# Patient Record
Sex: MALE | Race: WHITE | NOT HISPANIC OR LATINO | Employment: OTHER | URBAN - NONMETROPOLITAN AREA
[De-identification: names, ages, dates, MRNs, and addresses within clinical notes are randomized per-mention and may not be internally consistent; named-entity substitution may affect disease eponyms.]

---

## 2017-01-06 ENCOUNTER — OFFICE VISIT (OUTPATIENT)
Dept: FAMILY MEDICINE | Facility: OTHER | Age: 72
End: 2017-01-06
Attending: FAMILY MEDICINE
Payer: COMMERCIAL

## 2017-01-06 VITALS
OXYGEN SATURATION: 95 % | HEART RATE: 56 BPM | BODY MASS INDEX: 31.35 KG/M2 | SYSTOLIC BLOOD PRESSURE: 118 MMHG | DIASTOLIC BLOOD PRESSURE: 80 MMHG | HEIGHT: 70 IN | WEIGHT: 219 LBS | TEMPERATURE: 96.5 F

## 2017-01-06 DIAGNOSIS — N52.9 ERECTILE DYSFUNCTION, UNSPECIFIED ERECTILE DYSFUNCTION TYPE: ICD-10-CM

## 2017-01-06 DIAGNOSIS — M54.2 CERVICAL PAIN: ICD-10-CM

## 2017-01-06 DIAGNOSIS — Z71.89 ACP (ADVANCE CARE PLANNING): Primary | Chronic | ICD-10-CM

## 2017-01-06 PROCEDURE — 99214 OFFICE O/P EST MOD 30 MIN: CPT | Performed by: FAMILY MEDICINE

## 2017-01-06 PROCEDURE — 99212 OFFICE O/P EST SF 10 MIN: CPT

## 2017-01-06 RX ORDER — SILDENAFIL 25 MG/1
25 TABLET, FILM COATED ORAL DAILY PRN
Qty: 9 TABLET | Refills: 11 | Status: SHIPPED
Start: 2017-01-06 | End: 2017-06-05

## 2017-01-06 ASSESSMENT — ANXIETY QUESTIONNAIRES
3. WORRYING TOO MUCH ABOUT DIFFERENT THINGS: NOT AT ALL
6. BECOMING EASILY ANNOYED OR IRRITABLE: NOT AT ALL
5. BEING SO RESTLESS THAT IT IS HARD TO SIT STILL: NOT AT ALL
2. NOT BEING ABLE TO STOP OR CONTROL WORRYING: NOT AT ALL
7. FEELING AFRAID AS IF SOMETHING AWFUL MIGHT HAPPEN: NOT AT ALL
GAD7 TOTAL SCORE: 0
1. FEELING NERVOUS, ANXIOUS, OR ON EDGE: NOT AT ALL

## 2017-01-06 ASSESSMENT — PAIN SCALES - GENERAL: PAINLEVEL: MILD PAIN (2)

## 2017-01-06 ASSESSMENT — PATIENT HEALTH QUESTIONNAIRE - PHQ9: 5. POOR APPETITE OR OVEREATING: NOT AT ALL

## 2017-01-06 NOTE — MR AVS SNAPSHOT
After Visit Summary   1/6/2017    Dann Espinoza    MRN: [id number]           Patient Information     Date Of Birth          1945        Visit Information        Provider Department      1/6/2017 9:15 AM WASHINGTON Dey MD [facility]        Today's Diagnoses     ACP (advance care planning)    -  1     Cervical pain         Erectile dysfunction, unspecified erectile dysfunction type           Care Instructions    We will call with the MRI.        Follow-ups after your visit        Your next 10 appointments already scheduled     Jan 06, 2017  9:15 AM   (Arrive by 9:00 AM)   Office Visit with WASHINGTON Dey MD   [facility] ([facility])    [address]  [facility] [address]   [phone]           Bring a current list of meds and any records pertaining to this visit.  For Physicals, please bring immunization records and any forms needing to be filled out.    Please arrive 15 minutes early to complete paperwork and register.              Who to contact     If you have questions or need follow up information about today's clinic visit or your schedule please contact [facility] directly at [phone].  Normal or non-critical lab and imaging results will be communicated to you by 3D Formshart, letter or phone within 4 business days after the clinic has received the results. If you do not hear from us within 7 days, please contact the clinic through 3D Formshart or phone. If you have a critical or abnormal lab result, we will notify you by phone as soon as possible.  Submit refill requests through E[employer] or call your pharmacy and they will forward the refill request to us. Please allow 3 business days for your refill to be completed.          Additional Information About Your Visit        MyChart Information     [employer] lets you send messages to your doctor, view your test results, renew your prescriptions, schedule appointments and more. To sign up, go to  [email or web address] . Click on \"Log in\" on the left side of the screen, which will take you to the Welcome page. Then click on \"Sign up Now\" on the right side of the page.     You will be asked to enter the access code listed below, as well as some personal information. Please follow the directions to create your username and password.     Your access code is: [email or web address]  Expires: 2017  9:01 AM     Your access code will  in 90 days. If you need help or a new code, please call your [place] clinic or [phone].        Care EveryWhere ID     This is your Care EveryWhere ID. This could be used by other organizations to access your [facility] medical records  [id number]        Your Vitals Were     Pulse Temperature Height BMI (Body Mass Index) Pulse Oximetry       56 96.5  F (35.8  C) (Tympanic) 5' 10\" (1.778 m) 31.42 kg/m2 95%        Blood Pressure from Last 3 Encounters:   17 118/80   10/06/16 130/78   16 110/59    Weight from Last 3 Encounters:   17 219 lb (99.338 kg)   10/06/16 208 lb (94.348 kg)   16 208 lb (94.348 kg)              We Performed the Following     MR Cervical Spine w/o Contrast          Today's Medication Changes          These changes are accurate as of: 17  9:01 AM.  If you have any questions, ask your nurse or doctor.               Start taking these medicines.        Dose/Directions    sildenafil 25 MG cap/tab   Commonly known as:  REVATIO/VIAGRA   Used for:  Erectile dysfunction, unspecified erectile dysfunction type   Started by:  WASHINGTON Dey MD        Dose:  25 mg   Take 1 tablet (25 mg) by mouth daily as needed for erectile dysfunction Take 30 min to 4 hours before intercourse.  Never use with nitroglycerin, terazosin or doxazosin.   Quantity:  9 tablet   Refills:  11            Where to get your medicines      These medications were sent to [facility] [place], [place] - [address]  [address] [place] " [address]     Phone:  [phone]    - sildenafil 25 MG cap/tab             Primary Care Provider Office Phone # Fax #    R Navneet Dey [phone]       [facility] [place] [address] [place] [address]        Thank you!     Thank you for choosing [facility]] [facility]  for your care. Our goal is always to provide you with excellent care. Hearing back from our patients is one way we can continue to improve our services. Please take a few minutes to complete the written survey that you may receive in the mail after your visit with us. Thank you!             Your Updated Medication List - Protect others around you: Learn how to safely use, store and throw away your medicines at www.disposemymeds.org.          This list is accurate as of: 1/6/17  9:01 AM.  Always use your most recent med list.                   Brand Name Dispense Instructions for use    allopurinol 300 MG tablet    ZYLOPRIM     Take 0.5 tablets by mouth daily.       aspirin 81 MG EC tablet   Generic drug:  aspirin      Take 1 tablet by mouth daily.       ATORVASTATIN CALCIUM PO      Take 40 mg by mouth daily       HYDROcodone-acetaminophen 5-325 MG per tablet    NORCO    60 tablet    TAKE ONE TABLET BY MOUTH EVRY 6 HOURS AS NEEDED FOR MODERATE TO SEVERE PAIN       sildenafil 25 MG cap/tab    REVATIO/VIAGRA    9 tablet    Take 1 tablet (25 mg) by mouth daily as needed for erectile dysfunction Take 30 min to 4 hours before intercourse.  Never use with nitroglycerin, terazosin or doxazosin.       Vitamin D3 2000 UNITS Tabs      Take 1 tablet by mouth daily.

## 2017-01-06 NOTE — PROGRESS NOTES
/\  [facility]    Dann Espinoza, 71 year old, male presents with   Chief Complaint   Patient presents with     Headache     duration - 2 weeks, headache- back of the head. also having neck pain. sharp pain.If he turns his head to the left has pain anastasia the left posterior scalp.  If he turns his head to the right has no pain seems to be worse if he sleeps on his left side.  Denies any unexplained vomiting or acute vision change or weakness or numbness Therapies Tried- Aspirin, hydrocodone.      Health Maintenance     declined      Consult     Also is requesting generic Viagra.   Does not take nitroglycerin.  Denies any chest pain when he goes up a couple flights of stairs.       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 Authorizing Provider   sildenafil (REVATIO/VIAGRA) 25 MG cap/tab Take 1 tablet (25 mg) by mouth daily as needed for erectile dysfunction Take 30 min to 4 hours before intercourse.  Never use with nitroglycerin, terazosin or doxazosin. 1/6/17  Yes WASHINGTON Dey MD   HYDROcodone-acetaminophen (NORCO) 5-325 MG per tablet TAKE ONE TABLET BY MOUTH EVRY 6 HOURS AS NEEDED FOR MODERATE TO SEVERE PAIN 12/30/16  Yes Massimo Berry MD   ATORVASTATIN CALCIUM PO Take 40 mg by mouth daily   Yes Reported, Patient   allopurinol (ZYLOPRIM) 300 MG tablet Take 0.5 tablets by mouth daily.   Yes Reported, Patient   aspirin (ASPIR-81) 81 MG EC tablet Take 1 tablet by mouth  "daily.   Yes Reported, Patient   Cholecalciferol (VITAMIN D3) 2000 UNITS TABS Take 1 tablet by mouth daily.   Yes Reported, Patient       ALLERGIES:   No Known Allergies    ROS:  Constitutional, neuro, ENT, endocrine, pulmonary, cardiac, gastrointestinal, genitourinary, musculoskeletal, integument and psychiatric systems are negative, except as otherwise noted.      EXAM:  /80 mmHg  Pulse 56  Temp(Src) 96.5  F (35.8  C) (Tympanic)  Ht 5' 10\" (1.778 m)  Wt 219 lb (99.338 kg)  BMI 31.42 kg/m2  SpO2 95% Body mass index is 31.42 kg/(m^2).   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  He has tenderness left paraspinal cervical area.  RESP: lungs clear to auscultation - no rales, rhonchi or wheezes  CV: regular rates and rhythm, normal S1 S2, no S3 or S4 and no murmur, click or rub  NEURO: Normal strength and tone, mentation intact and speech normal  PSYCH: mentation appears normal and affect normal/bright  Lab/ X-ray  No results found for this or any previous visit (from the past 24 hour(s)).    ASSESSMENT/PLAN:    ICD-10-CM    1. ACP (advance care planning) Z71.89    2. Cervical pain M54.2 MR Cervical Spine w/o Contrast ordered will call him with results.  I suspect he has cervical impingement with radicular pain to his posterior scalp.  If he develops unexplained vomiting or his worst headache of his life rather acute change will go to the emergency room.  Currently now has no headache.   3. Erectile dysfunction, unspecified erectile dysfunction type N52.9 sildenafil (REVATIO/VIAGRA) 25 MG cap/tab  generic tablets prescribed.          CHEN Dey MD  January 6, 2017            "

## 2017-01-06 NOTE — NURSING NOTE
"Chief Complaint   Patient presents with     Headache     duration - 2 weeks, headache- back of the head. also having neck pain. sharp pain. Therapies Tried- Aspirin, hydrocodone.      Health Maintenance     declined        Initial /80 mmHg  Pulse 56  Temp(Src) 96.5  F (35.8  C) (Tympanic)  Ht 5' 10\" (1.778 m)  Wt 219 lb (99.338 kg)  BMI 31.42 kg/m2  SpO2 95% Estimated body mass index is 31.42 kg/(m^2) as calculated from the following:    Height as of this encounter: 5' 10\" (1.778 m).    Weight as of this encounter: 219 lb (99.338 kg).  BP completed using cuff size: drew HAYWARD      "

## 2017-01-07 ASSESSMENT — ANXIETY QUESTIONNAIRES: GAD7 TOTAL SCORE: 0

## 2017-01-10 ENCOUNTER — HOSPITAL ENCOUNTER (OUTPATIENT)
Dept: MRI IMAGING | Facility: HOSPITAL | Age: 72
Discharge: HOME | End: 2017-01-10
Attending: FAMILY MEDICINE | Admitting: FAMILY MEDICINE
Payer: COMMERCIAL

## 2017-01-10 ENCOUNTER — TELEPHONE (OUTPATIENT)
Dept: FAMILY MEDICINE | Facility: OTHER | Age: 72
End: 2017-01-10

## 2017-01-10 DIAGNOSIS — M54.2 CERVICAL PAIN: Primary | ICD-10-CM

## 2017-01-10 PROCEDURE — 72141 MRI NECK SPINE W/O DYE: CPT | Mod: TC

## 2017-01-17 ENCOUNTER — HOSPITAL ENCOUNTER (OUTPATIENT)
Dept: GENERAL RADIOLOGY | Facility: HOSPITAL | Age: 72
Discharge: HOME | End: 2017-01-17
Attending: FAMILY MEDICINE | Admitting: FAMILY MEDICINE
Payer: COMMERCIAL

## 2017-01-17 DIAGNOSIS — M54.2 CERVICALGIA: Primary | ICD-10-CM

## 2017-01-17 PROCEDURE — 99212 OFFICE O/P EST SF 10 MIN: CPT | Mod: TC

## 2017-01-17 NOTE — PROGRESS NOTES
"Pain Management Referral Consult    PATIENT HISTORICAL:    Patient Anatomy/region of concern: cervical spine    When and how did your pain begin?   Date December 2016 Event no injury    Location of the pain? Lt side neck pain    Describe what pain feels like: dull pain gets sharp when turning head to the left  Does it interfere with daily activities? no    What makes your pain better? Aspirin helps      Focal tenderness at one point along the length of a taut band (\"knotted muscle\")?  Yes     Restricted range of motion of the involved muscle or joint?  Yes    Did you have a previous injection series where you found relief of at least 3 months?  No  Have you had surgery in the area of pain?   No  If yes, when was the surgery?    What treatments have you already had for your pain?   Add length of time in weeks for all that apply.    Treatment Tried it--helped Tried it-- nohelp Made it worse   Pain clinic      Physical therapy      Chiropractic care      Spine injections       Other nerve blocks      Surgery      Exercise      Others (list):              Injection Documentation of Provider History    PER-PROVIDER HISTORY, Dr. denver chase  Is this for treatment of acute herpes zoster, post herpetic neuralgia, post-decompressive radiculitis, or post-surgical scarring?   No  Does the patient have radiculopathy or sciatica?  Yes  How long has the patient had any physical therapy, home therapy or chiropractor care?  0 weeks.  How long has the patient taken NSaids or muscle relaxants?  >4 weeks.  Is there any history of systemic/local infection or unstable medical conditions?  No    "

## 2017-01-18 DIAGNOSIS — N52.9 ERECTILE DYSFUNCTION, UNSPECIFIED ERECTILE DYSFUNCTION TYPE: Primary | ICD-10-CM

## 2017-01-19 RX ORDER — SILDENAFIL CITRATE 20 MG/1
TABLET ORAL
Qty: 9 TABLET | Refills: 11 | Status: SHIPPED | OUTPATIENT
Start: 2017-01-19 | End: 2017-06-05 | Stop reason: DRUGHIGH

## 2017-01-23 ENCOUNTER — HOSPITAL ENCOUNTER (OUTPATIENT)
Dept: INTERVENTIONAL RADIOLOGY/VASCULAR | Facility: HOSPITAL | Age: 72
Discharge: HOME | End: 2017-01-23
Attending: FAMILY MEDICINE | Admitting: FAMILY MEDICINE
Payer: COMMERCIAL

## 2017-01-23 PROCEDURE — 25000125 ZZHC RX 250

## 2017-01-23 PROCEDURE — 62321 NJX INTERLAMINAR CRV/THRC: CPT | Mod: TC

## 2017-01-23 PROCEDURE — 25000125 ZZHC RX 250: Performed by: RADIOLOGY

## 2017-01-23 RX ORDER — METHYLPREDNISOLONE ACETATE 80 MG/ML
80 INJECTION, SUSPENSION INTRA-ARTICULAR; INTRALESIONAL; INTRAMUSCULAR; SOFT TISSUE ONCE
Status: COMPLETED | OUTPATIENT
Start: 2017-01-23 | End: 2017-01-23

## 2017-01-23 RX ORDER — IOPAMIDOL 612 MG/ML
15 INJECTION, SOLUTION INTRATHECAL ONCE
Status: COMPLETED | OUTPATIENT
Start: 2017-01-23 | End: 2017-01-23

## 2017-01-23 RX ORDER — LIDOCAINE HYDROCHLORIDE 10 MG/ML
INJECTION, SOLUTION EPIDURAL; INFILTRATION; INTRACAUDAL; PERINEURAL
Status: COMPLETED
Start: 2017-01-23 | End: 2017-01-23

## 2017-01-23 RX ORDER — METHYLPREDNISOLONE ACETATE 80 MG/ML
INJECTION, SUSPENSION INTRA-ARTICULAR; INTRALESIONAL; INTRAMUSCULAR; SOFT TISSUE
Status: DISCONTINUED
Start: 2017-01-23 | End: 2017-01-24 | Stop reason: HOSPADM

## 2017-01-23 RX ADMIN — METHYLPREDNISOLONE ACETATE 80 MG: 80 INJECTION, SUSPENSION INTRA-ARTICULAR; INTRALESIONAL; INTRAMUSCULAR; SOFT TISSUE at 15:02

## 2017-01-23 RX ADMIN — LIDOCAINE HYDROCHLORIDE 3 ML: 10 INJECTION, SOLUTION EPIDURAL; INFILTRATION; INTRACAUDAL; PERINEURAL at 15:02

## 2017-01-23 RX ADMIN — IOPAMIDOL 5 ML: 612 INJECTION, SOLUTION INTRATHECAL at 15:02

## 2017-01-23 NOTE — IP AVS SNAPSHOT
MRN:[id number]                      After Visit Summary   1/23/2017    Dann Espinoza    MRN: [id number]           Visit Information        Provider Department      1/23/2017  2:30 PM Radiologist, Need Interventional; [facility] INTERVENTIONAL [facility] Inter[facility]iology           Review of your medicines      UNREVIEWED medicines. Ask your doctor about these medicines        Dose / Directions    allopurinol 300 MG tablet   Commonly known as:  ZYLOPRIM        Dose:  0.5 tablet   Take 0.5 tablets by mouth daily.   Refills:  0       aspirin 81 MG EC tablet   Generic drug:  aspirin        Dose:  1 tablet   Take 1 tablet by mouth daily.   Refills:  0       ATORVASTATIN CALCIUM PO        Dose:  40 mg   Take 40 mg by mouth daily   Refills:  0       HYDROcodone-acetaminophen 5-325 MG per tablet   Commonly known as:  NORCO   Used for:  Left hip pain        TAKE ONE TABLET BY MOUTH EVRY 6 HOURS AS NEEDED FOR MODERATE TO SEVERE PAIN   Quantity:  60 tablet   Refills:  0       sildenafil 20 MG tablet   Commonly known as:  REVATIO/VIAGRA   Used for:  Erectile dysfunction, unspecified erectile dysfunction type        Take 1 tablet (20 mg) by mouth as needed for erectile dysfunction.  Never use with nitroglycerin, terazosin or doxazosin.   Quantity:  9 tablet   Refills:  11       sildenafil 25 MG cap/tab   Commonly known as:  REVATIO/VIAGRA   Used for:  Erectile dysfunction, unspecified erectile dysfunction type        Dose:  25 mg   Take 1 tablet (25 mg) by mouth daily as needed for erectile dysfunction Take 30 min to 4 hours before intercourse.  Never use with nitroglycerin, terazosin or doxazosin.   Quantity:  9 tablet   Refills:  11       Vitamin D3 2000 UNITS Tabs        Dose:  1 tablet   Take 1 tablet by mouth daily.   Refills:  0                Protect others around you: Learn how to safely use, store and throw away your medicines at www.disposemymeds.org.         Follow-ups after your visit         Care  "Instructions        Further instructions from your care team       Home number on file [phone] (home)  Is it ok to leave a message if you are not home yes    Dr Huerta completed your rafita cervical procedure on 1/23/2017.    Current Pain Level (0-10 Scale): 3/10  Post Pain Level (0-10):  2/10    Patient will be contacted by a diagnostic imaging nurse via telephone for follow up on pain levels in 2 weeks.    Radiology Discharge instructions for Steroid Injection    Activity Level:     Do not do any heavy activity or exercise for 24 hours.   Do not drive for 4 hours after your injection.  Diet:   Return to your normal diet.  Medications:   If you have stopped taking your Aspirin, Coumadin/Warfarin, Ibuprofen, or any   other blood thinner for this procedure you may resume in the morning unless   your primary care provider has given you other instructions.    Diabetics may see an increase in blood sugar after steroid injections. If you are concerned about your blood sugar, please contact your family doctor.    Site Care:  Remove the bandage and bathe or shower the morning after the procedure.      Call your Primary Care Provider if you have the following (if your primary care provider is not available please seek emergency care):   Nausea with vomiting   Severe headache   Drowsiness or confusion   Redness or drainage at the injection or puncture site   Temperature over 101 degrees F   Other concerns   Worsening back pain   Stiff neck    If you have any questions or concerns, please call [phone].        Additional Information About Your Visit        [employer] Information     [employer] lets you send messages to your doctor, view your test results, renew your prescriptions, schedule appointments and more. To sign up, go to [email or web address] . Click on \"Log in\" on the left side of the screen, which will take you to the Welcome page. Then click on \"Sign up Now\" on the right side of the page.     You will be " asked to enter the access code listed below, as well as some personal information. Please follow the directions to create your username and password.     Your access code is: [email or web address]  Expires: 2017  9:01 AM     Your access code will  in 90 days. If you need help or a new code, please call your [facility] clinic or [phone].        Care EveryWhere ID     This is your Care EveryWhere ID. This could be used by other organizations to access your [facility] medical records  [id number]         Primary Care Provider Office Phone # Fax #    R Navneet Dey [phone]      Thank you!     Thank you for choosing [facility] for your care. Our goal is always to provide you with excellent care. Hearing back from our patients is one way we can continue to improve our services. Please take a few minutes to complete the written survey that you may receive in the mail after you visit with us. Thank you!             Medication List: This is a list of all your medications and when to take them. Check marks below indicate your daily home schedule. Keep this list as a reference.      Medications           Morning Afternoon Evening Bedtime As Needed    allopurinol 300 MG tablet   Commonly known as:  ZYLOPRIM   Take 0.5 tablets by mouth daily.                                aspirin 81 MG EC tablet   Take 1 tablet by mouth daily.   Generic drug:  aspirin                                ATORVASTATIN CALCIUM PO   Take 40 mg by mouth daily                                HYDROcodone-acetaminophen 5-325 MG per tablet   Commonly known as:  NORCO   TAKE ONE TABLET BY MOUTH EVRY 6 HOURS AS NEEDED FOR MODERATE TO SEVERE PAIN                                sildenafil 20 MG tablet   Commonly known as:  REVATIO/VIAGRA   Take 1 tablet (20 mg) by mouth as needed for erectile dysfunction.  Never use with nitroglycerin, terazosin or doxazosin.                                sildenafil 25 MG cap/tab   Commonly known as:   REVATIO/VIAGRA   Take 1 tablet (25 mg) by mouth daily as needed for erectile dysfunction Take 30 min to 4 hours before intercourse.  Never use with nitroglycerin, terazosin or doxazosin.                                Vitamin D3 2000 UNITS Tabs   Take 1 tablet by mouth daily.

## 2017-01-23 NOTE — PROGRESS NOTES
Fluoro time for this case was 29 seconds, less than 5 min  Was patient held? NO  If yes, by whom?na

## 2017-01-23 NOTE — DISCHARGE INSTRUCTIONS
Home number on file [phone] (home)  Is it ok to leave a message if you are not home yes    Dr Huerta completed your rafita cervical procedure on 1/23/2017.    Current Pain Level (0-10 Scale): 3/10  Post Pain Level (0-10):  2/10    Patient will be contacted by a diagnostic imaging nurse via telephone for follow up on pain levels in 2 weeks.    Radiology Discharge instructions for Steroid Injection    Activity Level:     Do not do any heavy activity or exercise for 24 hours.   Do not drive for 4 hours after your injection.  Diet:   Return to your normal diet.  Medications:   If you have stopped taking your Aspirin, Coumadin/Warfarin, Ibuprofen, or any   other blood thinner for this procedure you may resume in the morning unless   your primary care provider has given you other instructions.    Diabetics may see an increase in blood sugar after steroid injections. If you are concerned about your blood sugar, please contact your family doctor.    Site Care:  Remove the bandage and bathe or shower the morning after the procedure.      Call your Primary Care Provider if you have the following (if your primary care provider is not available please seek emergency care):   Nausea with vomiting   Severe headache   Drowsiness or confusion   Redness or drainage at the injection or puncture site   Temperature over 101 degrees F   Other concerns   Worsening back pain   Stiff neck    If you have any questions or concerns, please call [phone].

## 2017-01-23 NOTE — IP AVS SNAPSHOT
[facility]    [address] [place] [address]    Phone:  [phone]    Fax:  [phone]                                       After Visit Summary   1/23/2017    Dann Espinoza    MRN: [id number]           After Visit Summary Signature Page     I have received my discharge instructions, and my questions have been answered. I have discussed any challenges I see with this plan with the nurse or doctor.    ..........................................................................................................................................  Patient/Patient Representative Signature      ..........................................................................................................................................  Patient Representative Print Name and Relationship to Patient    ..................................................               ................................................  Date                                            Time    ..........................................................................................................................................  Reviewed by Signature/Title    ...................................................              ..............................................  Date                                                            Time

## 2017-02-17 ENCOUNTER — TRANSFERRED RECORDS (OUTPATIENT)
Dept: HEALTH INFORMATION MANAGEMENT | Facility: HOSPITAL | Age: 72
End: 2017-02-17

## 2017-02-17 DIAGNOSIS — M25.552 LEFT HIP PAIN: ICD-10-CM

## 2017-02-17 RX ORDER — HYDROCODONE BITARTRATE AND ACETAMINOPHEN 5; 325 MG/1; MG/1
TABLET ORAL
Qty: 60 TABLET | Refills: 0 | Status: SHIPPED | OUTPATIENT
Start: 2017-02-17 | End: 2017-03-07

## 2017-02-17 NOTE — TELEPHONE ENCOUNTER
norco      Last Written Prescription Date: 12/30/16  Last Fill Quantity: 60,  # refills: 0   Last Office Visit with G, P or [facility] prescribing provider: 1/6/17

## 2017-02-17 NOTE — TELEPHONE ENCOUNTER
Pt notified that the written RX is ready at the [facility]  registration to be picked up.

## 2017-03-07 DIAGNOSIS — M25.552 LEFT HIP PAIN: ICD-10-CM

## 2017-03-07 RX ORDER — HYDROCODONE BITARTRATE AND ACETAMINOPHEN 5; 325 MG/1; MG/1
TABLET ORAL
Qty: 60 TABLET | Refills: 0 | Status: SHIPPED | OUTPATIENT
Start: 2017-03-07 | End: 2017-04-03

## 2017-03-07 NOTE — TELEPHONE ENCOUNTER
norco      Last Written Prescription Date: 2/17/17  Last Fill Quantity: 60,  # refills: 0   Last Office Visit with G, P or [facility] prescribing provider: 1/6/17

## 2017-03-08 NOTE — TELEPHONE ENCOUNTER
Pt notified that the written RX is ready at the [facility]  registration to be picked up.

## 2017-04-03 DIAGNOSIS — M25.552 LEFT HIP PAIN: ICD-10-CM

## 2017-04-03 NOTE — TELEPHONE ENCOUNTER
norco      Last Written Prescription Date: 3/7/17  Last Fill Quantity: 60,  # refills: 0   Last Office Visit with G, P or [facility] prescribing provider: 1/6/17

## 2017-04-04 RX ORDER — HYDROCODONE BITARTRATE AND ACETAMINOPHEN 5; 325 MG/1; MG/1
TABLET ORAL
Qty: 60 TABLET | Refills: 0 | Status: SHIPPED | OUTPATIENT
Start: 2017-04-04 | End: 2017-05-03

## 2017-04-04 NOTE — TELEPHONE ENCOUNTER
Pt notified that the written RX is ready at the [facility]  registration to be picked up.

## 2017-05-03 DIAGNOSIS — M25.552 LEFT HIP PAIN: ICD-10-CM

## 2017-05-03 RX ORDER — HYDROCODONE BITARTRATE AND ACETAMINOPHEN 5; 325 MG/1; MG/1
TABLET ORAL
Qty: 60 TABLET | Refills: 0 | Status: SHIPPED | OUTPATIENT
Start: 2017-05-03 | End: 2017-05-31

## 2017-05-03 NOTE — TELEPHONE ENCOUNTER
Left message that the written RX is ready at the [facility]  registration to be picked up.

## 2017-05-03 NOTE — TELEPHONE ENCOUNTER
norco      Last Written Prescription Date: 4/4/16  Last Fill Quantity: 60,  # refills: 0   Last Office Visit with G, P or [facility] prescribing provider: 1/6/17

## 2017-05-17 ENCOUNTER — TRANSFERRED RECORDS (OUTPATIENT)
Dept: HEALTH INFORMATION MANAGEMENT | Facility: HOSPITAL | Age: 72
End: 2017-05-17

## 2017-05-31 DIAGNOSIS — M25.552 LEFT HIP PAIN: ICD-10-CM

## 2017-05-31 RX ORDER — HYDROCODONE BITARTRATE AND ACETAMINOPHEN 5; 325 MG/1; MG/1
TABLET ORAL
Qty: 60 TABLET | Refills: 0 | Status: SHIPPED | OUTPATIENT
Start: 2017-05-31 | End: 2017-07-06

## 2017-05-31 NOTE — TELEPHONE ENCOUNTER
Last office visit 01/06/17. Norco last filled 05/03/17 #60. PCP Dr. Navneet Dey. Medication pended.

## 2017-06-01 NOTE — TELEPHONE ENCOUNTER
Pt notified that the written RX is ready at the [facility]  registration to be picked up.

## 2017-06-02 ENCOUNTER — TRANSFERRED RECORDS (OUTPATIENT)
Dept: HEALTH INFORMATION MANAGEMENT | Facility: HOSPITAL | Age: 72
End: 2017-06-02

## 2017-06-02 LAB
CREAT SERPL-MCNC: 1.14 MG/DL (ref 0.7–1.2)
GLUCOSE SERPL-MCNC: 110 MG/DL (ref 70–100)
INR PPP: 0.9 (ref 0.9–1.1)
POTASSIUM SERPL-SCNC: 4.3 MEQ/L (ref 3.4–5.1)

## 2017-06-05 ENCOUNTER — OFFICE VISIT (OUTPATIENT)
Dept: FAMILY MEDICINE | Facility: OTHER | Age: 72
End: 2017-06-05
Attending: NURSE PRACTITIONER
Payer: COMMERCIAL

## 2017-06-05 VITALS
TEMPERATURE: 99 F | HEIGHT: 70 IN | RESPIRATION RATE: 20 BRPM | OXYGEN SATURATION: 93 % | BODY MASS INDEX: 31.5 KG/M2 | WEIGHT: 220 LBS | HEART RATE: 74 BPM | SYSTOLIC BLOOD PRESSURE: 110 MMHG | DIASTOLIC BLOOD PRESSURE: 62 MMHG

## 2017-06-05 DIAGNOSIS — R07.9 CHEST PAIN OF UNKNOWN ETIOLOGY: Primary | ICD-10-CM

## 2017-06-05 PROCEDURE — 99213 OFFICE O/P EST LOW 20 MIN: CPT | Performed by: NURSE PRACTITIONER

## 2017-06-05 PROCEDURE — 99212 OFFICE O/P EST SF 10 MIN: CPT

## 2017-06-05 ASSESSMENT — PAIN SCALES - GENERAL: PAINLEVEL: NO PAIN (0)

## 2017-06-05 NOTE — PROGRESS NOTES
SUBJECTIVE:                                                    Dann Espinoza is a 71 year old male who presents to clinic today for the following health issues:      ED/UC Followup:    Facility:  [facility]  Date of visit: 6/1/17  Reason for visit: Chest pain  Current Status: Good - no further episodes of chest pain  Dann was able to work outside without any new pain or shortness of breath denied any further episodes of chest pain           Problem list and histories reviewed & adjusted, as indicated.  Additional history: as documented    Patient Active Problem List   Diagnosis     ACP (advance care planning)     Lumbago     Asbestosis (H)     Gout     Hyperlipidemia     Decreased hearing     Dizziness     ASNHL (asymmetrical sensorineural hearing loss)     Impacted cerumen     Aortic root dilation (H)     Duane's syndrome of left eye     Constipation     Coxitis     Elevated prostate specific antigen (PSA)     Vitreous floaters     Vitreous hemorrhage (H)     Vitreous degeneration     Osteoarthritis of hip     Past Surgical History:   Procedure Laterality Date     COLONOSCOPY  07-    Rectal Bleeding > Repeat 3 years (2011)     excision of skin cancer       HEMORRHOIDECTOMY         Social History   Substance Use Topics     Smoking status: Former Smoker     Smokeless tobacco: Never Used     Alcohol use No     Family History   Problem Relation Age of Onset     Crohn Disease Father      CANCER Father 86     Liver - Cause of Death     HEART DISEASE Father      HEART DISEASE Brother      DIABETES Brother 49     Cause of Death     CEREBROVASCULAR DISEASE Mother      CVA     Other - See Comments Mother      AAA     HEART DISEASE Mother      HEART DISEASE Sister          Current Outpatient Prescriptions   Medication Sig Dispense Refill     HYDROcodone-acetaminophen (NORCO) 5-325 MG per tablet TAKE ONE TABLET BY MOUTH EVRY 6 HOURS AS NEEDED FOR MODERATE TO SEVERE PAIN 60 tablet 0     ATORVASTATIN  "CALCIUM PO Take 40 mg by mouth daily       allopurinol (ZYLOPRIM) 300 MG tablet Take 0.5 tablets by mouth daily.       aspirin (ASPIR-81) 81 MG EC tablet Take 1 tablet by mouth daily.       Cholecalciferol (VITAMIN D3) 2000 UNITS TABS Take 1 tablet by mouth daily.       No Known Allergies    Reviewed and updated as needed this visit by clinical staff  Allergies       Reviewed and updated as needed this visit by Provider         ROS:  C: NEGATIVE for fever, chills, change in weight  ENT/MOUTH: nasal congestion - resent cold symptoms clearing   R: NEGATIVE for significant cough or SOB  CV: NEGATIVE for chest pain, palpitations or peripheral edema    OBJECTIVE:                                                    /62 (BP Location: Right arm, Patient Position: Chair, Cuff Size: Adult Large)  Pulse 74  Temp 99  F (37.2  C) (Tympanic)  Resp 20  Ht 5' 10\" (1.778 m)  Wt 220 lb (99.8 kg)  SpO2 93%  BMI 31.57 kg/m2  Body mass index is 31.57 kg/(m^2).   GENERAL: healthy, alert and no distress  NECK: no adenopathy, no asymmetry, masses, or scars and thyroid normal to palpation  RESP: lungs clear to auscultation - no rales, rhonchi or wheezes  CV: regular rate and rhythm, normal S1 S2, no S3 or S4, no murmur, click or rub, no peripheral edema and peripheral pulses strong  ABDOMEN: soft, nontender, no hepatosplenomegaly, no masses and bowel sounds normal  PSYCH: mentation appears normal, affect normal/bright  LYMPH: normal ant/post cervical, supraclavicular nodes    Diagnostic Test Results:  none      ASSESSMENT:                                                        PLAN:                                                    ASSESSMENT / PLAN:  (R07.89) Chest pain of unknown etiology  (primary encounter diagnosis)  Comment: cleared  Plan:  Follow up with any further episodes of chest pain, increased shortness of breath or any symptoms of concern.      Follow up as needed if symptoms return or any concerns          Karrie " JOSHUA [facility]

## 2017-06-05 NOTE — PATIENT INSTRUCTIONS
*CHEST PAIN, UNCERTAIN CAUSE    Based on your exam today, the exact cause of your chest pain is not certain. Your condition does not seem serious at this time, and your pain does not appear to be coming from your heart. However, sometimes the signs of a serious problem take more time to appear. Therefore, watch for the warning signs listed below.  HOME CARE:  1. Rest today and avoid strenuous activity.  2. Take any prescribed medicine as directed.  FOLLOW UP with your doctor in 1-3 days.   GET PROMPT MEDICAL ATTENTION if any of the following occur:    A change in the type of pain: if it feels different, becomes more severe, lasts longer, or begins to spread into your shoulder, arm, neck, jaw or back    Shortness of breath or increased pain with breathing    Weakness, dizziness, or fainting    Cough with blood or dark colored sputum (phlegm)    Fever over 101  F (38.3  C)    Swelling, pain or redness in one leg    1173-7780 [address], [place] [address]. All rights reserved. This information is not intended as a substitute for professional medical care. Always follow your healthcare professional's instructions.

## 2017-06-05 NOTE — MR AVS SNAPSHOT
After Visit Summary   6/5/2017    Dann Espinoza    MRN: [id number]           Patient Information     Date Of Birth          1945        Visit Information        Provider Department      6/5/2017 10:40 AM Karrie Luciano APRN CNP [facility]        Today's Diagnoses     Chest pain of unknown etiology    -  1      Care Instructions       *CHEST PAIN, UNCERTAIN CAUSE    Based on your exam today, the exact cause of your chest pain is not certain. Your condition does not seem serious at this time, and your pain does not appear to be coming from your heart. However, sometimes the signs of a serious problem take more time to appear. Therefore, watch for the warning signs listed below.  HOME CARE:  1. Rest today and avoid strenuous activity.  2. Take any prescribed medicine as directed.  FOLLOW UP with your doctor in 1-3 days.   GET PROMPT MEDICAL ATTENTION if any of the following occur:    A change in the type of pain: if it feels different, becomes more severe, lasts longer, or begins to spread into your shoulder, arm, neck, jaw or back    Shortness of breath or increased pain with breathing    Weakness, dizziness, or fainting    Cough with blood or dark colored sputum (phlegm)    Fever over 101  F (38.3  C)    Swelling, pain or redness in one leg    4143-[address]442 [place] [address]. All rights reserved. This information is not intended as a substitute for professional medical care. Always follow your healthcare professional's instructions.            Follow-ups after your visit        Follow-up notes from your care team     Return if symptoms worsen or fail to improve.      Who to contact     If you have questions or need follow up information about today's clinic visit or your schedule please contact [facility] directly at [phone].  Normal or non-critical lab and imaging results will be communicated to you by Silvia  "letter or phone within 4 business days after the clinic has received the results. If you do not hear from us within 7 days, please contact the clinic through [facility] or phone. If you have a critical or abnormal lab result, we will notify you by phone as soon as possible.  Submit refill requests through [facility] or call your pharmacy and they will forward the refill request to us. Please allow 3 business days for your refill to be completed.          Additional Information About Your Visit        [facility] Information     [facility] lets you send messages to your doctor, view your test results, renew your prescriptions, schedule appointments and more. To sign up, go to [email or web address] . Click on \"Log in\" on the left side of the screen, which will take you to the Welcome page. Then click on \"Sign up Now\" on the right side of the page.     You will be asked to enter the access code listed below, as well as some personal information. Please follow the directions to create your username and password.     Your access code is: [email or web address]  Expires: 9/3/2017 11:22 AM     Your access code will  in 90 days. If you need help or a new code, please call your [facility] clinic or [phone].        Care EveryWhere ID     This is your Care EveryWhere ID. This could be used by other organizations to access your [facility] medical records  [id number]        Your Vitals Were     Pulse Temperature Respirations Height Pulse Oximetry BMI (Body Mass Index)    74 99  F (37.2  C) (Tympanic) 20 5' 10\" (1.778 m) 93% 31.57 kg/m2       Blood Pressure from Last 3 Encounters:   17 110/62   17 118/80   10/06/16 130/78    Weight from Last 3 Encounters:   17 220 lb (99.8 kg)   17 219 lb (99.3 kg)   10/06/16 208 lb (94.3 kg)              Today, you had the following     No orders found for display         Today's Medication Changes          These changes are accurate as of: 17 11:22 AM.  If you have any " questions, ask your nurse or doctor.               Stop taking these medicines if you haven't already. Please contact your care team if you have questions.     sildenafil 20 MG tablet   Commonly known as:  REVATIO/VIAGRA   Stopped by:  Karrie Luciano APRN CNP                    Primary Care Provider Office Phone # Fax #    R Navneet Dey [phone]       [facility] [address] [place] [id number]        Thank you!     Thank you for choosing [facility]  for your care. Our goal is always to provide you with excellent care. Hearing back from our patients is one way we can continue to improve our services. Please take a few minutes to complete the written survey that you may receive in the mail after your visit with us. Thank you!             Your Updated Medication List - Protect others around you: Learn how to safely use, store and throw away your medicines at www.disposemymeds.org.          This list is accurate as of: 6/5/17 11:22 AM.  Always use your most recent med list.                   Brand Name Dispense Instructions for use    allopurinol 300 MG tablet    ZYLOPRIM     Take 0.5 tablets by mouth daily.       aspirin 81 MG EC tablet   Generic drug:  aspirin      Take 1 tablet by mouth daily.       ATORVASTATIN CALCIUM PO      Take 40 mg by mouth daily       HYDROcodone-acetaminophen 5-325 MG per tablet    NORCO    60 tablet    TAKE ONE TABLET BY MOUTH EVRY 6 HOURS AS NEEDED FOR MODERATE TO SEVERE PAIN       Vitamin D3 2000 UNITS Tabs      Take 1 tablet by mouth daily.

## 2017-06-07 ENCOUNTER — HOSPITAL ENCOUNTER (EMERGENCY)
Facility: HOSPITAL | Age: 72
Discharge: HOME | End: 2017-06-07
Payer: COMMERCIAL

## 2017-06-07 VITALS
DIASTOLIC BLOOD PRESSURE: 85 MMHG | OXYGEN SATURATION: 95 % | RESPIRATION RATE: 18 BRPM | SYSTOLIC BLOOD PRESSURE: 153 MMHG | TEMPERATURE: 102.3 F

## 2017-06-07 DIAGNOSIS — J18.9 PNEUMONIA OF RIGHT LOWER LOBE DUE TO INFECTIOUS ORGANISM: ICD-10-CM

## 2017-06-07 LAB
ALBUMIN SERPL-MCNC: 3.3 G/DL (ref 3.4–5)
ALP SERPL-CCNC: 110 U/L (ref 40–150)
ALT SERPL W P-5'-P-CCNC: 36 U/L (ref 0–70)
ANION GAP SERPL CALCULATED.3IONS-SCNC: 6 MMOL/L (ref 3–14)
AST SERPL W P-5'-P-CCNC: 19 U/L (ref 0–45)
BASOPHILS # BLD AUTO: 0 10E9/L (ref 0–0.2)
BASOPHILS NFR BLD AUTO: 0.2 %
BILIRUB SERPL-MCNC: 0.6 MG/DL (ref 0.2–1.3)
BUN SERPL-MCNC: 17 MG/DL (ref 7–30)
CALCIUM SERPL-MCNC: 9.6 MG/DL (ref 8.5–10.1)
CHLORIDE SERPL-SCNC: 103 MMOL/L (ref 94–109)
CO2 SERPL-SCNC: 29 MMOL/L (ref 20–32)
CREAT SERPL-MCNC: 1.09 MG/DL (ref 0.66–1.25)
DIFFERENTIAL METHOD BLD: ABNORMAL
EOSINOPHIL # BLD AUTO: 0.1 10E9/L (ref 0–0.7)
EOSINOPHIL NFR BLD AUTO: 1.1 %
ERYTHROCYTE [DISTWIDTH] IN BLOOD BY AUTOMATED COUNT: 13.8 % (ref 10–15)
GFR SERPL CREATININE-BSD FRML MDRD: 67 ML/MIN/1.7M2
GLUCOSE SERPL-MCNC: 116 MG/DL (ref 70–99)
HCT VFR BLD AUTO: 38.5 % (ref 40–53)
HGB BLD-MCNC: 13.6 G/DL (ref 13.3–17.7)
IMM GRANULOCYTES # BLD: 0 10E9/L (ref 0–0.4)
IMM GRANULOCYTES NFR BLD: 0.3 %
LACTATE SERPL-SCNC: 1.2 MMOL/L (ref 0.4–2)
LYMPHOCYTES # BLD AUTO: 0.7 10E9/L (ref 0.8–5.3)
LYMPHOCYTES NFR BLD AUTO: 7.8 %
MCH RBC QN AUTO: 30.5 PG (ref 26.5–33)
MCHC RBC AUTO-ENTMCNC: 35.3 G/DL (ref 31.5–36.5)
MCV RBC AUTO: 86 FL (ref 78–100)
MONOCYTES # BLD AUTO: 0.8 10E9/L (ref 0–1.3)
MONOCYTES NFR BLD AUTO: 8.1 %
NEUTROPHILS # BLD AUTO: 7.7 10E9/L (ref 1.6–8.3)
NEUTROPHILS NFR BLD AUTO: 82.5 %
NRBC # BLD AUTO: 0 10*3/UL
NRBC BLD AUTO-RTO: 0 /100
PLATELET # BLD AUTO: 214 10E9/L (ref 150–450)
POTASSIUM SERPL-SCNC: 3.8 MMOL/L (ref 3.4–5.3)
PROT SERPL-MCNC: 7.3 G/DL (ref 6.8–8.8)
RBC # BLD AUTO: 4.46 10E12/L (ref 4.4–5.9)
SODIUM SERPL-SCNC: 138 MMOL/L (ref 133–144)
WBC # BLD AUTO: 9.3 10E9/L (ref 4–11)

## 2017-06-07 PROCEDURE — 99214 OFFICE O/P EST MOD 30 MIN: CPT | Mod: 25

## 2017-06-07 PROCEDURE — 96372 THER/PROPH/DIAG INJ SC/IM: CPT

## 2017-06-07 PROCEDURE — 71020 ZZHC CHEST TWO VIEWS, FRONT/LAT: CPT | Mod: TC

## 2017-06-07 PROCEDURE — 25000132 ZZH RX MED GY IP 250 OP 250 PS 637

## 2017-06-07 PROCEDURE — 36415 COLL VENOUS BLD VENIPUNCTURE: CPT

## 2017-06-07 PROCEDURE — 83605 ASSAY OF LACTIC ACID: CPT

## 2017-06-07 PROCEDURE — 80053 COMPREHEN METABOLIC PANEL: CPT

## 2017-06-07 PROCEDURE — 85025 COMPLETE CBC W/AUTO DIFF WBC: CPT

## 2017-06-07 PROCEDURE — 99214 OFFICE O/P EST MOD 30 MIN: CPT

## 2017-06-07 PROCEDURE — 25000125 ZZHC RX 250

## 2017-06-07 PROCEDURE — 25000128 H RX IP 250 OP 636

## 2017-06-07 RX ORDER — LEVOFLOXACIN 750 MG/1
750 TABLET, FILM COATED ORAL DAILY
Qty: 7 TABLET | Refills: 0 | Status: SHIPPED | OUTPATIENT
Start: 2017-06-07 | End: 2017-06-13

## 2017-06-07 RX ORDER — ACETAMINOPHEN 325 MG/1
975 TABLET ORAL ONCE
Status: COMPLETED | OUTPATIENT
Start: 2017-06-07 | End: 2017-06-07

## 2017-06-07 RX ORDER — CEFTRIAXONE SODIUM 1 G
1 VIAL (EA) INJECTION ONCE
Status: COMPLETED | OUTPATIENT
Start: 2017-06-07 | End: 2017-06-07

## 2017-06-07 RX ORDER — IBUPROFEN 800 MG/1
800 TABLET, FILM COATED ORAL ONCE
Status: COMPLETED | OUTPATIENT
Start: 2017-06-07 | End: 2017-06-07

## 2017-06-07 RX ORDER — ALBUTEROL SULFATE 90 UG/1
2 AEROSOL, METERED RESPIRATORY (INHALATION) EVERY 6 HOURS PRN
Qty: 1 INHALER | Refills: 0 | Status: SHIPPED | OUTPATIENT
Start: 2017-06-07 | End: 2019-04-11

## 2017-06-07 RX ADMIN — CEFTRIAXONE SODIUM 1 G: 1 INJECTION, POWDER, FOR SOLUTION INTRAMUSCULAR; INTRAVENOUS at 16:27

## 2017-06-07 RX ADMIN — IBUPROFEN 800 MG: 800 TABLET ORAL at 15:38

## 2017-06-07 RX ADMIN — ACETAMINOPHEN 975 MG: 325 TABLET, FILM COATED ORAL at 15:38

## 2017-06-07 ASSESSMENT — ENCOUNTER SYMPTOMS
NAUSEA: 1
ARTHRALGIAS: 1
CHILLS: 1
ENDOCRINE NEGATIVE: 1
FEVER: 1
RHINORRHEA: 1
APPETITE CHANGE: 1
FATIGUE: 1
SORE THROAT: 1
COUGH: 1
MYALGIAS: 1
EYES NEGATIVE: 1
ACTIVITY CHANGE: 1
VOMITING: 1

## 2017-06-07 NOTE — ED AVS SNAPSHOT
[facility]    [address]    [place] [address]    Phone:  [phone]                                       Dann Espinoza   MRN: [id number]    Department:  [facility]   Date of Visit:  6/7/2017           After Visit Summary Signature Page     I have received my discharge instructions, and my questions have been answered. I have discussed any challenges I see with this plan with the nurse or doctor.    ..........................................................................................................................................  Patient/Patient Representative Signature      ..........................................................................................................................................  Patient Representative Print Name and Relationship to Patient    ..................................................               ................................................  Date                                            Time    ..........................................................................................................................................  Reviewed by Signature/Title    ...................................................              ..............................................  Date                                                            Time

## 2017-06-07 NOTE — ED PROVIDER NOTES
History     Chief Complaint   Patient presents with     Cough     c/o cough and fever, productive cough with greenish yellow returns     HPI  Dann Espinoza is a 71 year old male who presents to  with wife for evaluation of cough, congestion, fever. Onset of cough, clear rhinorrhea 2-3 days ago, fever this afternoon. Associated fatigue, myalgias. Denies rash.     I have reviewed the Medications, Allergies, Past Medical and Surgical History, and Social History in the Epic system.    Review of Systems   Constitutional: Positive for activity change, appetite change, chills, fatigue and fever.   HENT: Positive for congestion, rhinorrhea and sore throat.    Eyes: Negative.    Respiratory: Positive for cough.    Gastrointestinal: Positive for nausea and vomiting.   Endocrine: Negative.    Genitourinary: Negative.    Musculoskeletal: Positive for arthralgias and myalgias.   Skin: Negative for rash.       Physical Exam   BP: 153/85  Heart Rate: 107  Temp: (!) 102.3  F (39.1  C)  Resp: 18  SpO2: 95 %  Physical Exam   Constitutional: He is oriented to person, place, and time. No distress.   HENT:   Head: Normocephalic and atraumatic.   Right Ear: Tympanic membrane normal.   Left Ear: Tympanic membrane normal.   Nose: Rhinorrhea present.   Mouth/Throat: Uvula is midline, oropharynx is clear and moist and mucous membranes are normal.   Neck: Normal range of motion. Neck supple.   Cardiovascular: Normal rate and regular rhythm.    Pulmonary/Chest: Effort normal. He has rhonchi in the right middle field and the left middle field. He has rales in the right lower field.   Abdominal: Soft. Bowel sounds are normal. He exhibits no distension.   Musculoskeletal: Normal range of motion.   Lymphadenopathy:     He has no cervical adenopathy.   Neurological: He is alert and oriented to person, place, and time.   Skin: Skin is warm. He is not diaphoretic.   Nursing note and vitals reviewed.      ED Course   Procedures        Labs  Ordered and Resulted from Time of ED Arrival Up to the Time of Departure from the ED   CBC WITH PLATELETS DIFFERENTIAL - Abnormal; Notable for the following:        Result Value    Hematocrit 38.5 (*)     Absolute Lymphocytes 0.7 (*)     All other components within normal limits   COMPREHENSIVE METABOLIC PANEL - Abnormal; Notable for the following:     Glucose 116 (*)     Albumin 3.3 (*)     All other components within normal limits   LACTIC ACID     CHEST TWO VIEWS    FINDINGS:  There is abnormal increase in density at the right lung  base, consistent with atelectasis or pneumonia.  Heart and pulmonary  vessels are within normal limits.    IMPRESSION:  RIGHT BASILAR OPACITY, CONSISTENT WITH ATELECTASIS OR  PNEUMONIA.  Exam Date: Jun 07, 2017 03:58:00 PM  Author: KIT JORDAN    Assessments & Plan (with Medical Decision Making)   Pt presents with productive cough x 3 day, fever today. Temp 102.3 on arrival. .   Tylenol, ibuprofen given.   Exam reveals crackles left base  Labs unremarkable.   Findings most consistent with RLL pneumonia. IM rocephin given in the UC. RX for Levaquin given. Pt verbalizes understanding and agrees with plan.  Epic discharge instructions given. Pt discharged with wife in stable condition.     I have reviewed the nursing notes.    I have reviewed the findings, diagnosis, plan and need for follow up with the patient.    Discharge Medication List as of 6/7/2017  4:50 PM      START taking these medications    Details   levofloxacin (LEVAQUIN) 750 MG tablet Take 1 tablet (750 mg) by mouth daily, Disp-7 tablet, R-0, E-Prescribe      albuterol (PROAIR HFA/PROVENTIL HFA/VENTOLIN HFA) 108 (90 BASE) MCG/ACT Inhaler Inhale 2 puffs into the lungs every 6 hours as needed for shortness of breath / dyspnea or wheezing, Disp-1 Inhaler, R-0, E-Prescribe             Final diagnoses:   Pneumonia of right lower lobe due to infectious organism     See attached for home care  Rest, increase  fluids  Take all of antibiotic as prescribed  Use inhaler as needed for cough  Tylenol, ibuprofen for pain, fever  F/U with PCP in 1 week for recheck   Return to ED/UC with worsening symptoms.   6/7/2017   [facility]     Luz Marina Lyons APRN FNP  06/07/17 [id number]

## 2017-06-07 NOTE — ED NOTES
Patient presents with cough and sinus X 3 days.  Sinus drainage is clear.  Patient has vomited X once today.  Fever started today.

## 2017-06-07 NOTE — ED AVS SNAPSHOT
[facility]    [address]    [place] [address]    Phone:  [phone]                                       Dann Espinoza   MRN: [id number]    Department:  [facility]   Date of Visit:  6/7/2017           Patient Information     Date Of Birth          1945        Your diagnoses for this visit were:     Pneumonia of right lower lobe due to infectious organism        You were seen by Luz Marina Lyons APRN FNP.      Follow-up Information     Follow up with WASHINGTON Dey MD In 1 week.    Specialty:  Family Practice    Why:  recheck    Contact information:    [facility] [place]  [address]  [place] [address] [phone]          Follow up with [facility].    Specialty:  EMERGENCY MEDICINE    Why:  As needed, If symptoms worsen    Contact information:    [address]  [place] [address] [phone]    Additional information:    From [facility]: Take [address]. Turn left at [address]. Turn left at the first stoplight on [address]. At the first stop sign, take a right onto [address]. Take a left into the parking lot and continue through until you reach the North enterance of the building.       From [place]: Take [address]. Take the [address] ramp towards [place]. Turn left onto [address]. Take a slight right onto [address]. Turn left at the first stoplight on [address]. At the first stop sign, take a right onto [address]. Take a left into the parking lot and continue through until you reach the North enterance of the building.       From [place]: Take [address]. Take a right at [address]. At the first stop sign, take a right onto [address]. Take a left into the parking lot and continue through until you reach the North enterance of the building.         Discharge Instructions       See attached for home care  Rest, increase  fluids  Take all of antibiotic as prescribed  Use inhaler as needed for cough  Tylenol, ibuprofen for pain, fever  F/U with PCP in 1 week for recheck   Return to ED/UC with worsening symptoms.         Pneumonia (Adult)  Pneumonia is an infection deep within the lungs. It is in the small air sacs (alveoli). Pneumonia may be caused by a virus or bacteria. Pneumonia caused by bacteria is usually treated with an antibiotic. Severe cases may need to be treated in the hospital. Milder cases can be treated at home. Symptoms usually start to get better during the first 2 days of treatment.    Home care  Follow these guidelines when caring for yourself at home:    Rest at home for the first 2 to 3 days, or until you feel stronger. Don t let yourself get overly tired when you go back to your activities.    Stay away from cigarette smoke - yours or other people s.    You may use acetaminophen or ibuprofen to control fever or pain, unless another medicine was prescribed. If you have chronic liver or kidney disease, talk with your health care provider before using these medicines. Also talk with your provider if you ve had a stomach ulcer or GI bleeding. Don t give aspirin to anyone younger than 18 years of age who is ill with a fever. It may cause severe liver damage.    Your appetite may be poor, so a light diet is fine.    Drink 6 to 8 glasses of fluids every day to make sure you are getting enough fluids. Beverages can include water, sport drinks, sodas without caffeine, juices, tea, or soup. Fluids will help loosen secretions in the lung. This will make it easier for you to cough up the phlegm (sputum). If you also have heart or kidney disease, check with your health care provider before you drink extra fluids.    Take antibiotic medicine prescribed until it is all gone, even if you are feeling better after a few days.  Follow-up care  Follow up with your health care provider in the next 2 to 3 days, or as advised. This is  to be sure the medicine is helping you get better.  If you are 65 or older, you should get a pneumococcal vaccine and a yearly flu (influenza) shot. You should also get these vaccines if you have chronic lung disease like asthma, emphysema, or COPD. Ask your provider about this.  When to seek medical advice  Call your health care provider right away if any of these occur:    You don t get better within the first 48 hours of treatment    Shortness of breath gets worse    Rapid breathing (more than 25 breaths per minute)    Coughing up blood    Chest pain gets worse with breathing    Fever of 102 F (38 C) or higher that doesn t get better with fever medicine    Weakness, dizziness, or fainting that gets worse    Thirst or dry mouth that gets worse    Sinus pain, headache, or a stiff neck    Chest pain not caused by coughing    4295-3607 [employer]. [address] [place] [address]. All rights reserved. This information is not intended as a substitute for professional medical care. Always follow your healthcare professional's instructions.             Review of your medicines      START taking        Dose / Directions Last dose taken    albuterol 108 (90 BASE) MCG/ACT Inhaler   Commonly known as:  PROAIR HFA/PROVENTIL HFA/VENTOLIN HFA   Dose:  2 puff   Quantity:  1 Inhaler        Inhale 2 puffs into the lungs every 6 hours as needed for shortness of breath / dyspnea or wheezing   Refills:  0        levofloxacin 750 MG tablet   Commonly known as:  LEVAQUIN   Dose:  750 mg   Quantity:  7 tablet        Take 1 tablet (750 mg) by mouth daily   Refills:  0          Our records show that you are taking the medicines listed below. If these are incorrect, please call your family doctor or clinic.        Dose / Directions Last dose taken    allopurinol 300 MG tablet   Commonly known as:  ZYLOPRIM   Dose:  0.5 tablet        Take 0.5 tablets by mouth daily.   Refills:  0        aspirin 81 MG EC tablet  "  Dose:  1 tablet   Generic drug:  aspirin        Take 1 tablet by mouth daily.   Refills:  0        ATORVASTATIN CALCIUM PO   Dose:  40 mg        Take 40 mg by mouth daily   Refills:  0        HYDROcodone-acetaminophen 5-325 MG per tablet   Commonly known as:  NORCO   Quantity:  60 tablet        TAKE ONE TABLET BY MOUTH EVRY 6 HOURS AS NEEDED FOR MODERATE TO SEVERE PAIN   Refills:  0        Vitamin D3 2000 UNITS Tabs   Dose:  1 tablet        Take 1 tablet by mouth daily.   Refills:  0                Prescriptions were sent or printed at these locations (2 Prescriptions)                   [facility] [address] [place] [address] [address]    [address] [facility] [address]    Telephone:  [phone]   Fax:  [phone]   Hours:                  E-Prescribed (2 of 2)         levofloxacin (LEVAQUIN) 750 MG tablet               albuterol (PROAIR HFA/PROVENTIL HFA/VENTOLIN HFA) 108 (90 BASE) MCG/ACT Inhaler                Procedures and tests performed during your visit     CBC with platelets differential    Comprehensive metabolic panel    Lactic acid    XR Chest 2 Views      Orders Needing Specimen Collection     None      Pending Results     Date and Time Order Name Status Description    6/7/2017 1531 XR Chest 2 Views Preliminary             Pending Culture Results     No orders found from 6/5/2017 to 6/8/2017.            Thank you for choosing [facility]       Thank you for choosing [facility] for your care. Our goal is always to provide you with excellent care. Hearing back from our patients is one way we can continue to improve our services. Please take a few minutes to complete the written survey that you may receive in the mail after you visit with us. Thank you!        InCytuhart Information     [employer] lets you send messages to your doctor, view your test results, renew your prescriptions, schedule appointments and more. To sign up, go to [email or web address] . Click on \"Log in\" on the " "left side of the screen, which will take you to the Welcome page. Then click on \"Sign up Now\" on the right side of the page.     You will be asked to enter the access code listed below, as well as some personal information. Please follow the directions to create your username and password.     Your access code is: [email or web address]  Expires: 9/3/2017 11:22 AM     Your access code will  in 90 days. If you need help or a new code, please call your [place] clinic or [phone].        Care EveryWhere ID     This is your Care EveryWhere ID. This could be used by other organizations to access your [place] medical records  [id number]        After Visit Summary       This is your record. Keep this with you and show to your community pharmacist(s) and doctor(s) at your next visit.                  "

## 2017-06-07 NOTE — DISCHARGE INSTRUCTIONS
See attached for home care  Rest, increase fluids  Take all of antibiotic as prescribed  Use inhaler as needed for cough  Tylenol, ibuprofen for pain, fever  F/U with PCP in 1 week for recheck   Return to ED/UC with worsening symptoms.         Pneumonia (Adult)  Pneumonia is an infection deep within the lungs. It is in the small air sacs (alveoli). Pneumonia may be caused by a virus or bacteria. Pneumonia caused by bacteria is usually treated with an antibiotic. Severe cases may need to be treated in the hospital. Milder cases can be treated at home. Symptoms usually start to get better during the first 2 days of treatment.    Home care  Follow these guidelines when caring for yourself at home:    Rest at home for the first 2 to 3 days, or until you feel stronger. Don t let yourself get overly tired when you go back to your activities.    Stay away from cigarette smoke - yours or other people s.    You may use acetaminophen or ibuprofen to control fever or pain, unless another medicine was prescribed. If you have chronic liver or kidney disease, talk with your health care provider before using these medicines. Also talk with your provider if you ve had a stomach ulcer or GI bleeding. Don t give aspirin to anyone younger than 18 years of age who is ill with a fever. It may cause severe liver damage.    Your appetite may be poor, so a light diet is fine.    Drink 6 to 8 glasses of fluids every day to make sure you are getting enough fluids. Beverages can include water, sport drinks, sodas without caffeine, juices, tea, or soup. Fluids will help loosen secretions in the lung. This will make it easier for you to cough up the phlegm (sputum). If you also have heart or kidney disease, check with your health care provider before you drink extra fluids.    Take antibiotic medicine prescribed until it is all gone, even if you are feeling better after a few days.  Follow-up care  Follow up with your health care provider in  the next 2 to 3 days, or as advised. This is to be sure the medicine is helping you get better.  If you are 65 or older, you should get a pneumococcal vaccine and a yearly flu (influenza) shot. You should also get these vaccines if you have chronic lung disease like asthma, emphysema, or COPD. Ask your provider about this.  When to seek medical advice  Call your health care provider right away if any of these occur:    You don t get better within the first 48 hours of treatment    Shortness of breath gets worse    Rapid breathing (more than 25 breaths per minute)    Coughing up blood    Chest pain gets worse with breathing    Fever of 102 F (38 C) or higher that doesn t get better with fever medicine    Weakness, dizziness, or fainting that gets worse    Thirst or dry mouth that gets worse    Sinus pain, headache, or a stiff neck    Chest pain not caused by coughing    6612-3013 [employer]. [address] [place] [address], [place] [address]. All rights reserved. This information is not intended as a substitute for professional medical care. Always follow your healthcare professional's instructions.

## 2017-06-10 NOTE — PROGRESS NOTES
Chest XR report routed to PCP, Dr. CHEN Dey.  Impression - right basilar opacity, consistent with atelectasis or pneumonia.  Pt has follow up appt with Dr. Dey on 6/20/17.

## 2017-06-13 ENCOUNTER — TELEPHONE (OUTPATIENT)
Dept: FAMILY MEDICINE | Facility: OTHER | Age: 72
End: 2017-06-13

## 2017-06-13 DIAGNOSIS — J18.9 PNEUMONIA DUE TO INFECTIOUS ORGANISM, UNSPECIFIED LATERALITY, UNSPECIFIED PART OF LUNG: Primary | ICD-10-CM

## 2017-06-13 RX ORDER — LEVOFLOXACIN 750 MG/1
750 TABLET, FILM COATED ORAL DAILY
Qty: 6 TABLET | Refills: 0 | Status: SHIPPED | OUTPATIENT
Start: 2017-06-13 | End: 2017-06-20

## 2017-06-13 NOTE — TELEPHONE ENCOUNTER
Reason for call:  Medication    1. Medication Name? Patient requesting at least 6 more of the levofloxacin (LEVAQUIN) 750 MG tablet - so he can make it until his appointment w/Dr ANA Dey on 6/20/17  2. Is this request for a refill? Yes  3. What Pharmacy do you use? [facility]  4. Have you contacted your pharmacy? No    5. If yes, when?  (Please note that the turn-around-time for prescriptions is 72 business hours; I am sending your request at this time. SEND TO  [facility]  )  Description: Patient requesting at least 6 more of the levofloxacin (LEVAQUIN) 750 MG tablet  Was an appointment offered for this a call? No   Preferred method for responding to this messageTelephone Call  If we cannot reach you directly, may we leave a detailed response at the number you provided? Yes  Can this message wait until your PCP/Provider returns if not available today? N/a provider is in

## 2017-06-20 ENCOUNTER — OFFICE VISIT (OUTPATIENT)
Dept: FAMILY MEDICINE | Facility: OTHER | Age: 72
End: 2017-06-20
Attending: FAMILY MEDICINE
Payer: COMMERCIAL

## 2017-06-20 VITALS
TEMPERATURE: 99.6 F | HEART RATE: 80 BPM | WEIGHT: 216 LBS | SYSTOLIC BLOOD PRESSURE: 112 MMHG | BODY MASS INDEX: 30.92 KG/M2 | DIASTOLIC BLOOD PRESSURE: 62 MMHG | OXYGEN SATURATION: 95 % | HEIGHT: 70 IN

## 2017-06-20 DIAGNOSIS — J18.9 PNEUMONIA OF RIGHT LOWER LOBE DUE TO INFECTIOUS ORGANISM: Primary | ICD-10-CM

## 2017-06-20 PROCEDURE — 96372 THER/PROPH/DIAG INJ SC/IM: CPT | Performed by: FAMILY MEDICINE

## 2017-06-20 PROCEDURE — 99214 OFFICE O/P EST MOD 30 MIN: CPT | Performed by: FAMILY MEDICINE

## 2017-06-20 PROCEDURE — 99212 OFFICE O/P EST SF 10 MIN: CPT

## 2017-06-20 RX ORDER — AZITHROMYCIN 250 MG/1
TABLET, FILM COATED ORAL
Qty: 6 TABLET | Refills: 0 | Status: SHIPPED | OUTPATIENT
Start: 2017-06-20 | End: 2017-07-06

## 2017-06-20 RX ORDER — CEFTRIAXONE 1 G/1
1000 INJECTION, POWDER, FOR SOLUTION INTRAMUSCULAR; INTRAVENOUS ONCE
Qty: 10 ML | Refills: 0 | OUTPATIENT
Start: 2017-06-20 | End: 2017-06-20

## 2017-06-20 ASSESSMENT — PAIN SCALES - GENERAL: PAINLEVEL: NO PAIN (0)

## 2017-06-20 NOTE — NURSING NOTE
MEDICATION: Rocephin 1g and Lidocaine 2.1cc  ROUTE: IM  SITE: Q - Gluteus  DOSE: 1g  LOT #: [id number]  :  Antony  EXPIRATION DATE:  11/01/2019  NDC#: 7505-8435-23

## 2017-06-20 NOTE — NURSING NOTE
"Chief Complaint   Patient presents with     Cough     Follow up from the [facility] dx Pneumonia. stopped antibiotic= hives an felt sick. did get 2 rounds of antibiotics. Still coughing- dry        Initial /62 (BP Location: Left arm, Patient Position: Chair, Cuff Size: Adult Large)  Pulse 81  Temp 99.6  F (37.6  C) (Tympanic)  Ht 5' 10\" (1.778 m)  Wt 216 lb (98 kg)  SpO2 94%  BMI 30.99 kg/m2 Estimated body mass index is 30.99 kg/(m^2) as calculated from the following:    Height as of this encounter: 5' 10\" (1.778 m).    Weight as of this encounter: 216 lb (98 kg).  Medication Reconciliation: complete     ROSA HAYWARD      "

## 2017-06-20 NOTE — MR AVS SNAPSHOT
"              After Visit Summary   6/20/2017    Dann Espinoza    MRN: [id number]           Patient Information     Date Of Birth          1945        Visit Information        Provider Department      6/20/2017 11:00 AM WASHINGTON Dey MD [facility]        Today's Diagnoses     Pneumonia of right lower lobe due to infectious organism    -  1      Care Instructions    Return July 6          Follow-ups after your visit        Your next 10 appointments already scheduled     Jun 20, 2017 11:00 AM CDT   (Arrive by 10:45 AM)   Office Visit with WASHINGTON Dey MD   [facility] [place] [facility]    [address] [place] [address]   [phone]           Bring a current list of meds and any records pertaining to this visit.  For Physicals, please bring immunization records and any forms needing to be filled out.    Please arrive 15 minutes early to complete paperwork and register.              Who to contact     If you have questions or need follow up information about today's clinic visit or your schedule please contact [facility] directly at [phone].  Normal or non-critical lab and imaging results will be communicated to you by Askablogrhart, letter or phone within 4 business days after the clinic has received the results. If you do not hear from us within 7 days, please contact the clinic through Askablogrhart or phone. If you have a critical or abnormal lab result, we will notify you by phone as soon as possible.  Submit refill requests through [employer] or call your pharmacy and they will forward the refill request to us. Please allow 3 business days for your refill to be completed.          Additional Information About Your Visit        MyChart Information     [employer] lets you send messages to your doctor, view your test results, renew your prescriptions, schedule appointments and more. To sign up, go to [email or web address] . Click on \"Log in\" on the " "left side of the screen, which will take you to the Welcome page. Then click on \"Sign up Now\" on the right side of the page.     You will be asked to enter the access code listed below, as well as some personal information. Please follow the directions to create your username and password.     Your access code is: [email or web address]  Expires: 9/3/2017 11:22 AM     Your access code will  in 90 days. If you need help or a new code, please call your [facility] or [phone].        Care EveryWhere ID     This is your Care EveryWhere ID. This could be used by other organizations to access your [place] medical records  [id number]        Your Vitals Were     Pulse Temperature Height Pulse Oximetry BMI (Body Mass Index)       80 99.6  F (37.6  C) (Tympanic) 5' 10\" (1.778 m) 95% 30.99 kg/m2        Blood Pressure from Last 3 Encounters:   17 112/62   17 153/85   17 110/62    Weight from Last 3 Encounters:   17 216 lb (98 kg)   17 220 lb (99.8 kg)   17 219 lb (99.3 kg)              Today, you had the following     No orders found for display         Today's Medication Changes          These changes are accurate as of: 17 10:59 AM.  If you have any questions, ask your nurse or doctor.               Start taking these medicines.        Dose/Directions    azithromycin 250 MG tablet   Commonly known as:  ZITHROMAX Z-MAUDE   Used for:  Pneumonia of right lower lobe due to infectious organism   Started by:  WASHINGTON Dey MD        2 today then 1 daily for 4 days.   Quantity:  6 tablet   Refills:  0       cefTRIAXone 1 GM vial   Commonly known as:  ROCEPHIN   Used for:  Pneumonia of right lower lobe due to infectious organism   Started by:  WASHINGTON Dey MD        Dose:  1000 mg   Inject 1 g (1,000 mg) into the vein once for 1 dose   Quantity:  10 mL   Refills:  0            Where to get your medicines      These medications were sent to [address] [place] - " [address]   [address] , [facility] [address]     Phone:  [phone]     azithromycin 250 MG tablet         Some of these will need a paper prescription and others can be bought over the counter.  Ask your nurse if you have questions.     You don't need a prescription for these medications     cefTRIAXone 1 GM vial                Primary Care Provider Office Phone # Fax #    R Navneet Dey [phone]       [facility] [address] [place] [address]        Thank you!     Thank you for choosing [facility]  for your care. Our goal is always to provide you with excellent care. Hearing back from our patients is one way we can continue to improve our services. Please take a few minutes to complete the written survey that you may receive in the mail after your visit with us. Thank you!             Your Updated Medication List - Protect others around you: Learn how to safely use, store and throw away your medicines at www.disposemymeds.org.          This list is accurate as of: 6/20/17 10:59 AM.  Always use your most recent med list.                   Brand Name Dispense Instructions for use    albuterol 108 (90 BASE) MCG/ACT Inhaler    PROAIR HFA/PROVENTIL HFA/VENTOLIN HFA    1 Inhaler    Inhale 2 puffs into the lungs every 6 hours as needed for shortness of breath / dyspnea or wheezing       allopurinol 300 MG tablet    ZYLOPRIM     Take 0.5 tablets by mouth daily.       aspirin 81 MG EC tablet   Generic drug:  aspirin      Take 1 tablet by mouth daily.       ATORVASTATIN CALCIUM PO      Take 40 mg by mouth daily       azithromycin 250 MG tablet    ZITHROMAX Z-MAUDE    6 tablet    2 today then 1 daily for 4 days.       cefTRIAXone 1 GM vial    ROCEPHIN    10 mL    Inject 1 g (1,000 mg) into the vein once for 1 dose       HYDROcodone-acetaminophen 5-325 MG per tablet    NORCO    60 tablet    TAKE ONE TABLET BY MOUTH EVRY 6 HOURS AS NEEDED FOR MODERATE  TO SEVERE PAIN       Vitamin D3 2000 UNITS Tabs      Take 1 tablet by mouth daily.

## 2017-06-20 NOTE — PROGRESS NOTES
[facility] [place] [facility]    Dann Espinoza, 71 year old, male presents with   Chief Complaint   Patient presents with     Cough     Follow up from the [facility] dx Pneumonia. stopped antibiotic= hives an felt sick. did get 2 rounds of antibiotics. Still coughing- dry. Using an inhaler.  No angina symptoms.  No leg swelling.  No fevers.  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 Authorizing Provider   cefTRIAXone (ROCEPHIN) 1 GM vial Inject 1 g (1,000 mg) into the vein once for 1 dose 6/20/17 6/20/17 Yes WASHINGTON Dey MD   azithromycin (ZITHROMAX Z-MAUDE) 250 MG tablet 2 today then 1 daily for 4 days. 6/20/17  Yes WASHINGTON Dey MD   albuterol (PROAIR HFA/PROVENTIL HFA/VENTOLIN HFA) 108 (90 BASE) MCG/ACT Inhaler Inhale 2 puffs into the lungs every 6 hours as needed for shortness of breath / dyspnea or wheezing 6/7/17  Yes Luz Marina Lyons APRN FNP   HYDROcodone-acetaminophen (NORCO) 5-325 MG per tablet TAKE ONE TABLET BY MOUTH EVRY 6 HOURS AS NEEDED FOR MODERATE TO SEVERE PAIN 5/31/17  Yes Massimo Berry MD   ATORVASTATIN CALCIUM PO Take 40 mg by mouth daily   Yes Reported, Patient   allopurinol (ZYLOPRIM) 300 MG tablet Take 0.5 tablets by mouth daily.   Yes Reported, Patient   aspirin (ASPIR-81) 81 MG EC tablet Take 1 tablet by mouth daily.   Yes Reported, Patient   Cholecalciferol (VITAMIN  "D3) 2000 UNITS TABS Take 1 tablet by mouth daily.   Yes Reported, Patient       ALLERGIES:     Allergies   Allergen Reactions     Levaquin [Levofloxacin] Rash       ROS:  Constitutional, neuro, ENT, endocrine, pulmonary, cardiac, gastrointestinal, genitourinary, musculoskeletal, integument and psychiatric systems are negative, except as otherwise noted.      EXAM:  /62 (BP Location: Left arm, Patient Position: Chair, Cuff Size: Adult Large)  Pulse 80  Temp 99.6  F (37.6  C) (Tympanic)  Ht 5' 10\" (1.778 m)  Wt 216 lb (98 kg)  SpO2 95%  BMI 30.99 kg/m2 Body mass index is 30.99 kg/(m^2).   GENERAL APPEARANCE: healthy, alert and no distress  EYES: Eyes grossly normal to inspection, PERRL and conjunctivae and sclerae normal  HENT: ear canals and TM's normal and nose and mouth without ulcers or lesions  NECK: no adenopathy, no asymmetry, masses, or scars and thyroid normal to palpation  RESP: few rhonchi right base  CV: regular rates and rhythm, normal S1 S2, no S3 or S4 and no murmur, click or rub  NEURO: Normal strength and tone, mentation intact and speech normal  PSYCH: mentation appears normal and affect normal/bright  Lab/ X-ray  Results for orders placed or performed during the hospital encounter of 06/07/17   XR Chest 2 Views    Narrative    CHEST TWO VIEWS    FINDINGS:  There is abnormal increase in density at the right lung  base, consistent with atelectasis or pneumonia.  Heart and pulmonary  vessels are within normal limits.    IMPRESSION:  RIGHT BASILAR OPACITY, CONSISTENT WITH ATELECTASIS OR  PNEUMONIA.  Exam Date: Jun 07, 2017 03:58:00 PM  Author: KIT JORDAN  This report is final and signed     CBC with platelets differential   Result Value Ref Range    WBC 9.3 4.0 - 11.0 10e9/L    RBC Count 4.46 4.4 - 5.9 10e12/L    Hemoglobin 13.6 13.3 - 17.7 g/dL    Hematocrit 38.5 (L) 40.0 - 53.0 %    MCV 86 78 - 100 fl    MCH 30.5 26.5 - 33.0 pg    MCHC 35.3 31.5 - 36.5 g/dL    RDW 13.8 10.0 - 15.0 % "    Platelet Count 214 150 - 450 10e9/L    Diff Method Automated Method     % Neutrophils 82.5 %    % Lymphocytes 7.8 %    % Monocytes 8.1 %    % Eosinophils 1.1 %    % Basophils 0.2 %    % Immature Granulocytes 0.3 %    Nucleated RBCs 0 0 /100    Absolute Neutrophil 7.7 1.6 - 8.3 10e9/L    Absolute Lymphocytes 0.7 (L) 0.8 - 5.3 10e9/L    Absolute Monocytes 0.8 0.0 - 1.3 10e9/L    Absolute Eosinophils 0.1 0.0 - 0.7 10e9/L    Absolute Basophils 0.0 0.0 - 0.2 10e9/L    Abs Immature Granulocytes 0.0 0 - 0.4 10e9/L    Absolute Nucleated RBC 0.0    Comprehensive metabolic panel   Result Value Ref Range    Sodium 138 133 - 144 mmol/L    Potassium 3.8 3.4 - 5.3 mmol/L    Chloride 103 94 - 109 mmol/L    Carbon Dioxide 29 20 - 32 mmol/L    Anion Gap 6 3 - 14 mmol/L    Glucose 116 (H) 70 - 99 mg/dL    Urea Nitrogen 17 7 - 30 mg/dL    Creatinine 1.09 0.66 - 1.25 mg/dL    GFR Estimate 67 >60 mL/min/1.7m2    GFR Estimate If Black 81 >60 mL/min/1.7m2    Calcium 9.6 8.5 - 10.1 mg/dL    Bilirubin Total 0.6 0.2 - 1.3 mg/dL    Albumin 3.3 (L) 3.4 - 5.0 g/dL    Protein Total 7.3 6.8 - 8.8 g/dL    Alkaline Phosphatase 110 40 - 150 U/L    ALT 36 0 - 70 U/L    AST 19 0 - 45 U/L   Lactic acid   Result Value Ref Range    Lactic Acid 1.2 0.4 - 2.0 mmol/L         ASSESSMENT/PLAN:    ICD-10-CM    1. Pneumonia of right lower lobe due to infectious organism J18.9 cefTRIAXone (ROCEPHIN) 1 GM vial. He developed a rash and had to stop the Levaquin.  Request to get a antibiotic shot was given Rocephin and will do a Z-Maude.  Overall I think he is improved.  We'll see him in July 6 to get a follow up chest x-ray.  Here with his wife.  Currently nonsmoker but has a remote history of smoking.     azithromycin (ZITHROMAX Z-MAUDE) 250 MG tablet     ROCEPHIN 250 MG VIAL     THER/PROPH/DIAG INJ, SC/IM         RJeremiah Dey MD  June 20, 2017

## 2017-07-06 ENCOUNTER — OFFICE VISIT (OUTPATIENT)
Dept: FAMILY MEDICINE | Facility: OTHER | Age: 72
End: 2017-07-06
Attending: FAMILY MEDICINE
Payer: COMMERCIAL

## 2017-07-06 VITALS
SYSTOLIC BLOOD PRESSURE: 118 MMHG | OXYGEN SATURATION: 95 % | HEART RATE: 70 BPM | DIASTOLIC BLOOD PRESSURE: 64 MMHG | WEIGHT: 216 LBS | BODY MASS INDEX: 30.99 KG/M2 | TEMPERATURE: 99 F

## 2017-07-06 DIAGNOSIS — M25.552 LEFT HIP PAIN: ICD-10-CM

## 2017-07-06 DIAGNOSIS — J18.9 PNEUMONIA OF RIGHT LOWER LOBE DUE TO INFECTIOUS ORGANISM: Primary | ICD-10-CM

## 2017-07-06 PROCEDURE — 99212 OFFICE O/P EST SF 10 MIN: CPT | Mod: 25

## 2017-07-06 PROCEDURE — 99213 OFFICE O/P EST LOW 20 MIN: CPT | Mod: 25 | Performed by: FAMILY MEDICINE

## 2017-07-06 PROCEDURE — 71020 ZZHC CHEST TWO VIEWS, FRONT/LAT: CPT | Mod: TC

## 2017-07-06 RX ORDER — HYDROCODONE BITARTRATE AND ACETAMINOPHEN 5; 325 MG/1; MG/1
TABLET ORAL
Qty: 60 TABLET | Refills: 0 | Status: SHIPPED | OUTPATIENT
Start: 2017-07-06 | End: 2017-08-16

## 2017-07-06 ASSESSMENT — PAIN SCALES - GENERAL: PAINLEVEL: NO PAIN (0)

## 2017-07-06 NOTE — NURSING NOTE
"Chief Complaint   Patient presents with     Cough       Initial /64 (BP Location: Left arm, Patient Position: Chair, Cuff Size: Adult Large)  Pulse 70  Temp 99  F (37.2  C) (Tympanic)  Wt 216 lb (98 kg)  SpO2 95%  BMI 30.99 kg/m2 Estimated body mass index is 30.99 kg/(m^2) as calculated from the following:    Height as of 6/20/17: 5' 10\" (1.778 m).    Weight as of this encounter: 216 lb (98 kg).  Medication Reconciliation: complete   Estefnay Jacob CMA(AAMA)   "

## 2017-07-06 NOTE — MR AVS SNAPSHOT
After Visit Summary   7/6/2017    Dann Espinoza    MRN: [id number]           Patient Information     Date Of Birth          1945        Visit Information        Provider Department      7/6/2017 10:45 AM W[facility] Dey MD [facility]        Today's Diagnoses     Pneumonia of right lower lobe due to infectious organism (H)    -  1      Care Instructions    We will call with the report.            Follow-ups after your visit        Your next 10 appointments already scheduled     Jul 06, 2017 10:45 AM CDT   (Arrive by 10:30 AM)   Office Visit with WASHINGTON Dey MD   [facility] [place] ([facility] [place] )    [address]  [place] [address]   [phone]           Bring a current list of meds and any records pertaining to this visit.  For Physicals, please bring immunization records and any forms needing to be filled out.    Please arrive 15 minutes early to complete paperwork and register.              Who to contact     If you have questions or need follow up information about today's clinic visit or your schedule please contact [facility] directly at [phone].  Normal or non-critical lab and imaging results will be communicated to you by Tilckhart, letter or phone within 4 business days after the clinic has received the results. If you do not hear from us within 7 days, please contact the clinic through Tilckhart or phone. If you have a critical or abnormal lab result, we will notify you by phone as soon as possible.  Submit refill requests through PROSimity or call your pharmacy and they will forward the refill request to us. Please allow 3 business days for your refill to be completed.          Additional Information About Your Visit        MyChart Information     PROSimity lets you send messages to your doctor, view your test results, renew your prescriptions, schedule appointments and more. To sign up, go to [email or web address] . Click  "on \"Log in\" on the left side of the screen, which will take you to the Welcome page. Then click on \"Sign up Now\" on the right side of the page.     You will be asked to enter the access code listed below, as well as some personal information. Please follow the directions to create your username and password.     Your access code is: [email or web address]  Expires: 9/3/2017 11:22 AM     Your access code will  in 90 days. If you need help or a new code, please call your [facility] clinic or [phone].        Care EveryWhere ID     This is your Care EveryWhere ID. This could be used by other organizations to access your [facility] medical records  [id number]        Your Vitals Were     Pulse Temperature Pulse Oximetry BMI (Body Mass Index)          70 99  F (37.2  C) (Tympanic) 95% 30.99 kg/m2         Blood Pressure from Last 3 Encounters:   17 118/64   17 112/62   17 153/85    Weight from Last 3 Encounters:   17 216 lb (98 kg)   17 216 lb (98 kg)   17 220 lb (99.8 kg)              We Performed the Following     XR Chest 2 Views          Where to get your medicines      Some of these will need a paper prescription and others can be bought over the counter.  Ask your nurse if you have questions.     Bring a paper prescription for each of these medications     HYDROcodone-acetaminophen 5-325 MG per tablet          Primary Care Provider Office Phone # Fax #    R Navneet Dey [phone]       [facility] [place] [address] [place], [place] [id number]        Equal Access to Services     [facility] DENIS : Hadwayne Yuan, dana wahl, fallon cid. So [facility] [phone].    ATENCIÓN: Si habla español, tiene a elder disposición servicios gratuitos de asistencia lingüística. Llame al [phone].    We comply with applicable federal civil rights laws and [place] laws. We do not discriminate on the " basis of race, color, national origin, age, disability sex, sexual orientation or gender identity.            Thank you!     Thank you for choosing [facility]  for your [facility]. Our goal is always to provide you with excellent care. Hearing back from our patients is one way we can continue to improve our services. Please take a few minutes to complete the written survey that you may receive in the mail after your visit with us. Thank you!             Your Updated Medication List - Protect others around you: Learn how to safely use, store and throw away your medicines at www.disposemymeds.org.          This list is accurate as of: 7/6/17 10:37 AM.  Always use your most recent med list.                   Brand Name Dispense Instructions for use Diagnosis    albuterol 108 (90 BASE) MCG/ACT Inhaler    PROAIR HFA/PROVENTIL HFA/VENTOLIN HFA    1 Inhaler    Inhale 2 puffs into the lungs every 6 hours as needed for shortness of breath / dyspnea or wheezing        allopurinol 300 MG tablet    ZYLOPRIM     Take 0.5 tablets by mouth daily.        aspirin 81 MG EC tablet   Generic drug:  aspirin      Take 1 tablet by mouth daily.        ATORVASTATIN CALCIUM PO      Take 40 mg by mouth daily        HYDROcodone-acetaminophen 5-325 MG per tablet    NORCO    60 tablet    TAKE ONE TABLET BY MOUTH EVRY 6 HOURS AS NEEDED FOR MODERATE TO SEVERE PAIN    Left hip pain       Vitamin D3 2000 UNITS Tabs      Take 1 tablet by mouth daily.

## 2017-07-06 NOTE — PROGRESS NOTES
SUBJECTIVE:                                                    Dann Espinoza is a 71 year old male who presents to clinic today for the following health issues:        RESPIRATORY SYMPTOMS      Duration: 1 month follow up     Description  cough and follow up pneumonia- patient states improved and barely has a cough now     Severity: mild    Accompanying signs and symptoms: None    History (predisposing factors):  pneumonia    Precipitating or alleviating factors: None    Therapies tried and outcome:  Zithromax           Problem list and histories reviewed & adjusted, as indicated.  Additional history: as documented    Current Outpatient Prescriptions   Medication Sig Dispense Refill     HYDROcodone-acetaminophen (NORCO) 5-325 MG per tablet TAKE ONE TABLET BY MOUTH EVRY 6 HOURS AS NEEDED FOR MODERATE TO SEVERE PAIN 60 tablet 0     albuterol (PROAIR HFA/PROVENTIL HFA/VENTOLIN HFA) 108 (90 BASE) MCG/ACT Inhaler Inhale 2 puffs into the lungs every 6 hours as needed for shortness of breath / dyspnea or wheezing 1 Inhaler 0     ATORVASTATIN CALCIUM PO Take 40 mg by mouth daily       allopurinol (ZYLOPRIM) 300 MG tablet Take 0.5 tablets by mouth daily.       aspirin (ASPIR-81) 81 MG EC tablet Take 1 tablet by mouth daily.       Cholecalciferol (VITAMIN D3) 2000 UNITS TABS Take 1 tablet by mouth daily.       Allergies   Allergen Reactions     Levaquin [Levofloxacin] Rash     BP Readings from Last 3 Encounters:   07/06/17 118/64   06/20/17 112/62   06/07/17 153/85    Wt Readings from Last 3 Encounters:   07/06/17 216 lb (98 kg)   06/20/17 216 lb (98 kg)   06/05/17 220 lb (99.8 kg)                    Reviewed and updated as needed this visit by clinical staff  Tobacco  Allergies  Meds  Med Hx  Surg Hx  Fam Hx  Soc Hx      Reviewed and updated as needed this visit by Provider         ROS:  Constitutional, HEENT, cardiovascular, pulmonary, gi and gu systems are negative, except as otherwise noted.    OBJECTIVE:     BP  118/64 (BP Location: Left arm, Patient Position: Chair, Cuff Size: Adult Large)  Pulse 70  Temp 99  F (37.2  C) (Tympanic)  Wt 216 lb (98 kg)  SpO2 95%  BMI 30.99 kg/m2  Body mass index is 30.99 kg/(m^2).  GENERAL: healthy, alert and no distress  NECK: no adenopathy, no asymmetry, masses, or scars and thyroid normal to palpation  RESP: lungs clear to auscultation - no rales, rhonchi or wheezes  CV: regular rate and rhythm, normal S1 S2, no S3 or S4, no murmur, click or rub, no peripheral edema and peripheral pulses strong  ABDOMEN: soft, nontender, no hepatosplenomegaly, no masses and bowel sounds normal  MS: no gross musculoskeletal defects noted, no edema    Diagnostic Test Results:  Preliminary chest x-ray shows resolution of infiltrate    ASSESSMENT/PLAN:           ICD-10-CM    1. Pneumonia of right lower lobe due to infectious organism (H) J18.1 XR Chest 2 Views done and shows resolution of infiltrate. This is a  4 week follow up.  We'll call with the official report from radiologist.  He has chronic pain and his pain tablets were filled today.           WASHINGTON Dey MD  [facility]

## 2017-07-06 NOTE — TELEPHONE ENCOUNTER
Left message that the written RX is ready at the [facility] [place]  registration to be picked up.

## 2017-08-16 DIAGNOSIS — M25.552 HIP PAIN, LEFT: Primary | ICD-10-CM

## 2017-08-16 DIAGNOSIS — M25.552 LEFT HIP PAIN: ICD-10-CM

## 2017-08-16 RX ORDER — HYDROCODONE BITARTRATE AND ACETAMINOPHEN 5; 325 MG/1; MG/1
TABLET ORAL
Qty: 60 TABLET | Refills: 0 | Status: SHIPPED | OUTPATIENT
Start: 2017-08-16 | End: 2017-09-06

## 2017-09-06 DIAGNOSIS — M25.552 HIP PAIN, LEFT: ICD-10-CM

## 2017-09-06 RX ORDER — HYDROCODONE BITARTRATE AND ACETAMINOPHEN 5; 325 MG/1; MG/1
TABLET ORAL
Qty: 60 TABLET | Refills: 0 | Status: SHIPPED | OUTPATIENT
Start: 2017-09-06 | End: 2017-10-09

## 2017-09-06 NOTE — TELEPHONE ENCOUNTER
Left message that the written RX is being sent to the Replaced by [facility] via .

## 2017-09-06 NOTE — TELEPHONE ENCOUNTER
norco      Last Written Prescription Date: 8/16/17  Last Fill Quantity: 60,  # refills: 0   Last Office Visit with G, P or [facility] prescribing provider: 7/6/17

## 2017-09-21 ENCOUNTER — HOSPITAL ENCOUNTER (INPATIENT)
Facility: HOSPITAL | Age: 72
LOS: 3 days | Discharge: HOME | DRG: 871 | End: 2017-09-24
Attending: EMERGENCY MEDICINE | Admitting: INTERNAL MEDICINE
Payer: COMMERCIAL

## 2017-09-21 DIAGNOSIS — J18.9 PNEUMONIA OF RIGHT LOWER LOBE DUE TO INFECTIOUS ORGANISM: ICD-10-CM

## 2017-09-21 DIAGNOSIS — A41.9 SEPSIS, DUE TO UNSPECIFIED ORGANISM: Primary | ICD-10-CM

## 2017-09-21 LAB
ALBUMIN SERPL-MCNC: 3.6 G/DL (ref 3.4–5)
ALBUMIN UR-MCNC: NEGATIVE MG/DL
ALP SERPL-CCNC: 128 U/L (ref 40–150)
ALT SERPL W P-5'-P-CCNC: 40 U/L (ref 0–70)
ANION GAP SERPL CALCULATED.3IONS-SCNC: 9 MMOL/L (ref 3–14)
APPEARANCE UR: CLEAR
AST SERPL W P-5'-P-CCNC: 26 U/L (ref 0–45)
BACTERIA #/AREA URNS HPF: ABNORMAL /HPF
BACTERIA SPEC CULT: ABNORMAL
BACTERIA SPEC CULT: ABNORMAL
BACTERIA SPEC CULT: NORMAL
BASOPHILS # BLD AUTO: 0 10E9/L (ref 0–0.2)
BASOPHILS NFR BLD AUTO: 0 %
BILIRUB SERPL-MCNC: 0.4 MG/DL (ref 0.2–1.3)
BILIRUB UR QL STRIP: NEGATIVE
BUN SERPL-MCNC: 19 MG/DL (ref 7–30)
CALCIUM SERPL-MCNC: 10.1 MG/DL (ref 8.5–10.1)
CHLORIDE SERPL-SCNC: 104 MMOL/L (ref 94–109)
CO2 SERPL-SCNC: 28 MMOL/L (ref 20–32)
COLOR UR AUTO: YELLOW
CREAT SERPL-MCNC: 1.21 MG/DL (ref 0.66–1.25)
DIFFERENTIAL METHOD BLD: ABNORMAL
EOSINOPHIL # BLD AUTO: 0 10E9/L (ref 0–0.7)
EOSINOPHIL NFR BLD AUTO: 0.4 %
ERYTHROCYTE [DISTWIDTH] IN BLOOD BY AUTOMATED COUNT: 15 % (ref 10–15)
FLUAV+FLUBV RNA SPEC QL NAA+PROBE: NEGATIVE
FLUAV+FLUBV RNA SPEC QL NAA+PROBE: NEGATIVE
GFR SERPL CREATININE-BSD FRML MDRD: 59 ML/MIN/1.7M2
GLUCOSE SERPL-MCNC: 126 MG/DL (ref 70–99)
GLUCOSE UR STRIP-MCNC: NEGATIVE MG/DL
GRAM STN SPEC: ABNORMAL
HCT VFR BLD AUTO: 40.4 % (ref 40–53)
HGB BLD-MCNC: 14 G/DL (ref 13.3–17.7)
HGB UR QL STRIP: NEGATIVE
HYALINE CASTS #/AREA URNS LPF: 1 /LPF
IMM GRANULOCYTES # BLD: 0 10E9/L (ref 0–0.4)
IMM GRANULOCYTES NFR BLD: 0.4 %
KETONES UR STRIP-MCNC: NEGATIVE MG/DL
LACTATE SERPL-SCNC: 2.3 MMOL/L (ref 0.4–2)
LACTATE SERPL-SCNC: 2.5 MMOL/L (ref 0.4–2)
LACTATE SERPL-SCNC: 3.4 MMOL/L (ref 0.4–2)
LACTATE SERPL-SCNC: 3.4 MMOL/L (ref 0.4–2)
LACTATE SERPL-SCNC: 3.9 MMOL/L (ref 0.4–2)
LACTATE SERPL-SCNC: 4.6 MMOL/L (ref 0.4–2)
LEUKOCYTE ESTERASE UR QL STRIP: ABNORMAL
LYMPHOCYTES # BLD AUTO: 0.3 10E9/L (ref 0.8–5.3)
LYMPHOCYTES NFR BLD AUTO: 9.9 %
MCH RBC QN AUTO: 29.7 PG (ref 26.5–33)
MCHC RBC AUTO-ENTMCNC: 34.7 G/DL (ref 31.5–36.5)
MCV RBC AUTO: 86 FL (ref 78–100)
MONOCYTES # BLD AUTO: 0 10E9/L (ref 0–1.3)
MONOCYTES NFR BLD AUTO: 1.1 %
MUCOUS THREADS #/AREA URNS LPF: PRESENT /LPF
NEUTROPHILS # BLD AUTO: 2.4 10E9/L (ref 1.6–8.3)
NEUTROPHILS NFR BLD AUTO: 88.2 %
NITRATE UR QL: NEGATIVE
NRBC # BLD AUTO: 0 10*3/UL
NRBC BLD AUTO-RTO: 0 /100
PH UR STRIP: 5.5 PH (ref 4.7–8)
PLATELET # BLD AUTO: 197 10E9/L (ref 150–450)
POTASSIUM SERPL-SCNC: 3.6 MMOL/L (ref 3.4–5.3)
PROCALCITONIN SERPL-MCNC: 20.08 NG/ML
PROT SERPL-MCNC: 7.5 G/DL (ref 6.8–8.8)
RBC # BLD AUTO: 4.71 10E12/L (ref 4.4–5.9)
RBC #/AREA URNS AUTO: <1 /HPF (ref 0–2)
RSV RNA SPEC NAA+PROBE: NEGATIVE
SODIUM SERPL-SCNC: 141 MMOL/L (ref 133–144)
SOURCE: ABNORMAL
SP GR UR STRIP: 1.01 (ref 1–1.03)
SPECIMEN SOURCE: ABNORMAL
SPECIMEN SOURCE: ABNORMAL
SPECIMEN SOURCE: NORMAL
TROPONIN I SERPL-MCNC: <0.015 UG/L (ref 0–0.04)
UROBILINOGEN UR STRIP-MCNC: NORMAL MG/DL (ref 0–2)
WBC # BLD AUTO: 2.7 10E9/L (ref 4–11)
WBC #/AREA URNS AUTO: 1 /HPF (ref 0–2)

## 2017-09-21 PROCEDURE — 25000125 ZZHC RX 250: Performed by: INTERNAL MEDICINE

## 2017-09-21 PROCEDURE — 94640 AIRWAY INHALATION TREATMENT: CPT | Mod: 76

## 2017-09-21 PROCEDURE — 99285 EMERGENCY DEPT VISIT HI MDM: CPT | Performed by: EMERGENCY MEDICINE

## 2017-09-21 PROCEDURE — 85025 COMPLETE CBC W/AUTO DIFF WBC: CPT | Performed by: EMERGENCY MEDICINE

## 2017-09-21 PROCEDURE — 93010 ELECTROCARDIOGRAM REPORT: CPT | Performed by: INTERNAL MEDICINE

## 2017-09-21 PROCEDURE — 83605 ASSAY OF LACTIC ACID: CPT | Performed by: STUDENT IN AN ORGANIZED HEALTH CARE EDUCATION/TRAINING PROGRAM

## 2017-09-21 PROCEDURE — 87631 RESP VIRUS 3-5 TARGETS: CPT | Performed by: EMERGENCY MEDICINE

## 2017-09-21 PROCEDURE — 93005 ELECTROCARDIOGRAM TRACING: CPT

## 2017-09-21 PROCEDURE — 71020 ZZHC CHEST TWO VIEWS, FRONT/LAT: CPT | Mod: TC

## 2017-09-21 PROCEDURE — 25000128 H RX IP 250 OP 636: Performed by: INTERNAL MEDICINE

## 2017-09-21 PROCEDURE — 36415 COLL VENOUS BLD VENIPUNCTURE: CPT | Performed by: INTERNAL MEDICINE

## 2017-09-21 PROCEDURE — 94640 AIRWAY INHALATION TREATMENT: CPT

## 2017-09-21 PROCEDURE — 36415 COLL VENOUS BLD VENIPUNCTURE: CPT | Performed by: STUDENT IN AN ORGANIZED HEALTH CARE EDUCATION/TRAINING PROGRAM

## 2017-09-21 PROCEDURE — 25000132 ZZH RX MED GY IP 250 OP 250 PS 637

## 2017-09-21 PROCEDURE — 84145 PROCALCITONIN (PCT): CPT | Performed by: INTERNAL MEDICINE

## 2017-09-21 PROCEDURE — 80053 COMPREHEN METABOLIC PANEL: CPT | Performed by: EMERGENCY MEDICINE

## 2017-09-21 PROCEDURE — 20000003 ZZH R&B ICU

## 2017-09-21 PROCEDURE — 40000275 ZZH STATISTIC RCP TIME EA 10 MIN

## 2017-09-21 PROCEDURE — 83605 ASSAY OF LACTIC ACID: CPT | Performed by: INTERNAL MEDICINE

## 2017-09-21 PROCEDURE — 25000128 H RX IP 250 OP 636: Performed by: EMERGENCY MEDICINE

## 2017-09-21 PROCEDURE — 36415 COLL VENOUS BLD VENIPUNCTURE: CPT | Performed by: EMERGENCY MEDICINE

## 2017-09-21 PROCEDURE — 81001 URINALYSIS AUTO W/SCOPE: CPT | Performed by: EMERGENCY MEDICINE

## 2017-09-21 PROCEDURE — 83605 ASSAY OF LACTIC ACID: CPT | Performed by: EMERGENCY MEDICINE

## 2017-09-21 PROCEDURE — 87040 BLOOD CULTURE FOR BACTERIA: CPT | Performed by: EMERGENCY MEDICINE

## 2017-09-21 PROCEDURE — 99223 1ST HOSP IP/OBS HIGH 75: CPT | Mod: AI | Performed by: INTERNAL MEDICINE

## 2017-09-21 PROCEDURE — 25000132 ZZH RX MED GY IP 250 OP 250 PS 637: Performed by: EMERGENCY MEDICINE

## 2017-09-21 PROCEDURE — 40000786 ZZHCL STATISTIC ACTIVE MRSA SURVEILLANCE CULTURE: Performed by: STUDENT IN AN ORGANIZED HEALTH CARE EDUCATION/TRAINING PROGRAM

## 2017-09-21 PROCEDURE — 96365 THER/PROPH/DIAG IV INF INIT: CPT

## 2017-09-21 PROCEDURE — 84484 ASSAY OF TROPONIN QUANT: CPT | Performed by: EMERGENCY MEDICINE

## 2017-09-21 PROCEDURE — 99285 EMERGENCY DEPT VISIT HI MDM: CPT | Mod: 25

## 2017-09-21 PROCEDURE — 87040 BLOOD CULTURE FOR BACTERIA: CPT | Performed by: INTERNAL MEDICINE

## 2017-09-21 PROCEDURE — 25000132 ZZH RX MED GY IP 250 OP 250 PS 637: Performed by: INTERNAL MEDICINE

## 2017-09-21 PROCEDURE — 96361 HYDRATE IV INFUSION ADD-ON: CPT

## 2017-09-21 PROCEDURE — 87205 SMEAR GRAM STAIN: CPT | Performed by: INTERNAL MEDICINE

## 2017-09-21 RX ORDER — NALOXONE HYDROCHLORIDE 0.4 MG/ML
.1-.4 INJECTION, SOLUTION INTRAMUSCULAR; INTRAVENOUS; SUBCUTANEOUS
Status: DISCONTINUED | OUTPATIENT
Start: 2017-09-21 | End: 2017-09-24 | Stop reason: HOSPADM

## 2017-09-21 RX ORDER — MORPHINE SULFATE 2 MG/ML
1-2 INJECTION, SOLUTION INTRAMUSCULAR; INTRAVENOUS
Status: DISCONTINUED | OUTPATIENT
Start: 2017-09-21 | End: 2017-09-21

## 2017-09-21 RX ORDER — ONDANSETRON 2 MG/ML
4 INJECTION INTRAMUSCULAR; INTRAVENOUS EVERY 6 HOURS PRN
Status: DISCONTINUED | OUTPATIENT
Start: 2017-09-21 | End: 2017-09-24 | Stop reason: HOSPADM

## 2017-09-21 RX ORDER — CEFTRIAXONE SODIUM 1 G/50ML
1 INJECTION, SOLUTION INTRAVENOUS EVERY 24 HOURS
Status: DISCONTINUED | OUTPATIENT
Start: 2017-09-21 | End: 2017-09-24

## 2017-09-21 RX ORDER — SODIUM CHLORIDE 9 MG/ML
INJECTION, SOLUTION INTRAVENOUS CONTINUOUS
Status: DISCONTINUED | OUTPATIENT
Start: 2017-09-21 | End: 2017-09-21

## 2017-09-21 RX ORDER — SODIUM CHLORIDE, SODIUM LACTATE, POTASSIUM CHLORIDE, CALCIUM CHLORIDE 600; 310; 30; 20 MG/100ML; MG/100ML; MG/100ML; MG/100ML
INJECTION, SOLUTION INTRAVENOUS CONTINUOUS
Status: DISCONTINUED | OUTPATIENT
Start: 2017-09-21 | End: 2017-09-24

## 2017-09-21 RX ORDER — HYDROCODONE BITARTRATE AND ACETAMINOPHEN 5; 325 MG/1; MG/1
1-2 TABLET ORAL EVERY 4 HOURS PRN
Status: DISCONTINUED | OUTPATIENT
Start: 2017-09-21 | End: 2017-09-24 | Stop reason: HOSPADM

## 2017-09-21 RX ORDER — ACETAMINOPHEN 325 MG/1
650 TABLET ORAL EVERY 4 HOURS PRN
Status: DISCONTINUED | OUTPATIENT
Start: 2017-09-21 | End: 2017-09-24 | Stop reason: HOSPADM

## 2017-09-21 RX ORDER — ACETAMINOPHEN 325 MG/1
650 TABLET ORAL ONCE
Status: COMPLETED | OUTPATIENT
Start: 2017-09-21 | End: 2017-09-21

## 2017-09-21 RX ORDER — IPRATROPIUM BROMIDE AND ALBUTEROL SULFATE 2.5; .5 MG/3ML; MG/3ML
3 SOLUTION RESPIRATORY (INHALATION) EVERY 4 HOURS PRN
Status: DISCONTINUED | OUTPATIENT
Start: 2017-09-21 | End: 2017-09-24 | Stop reason: HOSPADM

## 2017-09-21 RX ORDER — AZITHROMYCIN 250 MG/1
500 TABLET, FILM COATED ORAL ONCE
Status: COMPLETED | OUTPATIENT
Start: 2017-09-21 | End: 2017-09-21

## 2017-09-21 RX ORDER — MORPHINE SULFATE 2 MG/ML
2-4 INJECTION, SOLUTION INTRAMUSCULAR; INTRAVENOUS
Status: DISCONTINUED | OUTPATIENT
Start: 2017-09-21 | End: 2017-09-24 | Stop reason: HOSPADM

## 2017-09-21 RX ORDER — ACETAMINOPHEN 325 MG/1
975 TABLET ORAL ONCE
Status: COMPLETED | OUTPATIENT
Start: 2017-09-21 | End: 2017-09-21

## 2017-09-21 RX ORDER — ONDANSETRON 4 MG/1
4 TABLET, ORALLY DISINTEGRATING ORAL EVERY 6 HOURS PRN
Status: DISCONTINUED | OUTPATIENT
Start: 2017-09-21 | End: 2017-09-24 | Stop reason: HOSPADM

## 2017-09-21 RX ORDER — LORAZEPAM 2 MG/ML
.5-1 INJECTION INTRAMUSCULAR EVERY 4 HOURS PRN
Status: DISCONTINUED | OUTPATIENT
Start: 2017-09-21 | End: 2017-09-24 | Stop reason: HOSPADM

## 2017-09-21 RX ORDER — CEFTRIAXONE SODIUM 1 G/50ML
1 INJECTION, SOLUTION INTRAVENOUS ONCE
Status: COMPLETED | OUTPATIENT
Start: 2017-09-21 | End: 2017-09-21

## 2017-09-21 RX ORDER — ACETAMINOPHEN 325 MG/1
TABLET ORAL
Status: COMPLETED
Start: 2017-09-21 | End: 2017-09-21

## 2017-09-21 RX ADMIN — ENOXAPARIN SODIUM 40 MG: 40 INJECTION SUBCUTANEOUS at 07:49

## 2017-09-21 RX ADMIN — HYDROCODONE BITARTRATE AND ACETAMINOPHEN 2 TABLET: 5; 325 TABLET ORAL at 12:48

## 2017-09-21 RX ADMIN — HYDROCODONE BITARTRATE AND ACETAMINOPHEN 2 TABLET: 5; 325 TABLET ORAL at 17:02

## 2017-09-21 RX ADMIN — ACETAMINOPHEN 650 MG: 325 TABLET ORAL at 05:31

## 2017-09-21 RX ADMIN — SODIUM CHLORIDE, POTASSIUM CHLORIDE, SODIUM LACTATE AND CALCIUM CHLORIDE 1000 ML: 600; 310; 30; 20 INJECTION, SOLUTION INTRAVENOUS at 03:27

## 2017-09-21 RX ADMIN — IPRATROPIUM BROMIDE AND ALBUTEROL SULFATE 3 ML: .5; 3 SOLUTION RESPIRATORY (INHALATION) at 16:25

## 2017-09-21 RX ADMIN — ACETAMINOPHEN 975 MG: 325 TABLET, FILM COATED ORAL at 01:05

## 2017-09-21 RX ADMIN — CEFTRIAXONE SODIUM 1 G: 1 INJECTION, SOLUTION INTRAVENOUS at 07:45

## 2017-09-21 RX ADMIN — SODIUM CHLORIDE 1000 ML: 9 INJECTION, SOLUTION INTRAVENOUS at 04:13

## 2017-09-21 RX ADMIN — MORPHINE SULFATE 1 MG: 2 INJECTION, SOLUTION INTRAMUSCULAR; INTRAVENOUS at 11:19

## 2017-09-21 RX ADMIN — SODIUM CHLORIDE 1000 ML: 9 INJECTION, SOLUTION INTRAVENOUS at 05:01

## 2017-09-21 RX ADMIN — AZITHROMYCIN MONOHYDRATE 500 MG: 500 INJECTION, POWDER, LYOPHILIZED, FOR SOLUTION INTRAVENOUS at 08:26

## 2017-09-21 RX ADMIN — PHENYLEPHRINE HYDROCHLORIDE 3 MCG/KG/MIN: 10 INJECTION INTRAVENOUS at 08:25

## 2017-09-21 RX ADMIN — CEFTRIAXONE SODIUM 1 G: 1 INJECTION, SOLUTION INTRAVENOUS at 04:16

## 2017-09-21 RX ADMIN — SODIUM CHLORIDE 1000 ML: 9 INJECTION, SOLUTION INTRAVENOUS at 02:55

## 2017-09-21 RX ADMIN — IPRATROPIUM BROMIDE AND ALBUTEROL SULFATE 3 ML: .5; 3 SOLUTION RESPIRATORY (INHALATION) at 11:44

## 2017-09-21 RX ADMIN — SODIUM CHLORIDE 1000 ML: 9 INJECTION, SOLUTION INTRAVENOUS at 01:25

## 2017-09-21 RX ADMIN — SODIUM CHLORIDE, POTASSIUM CHLORIDE, SODIUM LACTATE AND CALCIUM CHLORIDE: 600; 310; 30; 20 INJECTION, SOLUTION INTRAVENOUS at 07:34

## 2017-09-21 RX ADMIN — IPRATROPIUM BROMIDE AND ALBUTEROL SULFATE 3 ML: .5; 3 SOLUTION RESPIRATORY (INHALATION) at 07:41

## 2017-09-21 RX ADMIN — IPRATROPIUM BROMIDE AND ALBUTEROL SULFATE 3 ML: .5; 3 SOLUTION RESPIRATORY (INHALATION) at 20:28

## 2017-09-21 RX ADMIN — HYDROCODONE BITARTRATE AND ACETAMINOPHEN 2 TABLET: 5; 325 TABLET ORAL at 20:15

## 2017-09-21 RX ADMIN — SODIUM CHLORIDE 1000 ML: 9 INJECTION, SOLUTION INTRAVENOUS at 02:26

## 2017-09-21 RX ADMIN — PHENYLEPHRINE HYDROCHLORIDE 0.5 MCG/KG/MIN: 10 INJECTION INTRAVENOUS at 04:10

## 2017-09-21 RX ADMIN — ONDANSETRON 4 MG: 2 INJECTION, SOLUTION INTRAMUSCULAR; INTRAVENOUS at 07:21

## 2017-09-21 RX ADMIN — PHENYLEPHRINE HYDROCHLORIDE 2 MCG/KG/MIN: 10 INJECTION INTRAVENOUS at 11:07

## 2017-09-21 RX ADMIN — AZITHROMYCIN 500 MG: 250 TABLET, FILM COATED ORAL at 04:13

## 2017-09-21 RX ADMIN — SODIUM CHLORIDE: 9 INJECTION, SOLUTION INTRAVENOUS at 01:06

## 2017-09-21 RX ADMIN — ACETAMINOPHEN 650 MG: 325 TABLET, FILM COATED ORAL at 08:56

## 2017-09-21 RX ADMIN — ACETAMINOPHEN 650 MG: 325 TABLET, FILM COATED ORAL at 05:31

## 2017-09-21 ASSESSMENT — PAIN DESCRIPTION - DESCRIPTORS
DESCRIPTORS: ACHING

## 2017-09-21 ASSESSMENT — ENCOUNTER SYMPTOMS
CHILLS: 1
FEVER: 1
COUGH: 1

## 2017-09-21 NOTE — ED NOTES
SIGNIFICANT LAB VALUE    DATE: 9/21/2017    TIME OF RECEIPT FROM LAB: 0120    Lactic Acid   Date Value Ref Range Status   09/21/2017 3.9 (H) 0.4 - 2.0 mmol/L Final     Comment:     Significant value called to and read back by  NATTY GARCIA AT 0120 ON 9/21/17 BY WAL         RESULTS GIVEN WITH READ BACK TO (PROVIDER): Chavo Mcpherson MD    TIME LAB VALUE REPORTED TO PROVIDER: 0120

## 2017-09-21 NOTE — ED NOTES
Patient comes in with wife. Patient reported at around 2200 he had some left sided chest wall pains similar to pains he had back in June when he had pneumonia then around 2300 he started to get the shivers and chills.  On arrival patient is shaking from the chills.  Fever. Cool to the touch, IV with labs and BC drawn.  Patient is alert and ornt. Denies any chest pains at this time. Reports pains in his back from shivering.

## 2017-09-21 NOTE — ED NOTES
DATE:  9/21/2017   TIME OF RECEIPT FROM LAB:  0453  LAB TEST:  Lactic Acid  LAB VALUE:  3.4  RESULTS GIVEN WITH READ-BACK TO (PROVIDER):    TIME LAB VALUE REPORTED TO PROVIDER:   0457

## 2017-09-21 NOTE — PLAN OF CARE
Problem: Sepsis/Septic Shock (Adult)  Goal: Signs and Symptoms of Listed Potential Problems Will be Absent, Minimized or Managed (Sepsis/Septic Shock)  Signs and symptoms of listed potential problems will be absent, minimized or managed by discharge/transition of care (reference Sepsis/Septic Shock (Adult) CPG).   Outcome: No Change    09/21/17 1714   Sepsis/Septic Shock   Problems Assessed (Sepsis) situational response      Patient alert/oriented, c/o back pain, prn oral norco used with effect, prn IV morphine given with minimal effect. Having elevated temperature. BP's being monitored clementina drip titrated accordingly, IFV infusing. IV ABX. Given. o2 sats mid to low 90's on 1 LPM at this time. Lactic acid being monitored. Up in chair, not tolerating bed, c/o increasing back pain in bed. Up to commode, voiding in urinal. Bowel sounds hypoactive, having nausea this AM prn IV Zofran given with effect. Tolerating clear liquid diet.     Problem: Pneumonia (Adult)  Goal: Signs and Symptoms of Listed Potential Problems Will be Absent, Minimized or Managed (Pneumonia)  Signs and symptoms of listed potential problems will be absent, minimized or managed by discharge/transition of care (reference Pneumonia (Adult) CPG).   Outcome: No Change    09/21/17 1714   Pneumonia   Problems Assessed (Pneumonia) fluid/electrolyte imbalance;respiratory compromise

## 2017-09-21 NOTE — ED NOTES
updated on BP.  2nd IV to be started and additional NS bolus. Patient denies any vertigo, just reports he feels tired

## 2017-09-21 NOTE — PROGRESS NOTES
Pt. Received 3 neb treatments-weaned by RN to 1 lpm-95% SPO2-basilar crackles with expiratory wheezes heard after tx

## 2017-09-21 NOTE — IP AVS SNAPSHOT
MRN:[id number]                      After Visit Summary   9/21/2017    Dann Espinoza    MRN: [id number]           Thank you!     Thank you for choosing [facility] for your care. Our goal is always to provide you with excellent care. Hearing back from our patients is one way we can continue to improve our services. Please take a few minutes to complete the written survey that you may receive in the mail after you visit with us. Thank you!        Patient Information     Date Of Birth          1945        Designated Caregiver       Most Recent Value    Caregiver    Will someone help with your care after discharge? yes    Name of designated caregiver wife, see facesheet    Phone number of caregiver see facesheet    Caregiver address see facesheet      About your hospital stay     You were admitted on:  September 21, 2017 You last received care in the:  [facility]    You were discharged on:  September 24, 2017        Reason for your hospital stay       CAP, Sepsis                  Who to Call     For medical emergencies, please call 911.  For non-urgent questions about your medical care, please call your primary care provider or clinic, [phone]          Attending Provider     Provider Specialty    Chavo Mcpherson MD Emergency Medicine    Rojas Rebollar MD Internal Medicine    Mansoor Fallon MD Internal Medicine    Kirill Richmond DO Internal Medicine       Primary Care Provider Office Phone # Fax #    R Navneet Dey [phone]      After Care Instructions     Activity       Your activity upon discharge: activity as tolerated            Diet       Follow this diet upon discharge: Orders Placed This Encounter      Advance Diet as Tolerated: Regular Diet Adult                  Follow-up Appointments     Follow-up and recommended labs and tests        1)  Dr ANA Dey this week.  Follow up Pneumonia and rash                  Your next 10 appointments  "already scheduled     Sep 29, 2017  9:45 AM CDT   (Arrive by 9:30 AM)   Office Visit with WASHINGTON Dey MD   [facility] ([facility] )    [place] [address]   [phone]           Bring a current list of meds and any records pertaining to this visit.  For Physicals, please bring immunization records and any forms needing to be filled out.  Please arrive 15 minutes early to complete paperwork and register.              Pending Results     Date and Time Order Name Status Description    2017 0700 Blood culture In process     2017 0113 Blood culture Preliminary     2017 0048 Blood culture Preliminary             Statement of Approval     Ordered          17 1051  I have reviewed and agree with all the recommendations and orders detailed in this document.  EFFECTIVE NOW     Approved and electronically signed by:  Kirill Richmond DO             Admission Information     Date & Time Provider Department Dept. Phone    2017 Kirill Richmond,  [facility] [phone]      Your Vitals Were     Blood Pressure Temperature Respirations Height Weight Pulse Oximetry    127/63 98.3  F (36.8  C) (Tympanic) 18 1.778 m (5' 10\") 105.6 kg (232 lb 12.9 oz) 95%    BMI (Body Mass Index)                   33.4 kg/m2           MyChart Information     [employer] lets you send messages to your doctor, view your test results, renew your prescriptions, schedule appointments and more. To sign up, go to [email or web address] . Click on \"Log in\" on the left side of the screen, which will take you to the Welcome page. Then click on \"Sign up Now\" on the right side of the page.     You will be asked to enter the access code listed below, as well as some personal information. Please follow the directions to create your username and password.     Your access code is: [email or web address]  Expires: 2017  9:41 PM     Your access code will  in 90 " days. If you need help or a new code, please call your [facility] clinic or [phone].        Care EveryWhere ID     This is your Care EveryWhere ID. This could be used by other organizations to access your [facility] medical records  [id number]        Equal Access to Services     JAVIER BARRIOS : Hadii aad ku hadevertonlance Sojoseali, waaxda luqadaha, qaybta kaalmada adeegyada, fallon geenicolecarol polo. So [facility] [phone].    ATENCIÓN: Si habla español, tiene a elder disposición servicios gratuitos de asistencia lingüística. Llame al [phone].    We comply with applicable federal civil rights laws and [place] laws. We do not discriminate on the basis of race, color, national origin, age, disability sex, sexual orientation or gender identity.               Review of your medicines      START taking        Dose / Directions    amoxicillin-clavulanate 875-125 MG per tablet   Commonly known as:  AUGMENTIN   Indication:  Community Acquired Pneumonia        Dose:  1 tablet   Take 1 tablet by mouth 2 times daily (with meals)   Quantity:  14 tablet   Refills:  0         CONTINUE these medicines which have NOT CHANGED        Dose / Directions    albuterol 108 (90 BASE) MCG/ACT Inhaler   Commonly known as:  PROAIR HFA/PROVENTIL HFA/VENTOLIN HFA        Dose:  2 puff   Inhale 2 puffs into the lungs every 6 hours as needed for shortness of breath / dyspnea or wheezing   Quantity:  1 Inhaler   Refills:  0       allopurinol 300 MG tablet   Commonly known as:  ZYLOPRIM        Dose:  0.5 tablet   Take 0.5 tablets by mouth daily.   Refills:  0       aspirin 81 MG EC tablet   Generic drug:  aspirin        Dose:  1 tablet   Take 1 tablet by mouth daily.   Refills:  0       ATORVASTATIN CALCIUM PO        Dose:  40 mg   Take 40 mg by mouth daily   Refills:  0       HYDROcodone-acetaminophen 5-325 MG per tablet   Commonly known as:  NORCO   Used for:  Hip pain, left        TAKE ONE TABLET BY MOUTH EVRY 6 HOURS AS NEEDED FOR  MODERATE TO SEVERE PAIN   Quantity:  60 tablet   Refills:  0       Vitamin D3 2000 UNITS Tabs        Dose:  1 tablet   Take 1 tablet by mouth daily.   Refills:  0            Where to get your medicines      These medications were sent to [facility] - [place], [place] - [address]   [address]  [facility] [address]     Phone:  [phone]     amoxicillin-clavulanate 875-125 MG per tablet               ANTIBIOTIC INSTRUCTION     You've Been Prescribed an Antibiotic - Now What?  Your healthcare team thinks that you or your loved one might have an infection. Some infections can be treated with antibiotics, which are powerful, life-saving drugs. Like all medications, antibiotics have side effects and should only be used when necessary. There are some important things you should know about your antibiotic treatment.      Your healthcare team may run tests before you start taking an antibiotic.    Your team may take samples (e.g., from your blood, urine or other areas) to run tests to look for bacteria. These test can be important to determine if you need an antibiotic at all and, if you do, which antibiotic will work best.      Within a few days, your healthcare team might change or even stop your antibiotic.    Your team may start you on an antibiotic while they are working to find out what is making you sick.    Your team might change your antibiotic because test results show that a different antibiotic would be better to treat your infection.    In some cases, once your team has more information, they learn that you do not need an antibiotic at all. They may find out that you don't have an infection, or that the antibiotic you're taking won't work against your infection. For example, an infection caused by a virus can't be treated with antibiotics. Staying on an antibiotic when you don't need it is more likely to be harmful than helpful.      You may experience side effects from your  antibiotic.    Like all medications, antibiotics have side effects. Some of these can be serious.    Let you healthcare team know if you have any known allergies when you are admitted to the hospital.    One significant side effect of nearly all antibiotics is the risk of severe and sometimes deadly diarrhea caused by Clostridium difficile (C. Difficile). This occurs when a person takes antibiotics because some good germs are destroyed. Antibiotic use allows C. diificile to take over, putting patients at high risk for this serious infection.    As a patient or caregiver, it is important to understand your or your loved one's antibiotic treatment. It is especially important for caregivers to speak up when patients can't speak for themselves. Here are some important questions to ask your healthcare team.    What infection is this antibiotic treating and how do you know I have that infection?    What side effects might occur from this antibiotic?    How long will I need to take this antibiotic?    Is it safe to take this antibiotic with other medications or supplements (e.g., vitamins) that I am taking?     Are there any special directions I need to know about taking this antibiotic? For example, should I take it with food?    How will I be monitored to know whether my infection is responding to the antibiotic?    What tests may help to make sure the right antibiotic is prescribed for me?      Information provided by:  www.cdc.gov/getsmart  U.S. Department of Health and Human Services  Centers for disease Control and Prevention  National Center for Emerging and Zoonotic Infectious Diseases  Division of Healthcare Quality Promotion         Protect others around you: Learn how to safely use, store and throw away your medicines at www.disposemymeds.org.             Medication List: This is a list of all your medications and when to take them. Check marks below indicate your daily home schedule. Keep this list as a  reference.      Medications           Morning Afternoon Evening Bedtime As Needed    albuterol 108 (90 BASE) MCG/ACT Inhaler   Commonly known as:  PROAIR HFA/PROVENTIL HFA/VENTOLIN HFA   Inhale 2 puffs into the lungs every 6 hours as needed for shortness of breath / dyspnea or wheezing                                allopurinol 300 MG tablet   Commonly known as:  ZYLOPRIM   Take 0.5 tablets by mouth daily.                                amoxicillin-clavulanate 875-125 MG per tablet   Commonly known as:  AUGMENTIN   Take 1 tablet by mouth 2 times daily (with meals)                                aspirin 81 MG EC tablet   Take 1 tablet by mouth daily.   Generic drug:  aspirin                                ATORVASTATIN CALCIUM PO   Take 40 mg by mouth daily                                HYDROcodone-acetaminophen 5-325 MG per tablet   Commonly known as:  NORCO   TAKE ONE TABLET BY MOUTH EVRY 6 HOURS AS NEEDED FOR MODERATE TO SEVERE PAIN   Last time this was given:  2 tablets on 9/23/2017  9:55 PM                                Vitamin D3 2000 UNITS Tabs   Take 1 tablet by mouth daily.                                          More Information        Pneumonia (Adult)  Pneumonia is an infection deep within the lungs. It is in the small air sacs (alveoli). Pneumonia may be caused by a virus or bacteria. Pneumonia caused by bacteria is usually treated with an antibiotic. Severe cases may need to be treated in the hospital. Milder cases can be treated at home. Symptoms usually start to get better during the first 2 days of treatment.    Home care  Follow these guidelines when caring for yourself at home:    Rest at home for the first 2 to 3 days, or until you feel stronger. Don t let yourself get overly tired when you go back to your activities.    Stay away from cigarette smoke - yours or other people s.    You may use acetaminophen or ibuprofen to control fever or pain, unless another medicine was prescribed. If you have  chronic liver or kidney disease, talk with your healthcare provider before using these medicines. Also talk with your provider if you ve had a stomach ulcer or gastrointestinal bleeding. Don t give aspirin to anyone younger than 18 years of age who is ill with a fever. It may cause severe liver damage.    Your appetite may be poor, so a light diet is fine.    Drink 6 to 8 glasses of fluids every day to make sure you are getting enough fluids. Beverages can include water, sport drinks, sodas without caffeine, juices, tea, or soup. Fluids will help loosen secretions in the lung. This will make it easier for you to cough up the phlegm (sputum). If you also have heart or kidney disease, check with your healthcare provider before you drink extra fluids.    Take antibiotic medicine prescribed until it is all gone, even if you are feeling better after a few days.  Follow-up care  Follow up with your healthcare provider in the next 2 to 3 days, or as advised. This is to be sure the medicine is helping you get better.  If you are 65 or older, you should get a pneumococcal vaccine and a yearly flu (influenza) shot. You should also get these vaccines if you have chronic lung disease like asthma, emphysema, or COPD. Recently, a second type of pneumonia vaccine has become available for everyone over 65 years old. This is in addition to the previous vaccine. Ask your provider about this.  When to seek medical advice  Call your healthcare provider right away if any of these occur:    You don t get better within the first 48 hours of treatment    Shortness of breath gets worse    Rapid breathing (more than 25 breaths per minute)    Coughing up blood    Chest pain gets worse with breathing    Fever of 100.4 F (38 C) or higher that doesn t get better with fever medicine    Weakness, dizziness, or fainting that gets worse    Thirst or dry mouth that gets worse    Sinus pain, headache, or a stiff neck    Chest pain not caused by  coughing  Date Last Reviewed: 1/1/2017 2000-2017 [employer]. [address] [place] [address], [place] [address]. All rights reserved. This information is not intended as a substitute for professional medical care. Always follow your healthcare professional's instructions.                Sepsis     To treat sepsis, antibiotics and fluids may by given through an intravenous (IV) line.     Sepsis happens when your body responds with widespread inflammation to a bad infection or bacteremia--the presence of bacteria in your bloodstream. Sepsis can be deadly. Blood pressure may drop and the lungs and kidneys may start to fail. Emergency care for sepsis is crucial.  Risk factors  Those most at risk for sepsis are:    Infants or older adults    People who have an illness that weakens their immune system, such as cancer, AIDS, or diabetes    People being treated with chemotherapy medicines or radiation, which weakens the immune system    People who have had a transplant    People with a very severe infection such as pneumonia, meningitis, or a urinary tract infection  When to go to the emergency department (ED)  Sepsis is an emergency. Go to the nearest ED if you have a fever with any of these symptoms:    Chills and shaking    Rapid heartbeat and breathing    Trouble breathing    Severe nausea or uncontrolled vomiting    Confusion, disorientation, drowsiness, or dizziness    Decreased urination    Severe pain, including in the back or joints   What to expect in the ED    Blood and urine tests are done to look for bacteria. They also check for organ failure.    Blood, urine, or sputum cultures may be taken. The samples are sent to a lab. They are placed in a special container. Any bacteria should grow in 24 hours.    X-rays or other imaging tests may be done.  A person with sepsis will be admitted to the hospital and treated with antibiotics. Treatment may also include oxygen and intravenous fluids.  Date Last  Reviewed: 10/1/2016    6547-8595 [employer]. [address] [place] [address], [place] [address]. All rights reserved. This information is not intended as a substitute for professional medical care. Always follow your healthcare professional's instructions.                Understanding Sepsis  Sepsis is a severe response the body has to an infection. It is most often caused by bacteria. It is also known as septicemia, or systemic inflammatory response syndrome (SIRS). Sepsis is a medical emergency. It needs to be treated right away.  What is sepsis?  Sepsis is when the body reacts to an infection with a severe inflammatory response. It can be caused by bacteria, fungus, or a virus. Sepsis can cause many kinds of problems around the body. It can lead severe low blood pressure (shock) and organ failure. This can lead to death if not treated.  Sepsis is most common in:    Infants and older adults    People with an infection such as pneumonia, meningitis, or a urinary tract infection    People who have an illness such as cancer, AIDS, or diabetes    People being treated with chemotherapy medications or radiation    People who have had a transplant  Symptoms of sepsis  Symptoms of sepsis can include:    Chills and shaking    Rapid heartbeat    Rapid breathing    Shortness of breath    Severe nausea or uncontrolled vomiting    Confusion    Dizziness    Decreased urination    Severe pain, including in the back or joints   Diagnosing sepsis  If your health care provider thinks you may have sepsis, you will be given tests. You may have blood and urine tests. These are done to look for bacteria, viruses, or fungus. You may also have X-rays or other imaging tests. These may be done to look at your organs to locate the source of infection.  Treating sepsis  If you have sepsis, your health care provider will give you antibiotics through a thin, flexible tube put into a vein in your arm (IV). You will also be given fluids  through the IV. You may also be given nutrition or other medications through your IV. Your health care provider will talk with you about other treatments you may need. These may include using an oxygen mask or a ventilator to help with breathing. Treatment may last at least 7 to 10 days. Sepsis must be treated in the hospital.  Date Last Reviewed: 7/15/2015    2397-7593 [employer]. [address] [place] [address], [place] [address]. All rights reserved. This information is not intended as a substitute for professional medical care. Always follow your healthcare professional's instructions.                Treating Pneumonia  Pneumonia is an infection of one or both of the lungs. Pneumonia:    Is usually caused by either a virus or a bacteria    Can be very serious, especially in infants, young children, and older adults. It s also serious for those with other long-term health problems or weakened immune systems.    Is sometimes treated at home and sometimes in the hospital    Antibiotic medicines  Antibiotics may be prescribed for pneumonia caused by bacteria. They may be pills (oral medicines), or shots (injections). Or they may be given by IV (intravenously) into a vein. If you are taking oral medicines at home:    Fill your prescription and start taking your medicine as soon as you can.    You will likely start to feel better in a day or 2, but don t stop taking the antibiotic.    Use a pill organizer to help you remember to take your medicine.    Let your healthcare provider know if you have side effects.    Take your medicine exactly as directed on the label. Talk to your provider or pharmacist if you have any questions.  Antiviral medicines  Antiviral medicine may be prescribed for pneumonia caused by a virus. For example, antiviral medicine may be prescribed for pneumonia caused by the flu virus. Antibiotics do not work against viruses. If you are taking antiviral medicine at home:    Fill your  prescription and start taking your medicine as soon as you can.    Talk with your provider or pharmacist about possible side effects.    Take the medicine exactly as instructed.  To relieve symptoms  There are many medicines that can help relieve symptoms of pneumonia. Some are prescription and some are over-the-counter.  Your healthcare provider may recommend:    Acetaminophen or ibuprofen to lower your fever and to lessen headache or other pain    Cough medicine to loosen mucus or to reduce coughing  Make sure you check with your healthcare provider or pharmacist before taking any over-the-counter medicines.  Special treatments  If you are hospitalized for pneumonia, you may have other therapies, including:    Inhaled medicines to help with breathing or chest congestion    Supplemental oxygen to increase low oxygen levels  Drink fluids and eat healthy  You should eat healthy to help your body fight the infection. Drinking a lot of fluids helps to replace fluids lost from fever and to loosen mucus in your chest.    Diet. Make healthy food choices, including fruits and vegetables, lean meats and other proteins, 100% whole grain and low- or no-fat dairy products.    Fluids. Drink at least 6 to 8 tall glasses a day. Water and 100% fruit or vegetable juice are best.  Get plenty of rest and sleep  You may be more tired than usual for a while. It is important to get enough sleep at night. It s also important to rest during the day. Talk with your healthcare provider if coughing or other symptoms are interfering with your sleep.  Preventing the spread of germs  The best thing you can do to prevent spreading germs is to wash your hands often. You should:    Rub your hand with soap and water for 20 to 30 seconds.    Clean in between your fingers, the backs of your hands, and around your nails.    Dry your hands on a separate towel or use paper towels.  You should also:    Keep alcohol-based hand  nearby.    Make  sure you also clean surfaces that you touch. Use a product that kills all types of germs.    Stay away from others until you are feeling better.  When to call your healthcare provider  Call your healthcare provider if you have any of the following:    Symptoms get worse    Fever continues    Shortness of breath gets worse    Increased mucus or mucus that is darker in color    Coughing gets worse    Lips or fingers are bluish in color    Side effects from your medicine   Date Last Reviewed: 12/1/2016 2000-2017 [employer]. [address] [place] [address], [place] [address]. All rights reserved. This information is not intended as a substitute for professional medical care. Always follow your healthcare professional's instructions.                What is Pneumonia?    Pneumonia is a serious lung infection. Many cases of pneumonia are caused by bacteria or viruses. Fungi may also cause pneumonia, but this is less common.  You may also get pneumonia after another illness, such as a cold, flu, or bronchitis. Those most at risk include older adults, smokers, and people with long-term (chronic) health problems or weak immune systems.  Healthy lungs    Air travels in and out of the lungs through tubes called airways.    The tubes branch into smaller passages called bronchioles. These end in tiny sacs called alveoli.    Blood vessels surrounding the alveoli take oxygen into the bloodstream. At the same time, the alveoli remove carbon dioxide (a waste gas) from the blood. The carbon dioxide is then exhaled.  When you have pneumonia    Pneumonia causes the bronchioles and the alveoli to fill with excess mucus and become inflamed.    Your body s response may be to cough. This can help clear out the fluid.    The fluid (or mucus) you cough up may appear green or dark yellow.    The excess mucus may make you feel short of breath.    The inflammation and infection may give you a fever.  What are the symptoms?  Symptoms of  pneumonia can come without warning. At first, you may think you have a cold or flu. But symptoms may get worse quickly, turning into pneumonia. Symptoms can be different for bacterial and viral pneumonia. Common symptoms may include the following:    Severe cough with green or yellow mucus that doesn't improve or that gets worse    Fever and chills    Nausea, vomiting or diarrhea    Shortness of breath with normal daily activities    Increased heart rate    Chest pain or discomfort when breathing in or coughing    Headache    Excessive sweating and clammy skin  Date Last Reviewed: 12/1/2016 2000-2017 [employer]. [address] [place] [address]. All rights reserved. This information is not intended as a substitute for professional medical care. Always follow your healthcare professional's instructions.                Patient Education    Amoxicillin Trihydrate, Clavulanate Potassium Chewable tablet    Amoxicillin Trihydrate, Clavulanate Potassium Oral suspension    Amoxicillin Trihydrate, Clavulanate Potassium Oral tablet    Amoxicillin Trihydrate, Clavulanate Potassium Oral tablet, extended-release  Amoxicillin Trihydrate, Clavulanate Potassium Oral tablet  What is this medicine?  AMOXICILLIN; CLAVULANIC ACID (a mox i JOHN in; JANELL dye AS id) is a penicillin antibiotic. It is used to treat certain kinds of bacterial infections. It will not work for colds, flu, or other viral infections.  This medicine may be used for other purposes; ask your health care provider or pharmacist if you have questions.  What should I tell my health care provider before I take this medicine?  They need to know if you have any of these conditions:    bowel disease, like colitis    kidney disease    liver disease    mononucleosis    an unusual or allergic reaction to amoxicillin, penicillin, cephalosporin, other antibiotics, clavulanic acid, other medicines, foods, dyes, or preservatives    pregnant or  trying to get pregnant    breast-feeding  How should I use this medicine?  Take this medicine by mouth with a full glass of water. Follow the directions on the prescription label. Take at the start of a meal. Do not crush or chew. If the tablet has a score line, you may cut it in half at the score line for easier swallowing. Take your medicine at regular intervals. Do not take your medicine more often than directed. Take all of your medicine as directed even if you think you are better. Do not skip doses or stop your medicine early.  Talk to your pediatrician regarding the use of this medicine in children. Special care may be needed.  Overdosage: If you think you have taken too much of this medicine contact a poison control center or emergency room at once.  NOTE: This medicine is only for you. Do not share this medicine with others.  What if I miss a dose?  If you miss a dose, take it as soon as you can. If it is almost time for your next dose, take only that dose. Do not take double or extra doses.  What may interact with this medicine?    allopurinol    anticoagulants    birth control pills    methotrexate    probenecid  This list may not describe all possible interactions. Give your health care provider a list of all the medicines, herbs, non-prescription drugs, or dietary supplements you use. Also tell them if you smoke, drink alcohol, or use illegal drugs. Some items may interact with your medicine.  What should I watch for while using this medicine?  Tell your doctor or health care professional if your symptoms do not improve.  Do not treat diarrhea with over the counter products. Contact your doctor if you have diarrhea that lasts more than 2 days or if it is severe and watery.  If you have diabetes, you may get a false-positive result for sugar in your urine. Check with your doctor or health care professional.  Birth control pills may not work properly while you are taking this medicine. Talk to your doctor  about using an extra method of birth control.  What side effects may I notice from receiving this medicine?  Side effects that you should report to your doctor or health care professional as soon as possible:    allergic reactions like skin rash, itching or hives, swelling of the face, lips, or tongue    breathing problems    dark urine    fever or chills, sore throat    redness, blistering, peeling or loosening of the skin, including inside the mouth    seizures    trouble passing urine or change in the amount of urine    unusual bleeding, bruising    unusually weak or tired    white patches or sores in the mouth or throat  Side effects that usually do not require medical attention (report to your doctor or health care professional if they continue or are bothersome):    diarrhea    dizziness    headache    nausea, vomiting    stomach upset    vaginal or anal irritation  This list may not describe all possible side effects. Call your doctor for medical advice about side effects. You may report side effects to FDA at 3-474-FDA-0217.  Where should I keep my medicine?  Keep out of the reach of children.  Store at room temperature below 25 degrees C (77 degrees F). Keep container tightly closed. Throw away any unused medicine after the expiration date.  NOTE:This sheet is a summary. It may not cover all possible information. If you have questions about this medicine, talk to your doctor, pharmacist, or health care provider. Copyright  2016 Gold Standard

## 2017-09-21 NOTE — H&P
CHIEF COMPLAINT:  Left-sided chest wall pains and chills.      HISTORY OF PRESENT ILLNESS:  Mr. Dann Espinoza is a 71-year-old gentleman who reports that he was in his normal state of health until the last week or so with really no complaints whatsoever and then around 10:00 last evening he just started to develop some, describes left-sided chest wall pains, a little pleuritic sounding in nature, similar to back in June when he had a pneumonia.  He started to get shaking chills and rigors and fever at home and came into the ER for evaluation.  He was somewhat short of breath, not really coughing at all.  No abdominal pain, no diarrhea, no initial nausea or vomiting or headache.  No palpitations or syncope.  He has been eating good, drinking adequately, voiding without trouble.  His bowels, usually he does not go for maybe sometimes 2-3 days but really nothing new or out of the ordinary that has been going on.  No travel at all.  No exposures to anyone else who has been ill as far as he knows.      When he arrived initially in the ER, he had a temperature of 100 degrees, his heart rate was 100, respiratory rate was 18.  His pressures were 99 systolic.  His room air sats were 90.  Over the ensuing several hours his temperature went up to 102.2.  His pressures were down in the 70s systolic range.  He did require some O2 supplementation, dropped down to 88% and was put on a couple of liters of O2.  From looking at the ER reports in the ER I think he started to get a significant amount of fluid.  He has gotten actually a total of 8 liters of IV fluid prior to his coming up to the ICU and eventually was started on some phenylephrine down in the emergency room with pressures coming up to in the 100 range.      The patient had some nausea while he was up here, some mildly increasing shortness of breath.  Has had some back pain while he has been up here.  Otherwise, he started to feel perhaps a little bit better but still  feels pretty miserable.  As I said, he has had some nausea.  A little bit of emesis.  No current abdominal pain.  No peripheral edema at all.  Appetite has been fine up until just this evening.  This came on just very suddenly.      PAST MEDICAL HISTORY:  Significant for:   1.  History of asbestosis apparently.  [facility]  He had PFTs done back on 2015.  His FEV1 was 2.66, FVC of 33 and 81% FEV1.  DLCO was 82.  No obvious obstruction whatsoever.  Perhaps some mild restriction noted.   2.  History of some gout.   3.  History of hyperlipidemia.   4.  History of echocardiogram performed back on 2016.  This showed the left ventricle was slightly enlarged, EF was 55%.  He had normal atrium, normal right ventricle, trace mitral regurgitation and aortic sclerosis without stenosis, but trace aortic insufficiency.  He had trace tricuspid regurgitation, trace pulmonic valve insufficiency, unable to estimate RV systolic pressure.   5.  Status post hemorrhoidectomy.   6.  He had a colonoscopy in , apparently unremarkable findings.   7.  Episode of pneumonia in 2017, had a right lower lobe infiltrate and was treated with Levaquin, had developed a rash and so was given a dose of Rocephin and azithromycin and this completely resolved.      ALLERGIES:  Levaquin.        SOCIAL HISTORY:  He was a former smoker, quit many, many years ago.  Nondrinker.  He is retired from the mines.  .      FAMILY HISTORY:  Positive for his mom had a stroke and some heart disease.  Father had Crohn's disease and  of liver cancer.  A couple brothers and sisters with coronary artery disease.      MEDICATIONS:   1.  He is on hydrocodone/APAP one every 6 hours p.r.n. pain.   2.  Albuterol metered dose inhaler p.r.n. wheezing.   3.  Lipitor 40 mg daily.     4.  Allopurinol 150 mg daily.   5.  Aspirin 81 mg daily.   6.  Vitamin D3 at 2000 units daily.      REVIEW OF SYSTEMS:  Pertinent positives and  negatives noted above in the HPI.      PHYSICAL EXAMINATION:     GENERAL:  Reveals an alert elderly gentleman in mild to moderate amount of distress.   VITAL SIGNS:  Current up in our ICU are a last temperature of 101.2, heart rates around 100, respiratory rate is 20.  Last blood pressure is 110/48 for a mean arterial pressure of 68.  His sats were 96% on 3 liters of O2.  His weight is 104.2 kg, bed scale.   HEENT:  Normocephalic, atraumatic.  Pupils are equal, round, react to light.  Extraocular movements are intact.  Sclerae are clear.   NECK:  Supple, no obvious lymphadenopathy or thyromegaly.  Mucous membranes are mildly dry.   LUNGS:  He has some mild diffuse expiratory wheezing bilaterally.  His right posterior lower lobe does have much more rhonchi and crackles.   CARDIAC:  Somewhat distant heart tones but mildly tachycardic and regular.  No audible murmurs, rubs or gallops.  Neck veins are very difficult to assess given his very thick neck.  Pulses are 2+ and equal.   ABDOMEN:  Obese, soft, mildly distended, mildly tympanitic, nontender, bowel sounds are positive.  No obvious masses or rebound.   EXTREMITIES:  Without any cyanosis, clubbing or edema.   NEUROLOGIC:  He is alert and oriented x3.  Cranial nerves are intact, nonfocal examination.      IMAGING:  His chest x-ray is relatively unremarkable, maybe there is a little bit of increasing lung markings in the right lower lobe.  Nothing really specific.      LABORATORY DATA:  Shows a sodium of 141, potassium 3.6, chloride 104, CO2 is 28, BUN is 19, creatinine is 1.21, glucose was 126, calcium is 10.1, anion gap 9, albumin 3.6, total protein 7.5, bilirubin 0.4, alkaline phosphatase 128, ALT 40, ALT 26, lactic acid was 3.9.  Troponin less than 0.015.  White count was 2700, hemoglobin 14, platelet count was 197,000.  His urinalysis shows a specific gravity of 1.012, trace LET, 1 white and 1 red.  No bacteria, 1 hyaline cast.  Influenza is pending.  Apparently  blood cultures were not done in the ER.  We will have to try and get them here.  We are attempting to try and get a sputum sample.  I did do a procalcitonin level which was quite high at 20.08.      ASSESSMENT:   1.  Sepsis due to a presumed right lower lobe pneumonia, community-acquired.  The patient presents with acute onset of shaking chills, fevers, pleuritic pain, came into the ER hypotensive, mildly hypoxic.  Everything certainly points toward this being a pneumonia.  Unfortunately, blood cultures were not done in the ER.  We will try and get them now.  He has been started on Rocephin and azithromycin.  This is appropriate.  Will give him some nebs and O2 supplementation as he seems to be doing fine in terms of that.  His lactic acid has come down with the fluids.  He has received over 8 liters of fluid and did require pressors, was started on Donavon-Synephrine down in the ER, not my first choice as a pressor.  Will continue with it currently up here.  His current blood pressures are a little above 100 systolic.  We will cut back on his IV fluids.  He will be monitored very closely in our ICU with strict I's and O's, titrating his oxygen, titrating his pressures as needed.  Procalcitonin elevated just once again confirms this is probably a bacterial pneumonia.   2.  Renal status.  Normal BUN, creatinine and no previous issues.  Will monitor his I's and O's closely given the large amount of IV fluids he has received, over 8 liters.     3.  Lactic acidosis.  It has already come down to 2.3.  We will continue to follow this.     The patient has no real prior cardiac history, normal echocardiogram done within the last year and a half.  No signs of any ischemia at this point.  History of some asbestosis exposure.  PFTs done a year and a half ago are relatively normal also with the exception of some mild restriction.      Anticipate hospital stay is going to be greater than or equal to 2 midnights.         RORY  MD CECI             D: 2017 08:04   T: 2017 09:04   MT: SHARYN      Name:     JHONATHAN WASHINGTON   MRN:      [id number]        Account:      [id number]   :      1945           Admitted:     [id number]      Document: [id number]       cc: WASHINGTON Dey MD

## 2017-09-21 NOTE — PHARMACY
[facility]    Pharmacy      Antimicrobial Stewardship Note     Current antimicrobial therapy:  Anti-infectives (Future)    Start     Dose/Rate Route Frequency Ordered Stop    09/21/17 0715  cefTRIAXone in d5w (ROCEPHIN) intermittent infusion 1 g      1 g  over 30 Minutes Intravenous EVERY 24 HOURS 09/21/17 0714 09/21/17 0715  azithromycin (ZITHROMAX) 500 mg in NaCl 0.9 % 250 mL intermittent infusion      500 mg  over 1 Hours Intravenous EVERY 24 HOURS 09/21/17 0714            Indication: CAP     Pertinent labs:  Creatinine   Creatinine   Date Value Ref Range Status   09/21/2017 1.21 0.66 - 1.25 mg/dL Final   06/07/2017 1.09 0.66 - 1.25 mg/dL Final   06/02/2017 1.14 0.70 - 1.20 mg/dL Final     WBC   WBC   Date Value Ref Range Status   09/21/2017 2.7 (L) 4.0 - 11.0 10e9/L Final   06/07/2017 9.3 4.0 - 11.0 10e9/L Final   10/06/2016 5.4 4.0 - 11.0 10e9/L Final     ANC No components found for: ANC     Culture Results: pending     Recommendations/Interventions:  1. None at this time.    Xuan Paz, [facility]  September 21, 2017

## 2017-09-21 NOTE — PROVIDER NOTIFICATION
SIGNIFICANT LAB VALUE    DATE: 9/21/2017    TIME OF RECEIPT FROM LAB: 0618    Lactic Acid   Date Value Ref Range Status   09/21/2017 2.3 (H) 0.4 - 2.0 mmol/L Final     Comment:     Significant value called to and read back by  RUBIN ON 9/21/17 @ 0618 BY [facility]         RESULTS GIVEN WITH READ BACK TO (PROVIDER): MD Rebollar    TIME LAB VALUE REPORTED TO PROVIDER: 0619

## 2017-09-21 NOTE — PLAN OF CARE
Assessment completed see flow sheet.    Dann is laying in bed, his PCP is Dr Navneet Dey and Tutu Harris at the [facility] clinic, he goes to [facility] in [place] and goes [facility] in [place]. He uses [facility] in [facility]. He is a  and the [facility] has been notified of this hospital stay. The only medical equipment he has at home is a high toilet with grab bars.     Dann lives at home with his wife Anny and a dog, he states he feels safe and that his home is well maintained. He performs his own self cares, manages his medication and appointment schedules. He has had a fall in the last 6 months when he slipped on rocks of a riverbank without injury. He drives and has reliable transportation. He does not work.     He has difficulty sleeping due to back pain. He has no mood concerns. His ismael is moderately important to him, he does not want ismael support at this time. He does not smoke or consume alcohol. His support system is his wife and daughter, his best friend  in a home fire in the last couple of months. He denies stressors. He enjoys spending time with his family.     His plan is to discharge to home, his wife will provide transportation. No other needs identified. CM will remain available for discharge planning.    LOC: alert    Others present: Patient    Dx: pneumonia    Lives with: Lives With: spouse (dog)    Living at: Living Arrangements: house    Equipment used:  None    Support System: Description of Support System: Supportive, Involved    Primary PCP: WASHINGTON Dey    POA/Guardian: No    Health Care Directive: NO    Pharmacy: [facility]    :  no    Homecare/County Services:   no    Adequate Resources for needs (housing, utilities, food/med): YES    Meds and appointments management: YES    Work: NO    Transportation: YES     ADLs: independent    Falls: Yes  Reason for falls risk: Other- slipped on rocks down a riverbank    Able to Return to Prior Living  Arrangements: YES    : YES    Goals: to go home    Barriers: none    Needs: Demonstrates Competency    BRANDIN: None    ED Visits: 1

## 2017-09-21 NOTE — PLAN OF CARE
[facility] Inpatient Admission Note:    Patient admitted to 3124/3124-1 at approximately 0520 via cart accompanied by spouse and nurse from emergency room . Report received from RAD Quevedo in SBAR format at 0520 via face to face in room. Patient transferred to bed via slide board.. Patient is alert and oriented X 3, reports pain; rates at 5 on 0-10 scale.  Patient oriented to room, unit, hourly rounding, and plan of care. Explained admission packet and patient handbook with patient bill of rights brochure. Will continue to monitor and document as needed.     Inpatient Nursing criteria listed below was met:      Health care directives status obtained and documented: Yes      Care Everywhere authorization obtained No      MRSA swab completed for patient 65 years and older: Yes      Patient identifies a surrogate decision maker: Yes If yes, who: wife and daughter Contact Information: see facesheet      Core Measure diagnosis present:Yes. If yes, state diagnosis: Sepsis       If initial lactic acid >2.0, repeat lactic acid drawn within one hour of arrival to unit: Yes. If no, state reason: NA      Vaccination assessment and education completed: Yes   Vaccinations received prior to admission: Pneumovax no  Influenza(seasonal)  NO   Vaccination(s) ordered: influenza vaccine, pneumococcal vaccine      Clergy visit ordered if patient requests: N/A      Skin issues/needs documented: No      Isolation Patient: no Education given, correct sign in place and documentation row added to PCS:  NA      Fall Prevention Yes: Care plan updated, education given and documented, sticker and magnet in place: Yes      Care Plan initiated: Yes      Education Documented (including assessment): Yes      Patient has discharge needs : No If yes, please explain: NA

## 2017-09-21 NOTE — PLAN OF CARE
DATE:  9/21/2017   TIME OF RECEIPT FROM LAB:  0750  LAB TEST:  Procalcitonin  LAB VALUE:  20.08  RESULTS GIVEN WITH READ-BACK TO (PROVIDER):  Rojas Rebollar MD  TIME LAB VALUE REPORTED TO PROVIDER:   0753

## 2017-09-21 NOTE — PROVIDER NOTIFICATION
DATE:  9/21/2017   TIME OF RECEIPT FROM LAB:  1150  LAB TEST:  Lactic acid  LAB VALUE:  3.4  RESULTS GIVEN WITH READ-BACK TO (PROVIDER):  Dr. Bowles  TIME LAB VALUE REPORTED TO PROVIDER:   7427

## 2017-09-21 NOTE — PLAN OF CARE
Problem: Patient Care Overview  Goal: Plan of Care/Patient Progress Review  Outcome: No Change    09/21/17 0630   OTHER   Plan Of Care Reviewed With patient   Plan of Care Review   Progress no change   Pt admitted to ICU with IV normal saline bolus infusing, and IV phenylephrine gtt 1.5 mcg/kg/min.  Phenylephrine gtt titrated up to 3 mcg/kg/min, with BP's 90's-100's/50's-60's, MAP 60's-80's.  Temp 101 F, PO tylenol 650 mg given x 1.  Flushed face.  SPO2 93% on 2 LPM NC.  Lungs diminished to auscultation.  Emesis x 1, MD notified.  No void.  Chronic back pain 5/10, repositioned.      Problem: Sepsis/Septic Shock (Adult)  Goal: Signs and Symptoms of Listed Potential Problems Will be Absent, Minimized or Managed (Sepsis/Septic Shock)  Signs and symptoms of listed potential problems will be absent, minimized or managed by discharge/transition of care (reference Sepsis/Septic Shock (Adult) CPG).   Outcome: No Change    09/21/17 0630   Sepsis/Septic Shock   Problems Assessed (Sepsis) all   Problems Present (Sepsis) hypoperfusion/hemodynamic instability;hypoxia/hypoxemia;situational response         Face to face report given with opportunity to observe patient.    Report given to RAD L[place].    Steff Li   9/21/2017  7:14 AM

## 2017-09-21 NOTE — PROVIDER NOTIFICATION
0902 Notified dr. Cantrell of patient having lowered BP's, medication titrated this shift from 2.5 to 3.5 on clementina, having back pain prn tylenol given with little effect. Patient demanding to be in chair refusing to be in bed, patient alert/oriented, ambulated to chair SBA with gait belt. MD was in room to see patient at approx 0920

## 2017-09-21 NOTE — ED NOTES
Provider updated on BP  Provider at bedside  Encouraged patient to remain on his back and pillow placed under his legs.  Patient reports he doesn't like to lay flat d/t his chronic back pains. Lungs clear, no cough or increased sob noted

## 2017-09-21 NOTE — PLAN OF CARE
Skin assessed head to toe with pts permission during monthly skin rounds. No pressure areas noted.

## 2017-09-21 NOTE — PROVIDER NOTIFICATION
Notified dr. Rebollar of patient having increased back pain, elevated heart rate, tremors, MD into see patient, awaiting orders.

## 2017-09-21 NOTE — PROVIDER NOTIFICATION
Notified dr. Rebollar of patient only voiding 100 mL this shift, bladder scanned for 116 mL, no new orders will continue to monitor.

## 2017-09-21 NOTE — IP AVS SNAPSHOT
[facility]    [address] [place] [id number]    Phone:  [phone]    Fax:  [phone]                                       After Visit Summary   9/21/2017    Dann Espinoza    MRN: [id number]           After Visit Summary Signature Page     I have received my discharge instructions, and my questions have been answered. I have discussed any challenges I see with this plan with the nurse or doctor.    ..........................................................................................................................................  Patient/Patient Representative Signature      ..........................................................................................................................................  Patient Representative Print Name and Relationship to Patient    ..................................................               ................................................  Date                                            Time    ..........................................................................................................................................  Reviewed by Signature/Title    ...................................................              ..............................................  Date                                                            Time

## 2017-09-21 NOTE — PLAN OF CARE
Face to face report given with opportunity to observe patient.  Report given to RAD Junior.    Yeison Álvarez  9/21/2017, 6:57 PM

## 2017-09-21 NOTE — ED PROVIDER NOTES
History     Chief Complaint   Patient presents with     Cough     Fever     HPI  Dann Espinoza is a 71 year old male who presents to the emergency department accompanied by the spouse.  Patient has been experiencing fever alternating with chills since this evening.  He is also coughing but the cough is dry.  He suspects that he has pneumonia in the left side of his lung.  In June this year he was treated for pneumonia on the right side of the lung.  He takes atorvastatin for hypercholesterolemia.  Denies any other chronic medical problems.  Patient denies feeling short of breath, chest pain, nausea, vomiting, palpitations, diarrhea.  Denies any blood in the sputum.    I have reviewed the Medications, Allergies, Past Medical and Surgical History, and Social History in the Epic system.    Allergies:   Allergies   Allergen Reactions     Levaquin [Levofloxacin] Rash         No current facility-administered medications on file prior to encounter.   Current Outpatient Prescriptions on File Prior to Encounter:  HYDROcodone-acetaminophen (NORCO) 5-325 MG per tablet TAKE ONE TABLET BY MOUTH EVRY 6 HOURS AS NEEDED FOR MODERATE TO SEVERE PAIN   albuterol (PROAIR HFA/PROVENTIL HFA/VENTOLIN HFA) 108 (90 BASE) MCG/ACT Inhaler Inhale 2 puffs into the lungs every 6 hours as needed for shortness of breath / dyspnea or wheezing   ATORVASTATIN CALCIUM PO Take 40 mg by mouth daily   allopurinol (ZYLOPRIM) 300 MG tablet Take 0.5 tablets by mouth daily.   aspirin (ASPIR-81) 81 MG EC tablet Take 1 tablet by mouth daily.   Cholecalciferol (VITAMIN D3) 2000 UNITS TABS Take 1 tablet by mouth daily.       Patient Active Problem List   Diagnosis     ACP (advance care planning)     Lumbago     Asbestosis (H)     Gout     Hyperlipidemia     Decreased hearing     Dizziness     ASNHL (asymmetrical sensorineural hearing loss)     Impacted cerumen     Aortic root dilation (H)     Duane's syndrome of left eye     Constipation     Coxitis      "Elevated prostate specific antigen (PSA)     Vitreous floaters     Vitreous hemorrhage (H)     Vitreous degeneration     Osteoarthritis of hip     Pneumonia of right lower lobe due to infectious organism (H)     Sepsis, due to unspecified organism (H)     Pneumonia       Past Surgical History:   Procedure Laterality Date     COLONOSCOPY  07-    Rectal Bleeding > Repeat 3 years (2011)     excision of skin cancer       HEMORRHOIDECTOMY         Social History   Substance Use Topics     Smoking status: Former Smoker     Smokeless tobacco: Never Used     Alcohol use No       Most Recent Immunizations   Administered Date(s) Administered     None   Pended Date(s) Pended     Influenza (High Dose) 3 valent vaccine 09/22/2017     Pneumococcal 23 valent 09/22/2017       BMI: Estimated body mass index is 32.96 kg/(m^2) as calculated from the following:    Height as of this encounter: 1.778 m (5' 10\").    Weight as of this encounter: 104.2 kg (229 lb 11.5 oz).      Review of Systems   Constitutional: Positive for chills and fever.   Respiratory: Positive for cough.    All other systems reviewed and are negative.      Physical Exam   Heart Rate: 101  Temp: 100  F (37.8  C)  Resp: 18  Height: 177.8 cm (5' 10\")  Weight: 96.2 kg (212 lb)  SpO2: (!) 90 %  Physical Exam   Constitutional: He is oriented to person, place, and time. He appears well-developed and well-nourished. No distress.   shaking   HENT:   Head: Atraumatic.   Mouth/Throat: Oropharynx is clear and moist. No oropharyngeal exudate.   Eyes: Pupils are equal, round, and reactive to light. No scleral icterus.   Cardiovascular: Normal rate, regular rhythm, normal heart sounds and intact distal pulses.    Pulmonary/Chest: Breath sounds normal. No respiratory distress.   Abdominal: Soft. Bowel sounds are normal. There is no tenderness.   Musculoskeletal: He exhibits no edema or tenderness.   Neurological: He is alert and oriented to person, place, and time.   Skin: " Skin is warm. No rash noted. He is not diaphoretic.   Nursing note and vitals reviewed.      ED Course   Patient examined and laboratory tests ordered.  Started on IV fluid hydration following sepsis protocol.  He was unable to maintain a normal blood pressure and therefore started on Donavon-Synephrine after at least 4 L of normal saline.  Started on IV Zithromax and Rocephin for sepsis.  Chest x-ray showed right lower lobe consolidation.    ED Course     Procedures             EKG Interpretation:      Interpreted by Chavo Mcpherson  Time reviewed: 12:30 AM  Symptoms at time of EKG: fever   Rhythm: normal sinus   Rate: normal  Axis: normal  Ectopy: none  Conduction: normal  ST Segments/ T Waves: No ST-T wave changes  Q Waves: none  Comparison to prior: No old EKG available    Clinical Impression: normal EKG              Labs Ordered and Resulted from Time of ED Arrival Up to the Time of Departure from the ED   CBC WITH PLATELETS DIFFERENTIAL - Abnormal; Notable for the following:        Result Value    WBC 2.7 (*)     Absolute Lymphocytes 0.3 (*)     All other components within normal limits   COMPREHENSIVE METABOLIC PANEL - Abnormal; Notable for the following:     Glucose 126 (*)     GFR Estimate 59 (*)     All other components within normal limits   LACTIC ACID - Abnormal; Notable for the following:     Lactic Acid 3.9 (*)     All other components within normal limits   UA MACROSCOPIC WITH REFLEX TO MICRO AND CULTURE - Abnormal; Notable for the following:     Leukocyte Esterase Urine Trace (*)     Bacteria Urine None (*)     Mucous Urine Present (*)     Hyaline Casts 1 (*)     All other components within normal limits   LACTIC ACID - Abnormal; Notable for the following:     Lactic Acid 3.4 (*)     All other components within normal limits   TROPONIN I   PERIPHERAL IV CATHETER   VITAL SIGNS       Assessments & Plan (with Medical Decision Making)     The right lower lobe consolidation with sepsis: Patient treated with  aggressive IV fluid hydration and Donavon-Synephrine for inotropic support.  Please see ED course above.  Started on IV antibiotics and admitted into ICU under the hospitalist on call.  Please see admission orders.    I have reviewed the nursing notes.    I have reviewed the findings, diagnosis, plan and need for follow up with the patient.    Current Discharge Medication List          Final diagnoses:   Sepsis, due to unspecified organism (H)   Pneumonia of right lower lobe due to infectious organism (H)       9/21/2017   [facility]     Chavo Mcpherson MD  09/21/17 2026

## 2017-09-21 NOTE — PROVIDER NOTIFICATION
DATE:  9/21/2017   TIME OF RECEIPT FROM LAB:  1130  LAB TEST:  lactic  LAB VALUE:  4.6  RESULTS GIVEN WITH READ-BACK TO (PROVIDER):  Dr. Rebollar  TIME LAB VALUE REPORTED TO PROVIDER:   1131   Notified. MD of patient not tolerating laying in bed, BP's lower, increased back pain, will get up in chair and continue to monitor.

## 2017-09-22 LAB
ALBUMIN SERPL-MCNC: 2.4 G/DL (ref 3.4–5)
ALP SERPL-CCNC: 76 U/L (ref 40–150)
ALT SERPL W P-5'-P-CCNC: 38 U/L (ref 0–70)
ANION GAP SERPL CALCULATED.3IONS-SCNC: 6 MMOL/L (ref 3–14)
AST SERPL W P-5'-P-CCNC: 32 U/L (ref 0–45)
BACTERIA SPEC CULT: NORMAL
BILIRUB SERPL-MCNC: 0.6 MG/DL (ref 0.2–1.3)
BUN SERPL-MCNC: 23 MG/DL (ref 7–30)
CALCIUM SERPL-MCNC: 7.9 MG/DL (ref 8.5–10.1)
CHLORIDE SERPL-SCNC: 110 MMOL/L (ref 94–109)
CO2 SERPL-SCNC: 23 MMOL/L (ref 20–32)
CREAT SERPL-MCNC: 1.11 MG/DL (ref 0.66–1.25)
ERYTHROCYTE [DISTWIDTH] IN BLOOD BY AUTOMATED COUNT: 15.9 % (ref 10–15)
GFR SERPL CREATININE-BSD FRML MDRD: 65 ML/MIN/1.7M2
GLUCOSE SERPL-MCNC: 109 MG/DL (ref 70–99)
HCT VFR BLD AUTO: 32.2 % (ref 40–53)
HGB BLD-MCNC: 11.3 G/DL (ref 13.3–17.7)
LACTATE SERPL-SCNC: 1.1 MMOL/L (ref 0.4–2)
MCH RBC QN AUTO: 30.6 PG (ref 26.5–33)
MCHC RBC AUTO-ENTMCNC: 35.1 G/DL (ref 31.5–36.5)
MCV RBC AUTO: 87 FL (ref 78–100)
PLATELET # BLD AUTO: 137 10E9/L (ref 150–450)
POTASSIUM SERPL-SCNC: 4 MMOL/L (ref 3.4–5.3)
PROT SERPL-MCNC: 5.4 G/DL (ref 6.8–8.8)
RBC # BLD AUTO: 3.69 10E12/L (ref 4.4–5.9)
SODIUM SERPL-SCNC: 139 MMOL/L (ref 133–144)
SPECIMEN SOURCE: NORMAL
WBC # BLD AUTO: 10.9 10E9/L (ref 4–11)

## 2017-09-22 PROCEDURE — 83605 ASSAY OF LACTIC ACID: CPT | Performed by: INTERNAL MEDICINE

## 2017-09-22 PROCEDURE — 20000003 ZZH R&B ICU

## 2017-09-22 PROCEDURE — 25000128 H RX IP 250 OP 636: Performed by: INTERNAL MEDICINE

## 2017-09-22 PROCEDURE — 36415 COLL VENOUS BLD VENIPUNCTURE: CPT | Performed by: INTERNAL MEDICINE

## 2017-09-22 PROCEDURE — 94640 AIRWAY INHALATION TREATMENT: CPT

## 2017-09-22 PROCEDURE — 85027 COMPLETE CBC AUTOMATED: CPT | Performed by: INTERNAL MEDICINE

## 2017-09-22 PROCEDURE — 40000275 ZZH STATISTIC RCP TIME EA 10 MIN

## 2017-09-22 PROCEDURE — 25000132 ZZH RX MED GY IP 250 OP 250 PS 637: Performed by: STUDENT IN AN ORGANIZED HEALTH CARE EDUCATION/TRAINING PROGRAM

## 2017-09-22 PROCEDURE — 99233 SBSQ HOSP IP/OBS HIGH 50: CPT | Performed by: INTERNAL MEDICINE

## 2017-09-22 PROCEDURE — 80053 COMPREHEN METABOLIC PANEL: CPT | Performed by: INTERNAL MEDICINE

## 2017-09-22 PROCEDURE — 71010 ZZHC CHEST ONE VIEW: CPT | Mod: TC

## 2017-09-22 PROCEDURE — 94640 AIRWAY INHALATION TREATMENT: CPT | Mod: 76

## 2017-09-22 PROCEDURE — 25000132 ZZH RX MED GY IP 250 OP 250 PS 637: Performed by: INTERNAL MEDICINE

## 2017-09-22 PROCEDURE — 25000125 ZZHC RX 250: Performed by: INTERNAL MEDICINE

## 2017-09-22 PROCEDURE — 25000132 ZZH RX MED GY IP 250 OP 250 PS 637

## 2017-09-22 RX ORDER — IBUPROFEN 200 MG
400 TABLET ORAL ONCE
Status: COMPLETED | OUTPATIENT
Start: 2017-09-22 | End: 2017-09-22

## 2017-09-22 RX ADMIN — CEFTRIAXONE SODIUM 1 G: 1 INJECTION, SOLUTION INTRAVENOUS at 07:48

## 2017-09-22 RX ADMIN — BENZOCAINE AND MENTHOL 1 LOZENGE: 15; 3.6 LOZENGE ORAL at 00:17

## 2017-09-22 RX ADMIN — IPRATROPIUM BROMIDE AND ALBUTEROL SULFATE 3 ML: .5; 3 SOLUTION RESPIRATORY (INHALATION) at 07:07

## 2017-09-22 RX ADMIN — LORAZEPAM 0.5 MG: 2 INJECTION INTRAMUSCULAR; INTRAVENOUS at 00:15

## 2017-09-22 RX ADMIN — HYDROCODONE BITARTRATE AND ACETAMINOPHEN 2 TABLET: 5; 325 TABLET ORAL at 09:46

## 2017-09-22 RX ADMIN — IBUPROFEN 400 MG: 200 TABLET, FILM COATED ORAL at 02:54

## 2017-09-22 RX ADMIN — IPRATROPIUM BROMIDE AND ALBUTEROL SULFATE 3 ML: .5; 3 SOLUTION RESPIRATORY (INHALATION) at 00:10

## 2017-09-22 RX ADMIN — AZITHROMYCIN MONOHYDRATE 500 MG: 500 INJECTION, POWDER, LYOPHILIZED, FOR SOLUTION INTRAVENOUS at 07:48

## 2017-09-22 RX ADMIN — IPRATROPIUM BROMIDE AND ALBUTEROL SULFATE 3 ML: .5; 3 SOLUTION RESPIRATORY (INHALATION) at 15:10

## 2017-09-22 RX ADMIN — ACETAMINOPHEN 650 MG: 325 TABLET, FILM COATED ORAL at 13:32

## 2017-09-22 RX ADMIN — IPRATROPIUM BROMIDE AND ALBUTEROL SULFATE 3 ML: .5; 3 SOLUTION RESPIRATORY (INHALATION) at 20:16

## 2017-09-22 RX ADMIN — HYDROCODONE BITARTRATE AND ACETAMINOPHEN 2 TABLET: 5; 325 TABLET ORAL at 19:58

## 2017-09-22 RX ADMIN — SODIUM CHLORIDE, POTASSIUM CHLORIDE, SODIUM LACTATE AND CALCIUM CHLORIDE: 600; 310; 30; 20 INJECTION, SOLUTION INTRAVENOUS at 22:53

## 2017-09-22 RX ADMIN — ENOXAPARIN SODIUM 40 MG: 40 INJECTION SUBCUTANEOUS at 07:49

## 2017-09-22 ASSESSMENT — PAIN DESCRIPTION - DESCRIPTORS
DESCRIPTORS: DISCOMFORT
DESCRIPTORS: ACHING
DESCRIPTORS: HEADACHE
DESCRIPTORS: DISCOMFORT

## 2017-09-22 NOTE — PLAN OF CARE
Met with Jairon. Completed BEN and Pneumonia education. CM will remain available for discharge planning.

## 2017-09-22 NOTE — PROVIDER NOTIFICATION
Updated Dr. Richmond about patient's request to have IV fluids stopped or to receive lasix. Patient complaining that his legs feel swollen and his toes are numb. Patient educated that the IV fluids are running to help with his BP. Will decrease fluids to 75 cc/hr per Dr. GUSTAFSON. Also updated MD of bladder scan volume of 326 post void, will continue to monitor throughout day.

## 2017-09-22 NOTE — PLAN OF CARE
Problem: Sepsis/Septic Shock (Adult)  Goal: Signs and Symptoms of Listed Potential Problems Will be Absent, Minimized or Managed (Sepsis/Septic Shock)  Signs and symptoms of listed potential problems will be absent, minimized or managed by discharge/transition of care (reference Sepsis/Septic Shock (Adult) CPG).   Outcome: No Change  Remained in chair majority of shift. BP's stable. Highest temp started this afternoon, 101.8-refused tylenol. Temp slowly coming down naturally. Telemetry showing SR/ST with occasional PAC's. O2 on during naps d/t sat's dropping into the upper 80's on RA, currently on 1 L. [place] given x1 for complaints of back pain. Lungs coarse with occasional crackles in bases. AWAD, infrequent dry cough. Patient still needs sputum sample. IV decreased to 75 ml/hr earlier. 1 IV is patent and infusing, second IV started leaking at 1800 and was removed. Zithromax and Rocephin IV. Appetite poor-advanced to regular diet. Up to bathroom with assist of 1. Voiding adequately-bladder scanned throughout day post void. Most recent bladder scan at 1800, 98 ml. Lactic redrawn this afternoon with result of 1.1. Makes needs know.

## 2017-09-22 NOTE — PLAN OF CARE
Decrease in BP/MAP when patient was sleeping with HOB around 30 degree. HOB elevated, BP/MAP improved. Will continue to monitor.

## 2017-09-22 NOTE — PROVIDER NOTIFICATION
Dr. Richmond updated that since IV fluids were decreased to 75 ml/hr systolic BP's have been in the 90's, MAP's >60. Ok to keep fluids at 75/hr at this time.

## 2017-09-22 NOTE — PLAN OF CARE
Problem: Patient Care Overview  Goal: Plan of Care/Patient Progress Review  Outcome: No Change  BP 90's-100's/50's-60's.  SR 90's-'s.  SPO2 96% on 1 LPM NC.  Lungs are diminished with RLL coarse.  Temp up to 101.3 F; received PO Norco 2 tabs and PO ibuprofen 400 mg x 1, with cold cloth applied to forehead and neck, ice applied to axillary bi-lat.  Temp down to 100.2 F.  Pt resting in recliner.  Pt bladder scanned 700 cc, output 450 cc, pt has prostate issues.      Problem: Sepsis/Septic Shock (Adult)  Goal: Signs and Symptoms of Listed Potential Problems Will be Absent, Minimized or Managed (Sepsis/Septic Shock)  Signs and symptoms of listed potential problems will be absent, minimized or managed by discharge/transition of care (reference Sepsis/Septic Shock (Adult) CPG).   Outcome: Improving    09/22/17 0524   Sepsis/Septic Shock   Problems Assessed (Sepsis) all   Problems Present (Sepsis) hypoxia/hypoxemia         Problem: Pneumonia (Adult)  Goal: Signs and Symptoms of Listed Potential Problems Will be Absent, Minimized or Managed (Pneumonia)  Signs and symptoms of listed potential problems will be absent, minimized or managed by discharge/transition of care (reference Pneumonia (Adult) CPG).   Outcome: No Change    09/22/17 0524   Pneumonia   Problems Assessed (Pneumonia) all   Problems Present (Pneumonia) respiratory compromise         Face to face report given with opportunity to observe patient.    Report given to RAD Tovar.    Steff Li   9/22/2017  7:22 AM

## 2017-09-22 NOTE — PHARMACY
[facility]    Pharmacy      Antimicrobial Stewardship Note     Current antimicrobial therapy:  Anti-infectives (Future)    Start     Dose/Rate Route Frequency Ordered Stop    09/21/17 0715  cefTRIAXone in d5w (ROCEPHIN) intermittent infusion 1 g      1 g  over 30 Minutes Intravenous EVERY 24 HOURS 09/21/17 0714 09/21/17 0715  azithromycin (ZITHROMAX) 500 mg in NaCl 0.9 % 250 mL intermittent infusion      500 mg  over 1 Hours Intravenous EVERY 24 HOURS 09/21/17 0714            Indication: CAP     Pertinent labs:  Creatinine   Creatinine   Date Value Ref Range Status   09/22/2017 1.11 0.66 - 1.25 mg/dL Final   09/21/2017 1.21 0.66 - 1.25 mg/dL Final   06/07/2017 1.09 0.66 - 1.25 mg/dL Final     WBC   WBC   Date Value Ref Range Status   09/22/2017 10.9 4.0 - 11.0 10e9/L Final   09/21/2017 2.7 (L) 4.0 - 11.0 10e9/L Final   06/07/2017 9.3 4.0 - 11.0 10e9/L Final     ANC No components found for: ANC     Culture Results: pending     Recommendations/Interventions:  1. None at this time.    Xuan Paz, [facility]  September 22, 2017

## 2017-09-22 NOTE — PROGRESS NOTES
[facility]  Hospitalist Progress Note          Assessment and Plan:   1. Sepsis due to a presumed right lower lobe pneumonia, community-acquired pneumonia. He presented with acute onset of shaking chills, fevers, pleuritic pain, came into the ER and found to be hypotensive, mildly hypoxic. Blood cultures were not drawn in ED and where later drawn in the ICU.   He remainson Rocephin and azithromycin. Additional support for sepsis included elevated Lactic acid, hypotension and acute respiratory failure. This morning he is now off pressors but remains on fairly aggressive IVFs.      2. Pulmonary:  RLL CAP with associated acute respiratory failure and sepsis:  Remains on NC O2 as needed, weaning.   Nebs are also ordered.  Dual antibiotic covergae includes Ceftriaxone and Azithromycin.     3. Renal status.  Normal BUN, creatinine and no previous issues. Monitoring.       4. CV:  Hypotension upon arrive due to sepsis.  Resolved with pressors and IVFs.  Monitoring and continuing IVFs now along with full liquid diet.    5. Lactic acidosis.  Lactic acid has remained slightly elevated.  Will continue to monitor this.    6. Infectious Disease:  CAP with sepsis.  Awaiting culture results.  Antibiotic therapy as above.  Unfortunately no cultures obtained in ED.     7. 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  No molina.  Intermittent bladder scans.       8. Prophylaxis: SQ Lovenox    9. Dispo: 1-2 additional days depending on clinical course.    10. Code:  Full                Interval History:   Patient reports feeling better than upon his arrival.  He is off pressors at this time.  BP has remained stable  Denies any chest pain.  NO SOB but is on NC O2.  Low grade fevers persist.                Medications:       pneumococcal vaccine  0.5 mL Intramuscular Prior to discharge     influenza Vac Split High-Dose  0.5 mL Intramuscular Prior to discharge     enoxaparin  40 mg Subcutaneous Q24H      cefTRIAXone  1 g Intravenous Q24H     azithromycin  500 mg Intravenous Q24H                  Physical Exam:     Vitals:    17 0400 17 0500 17 0537 17 0707   BP: 91/59 98/59     BP Location:       Resp:      Temp:  100.2  F (37.9  C)     TempSrc:  Tympanic     SpO2: 92% 96%  97%   Weight:   108.7 kg (239 lb 10.2 oz)    Height:             Vital Sign Ranges  Temperature Temp  Av.7  F (38.2  C)  Min: 99.9  F (37.7  C)  Max: 101.3  F (38.5  C)   Blood pressure Systolic (24hrs), Av , Min:86 , Max:136        Diastolic (24hrs), Av, Min:45, Max:88      Pulse No Data Recorded   Respirations Resp  Av.8  Min: 9  Max: 34   Pulse oximetry SpO2  Av %  Min: 92 %  Max: 98 %         Intake/Output Summary (Last 24 hours) at 17 0753  Last data filed at 17 0517   Gross per 24 hour   Intake             4450 ml   Output              850 ml   Net             3600 ml       General:  Alert and Orientated.  NAD.  Heart:  RRR, S1 S2, No murmurs, no rubs, no gallops.  Resp: Rhonchi and crackles in both lower lobes R>L.    Abd: Soft/NT/ND/Positve BS.  Ext: Trace LE edema.  Skin:  NO rashes noted  Neuro:  No focal Neurologic deficits noted.    Peripheral IV 17 Right Upper forearm (Active)   Site Assessment [facility] 2017  4:00 AM   Line Status Infusing 2017  4:00 AM   Phlebitis Scale 0-->no symptoms 2017  4:00 AM   Infiltration Scale 0 2017  4:00 AM   Number of days:1       Peripheral IV 17 Right Hand (Active)   Site Assessment [facility] 2017  4:00 AM   Line Status Infusing 2017  4:00 AM   Phlebitis Scale 0-->no symptoms 2017  4:00 AM   Infiltration Scale 0 2017  4:00 AM   Number of days:1     No line/device             Data:     Lab Results   Component Value Date    WBC 10.9 2017    HGB 11.3 (L) 2017    HCT 32.2 (L) 2017     (L) 2017     2017    POTASSIUM 4.0 2017    CHLORIDE 110 (H)  09/22/2017    CO2 23 09/22/2017    BUN 23 09/22/2017    CR 1.11 09/22/2017     (H) 09/22/2017    TROPI <0.015 09/21/2017    AST 32 09/22/2017    ALT 38 09/22/2017    ALKPHOS 76 09/22/2017    BILITOTAL 0.6 09/22/2017    INR 0.9 06/02/2017     Lactic Acid   Date Value Ref Range Status   09/21/2017 2.5 (H) 0.4 - 2.0 mmol/L Final     Comment:     Significant value called to and read back by  FRITZ BLANCA AT 2325 ON 9/21/17 BY BORA.     09/21/2017 3.4 (H) 0.4 - 2.0 mmol/L Final     Comment:     Significant value called to and read back by  RODRIGO GALVEZ 1727 9/21/17 BY NATTY AKINS     09/21/2017 4.6 (HH) 0.4 - 2.0 mmol/L Final     Comment:     Critical Value called to and read back by  JODI ONEAL @ 1130 ON 09/21/2017 BY JANAY Richmond, DO

## 2017-09-22 NOTE — PROVIDER NOTIFICATION
MD Bowles notified of temp 101.3 F.  Pt can have [place] around 2115 again.  MD Bowles ordered to give Norco early.

## 2017-09-22 NOTE — PROVIDER NOTIFICATION
SIGNIFICANT LAB VALUE    DATE: 9/21/2017    TIME OF RECEIPT FROM LAB: 7905    Lactic Acid   Date Value Ref Range Status   09/21/2017 2.5 (H) 0.4 - 2.0 mmol/L Final     Comment:     Significant value called to and read back by  FRITZ BLANCA AT 2727 ON 9/21/17 BY BORA.         RESULTS GIVEN WITH READ BACK TO (PROVIDER): MD Bowles    TIME LAB VALUE REPORTED TO PROVIDER: 6224

## 2017-09-23 LAB
ALBUMIN SERPL-MCNC: 2.4 G/DL (ref 3.4–5)
ALP SERPL-CCNC: 73 U/L (ref 40–150)
ALT SERPL W P-5'-P-CCNC: 34 U/L (ref 0–70)
ANION GAP SERPL CALCULATED.3IONS-SCNC: 6 MMOL/L (ref 3–14)
AST SERPL W P-5'-P-CCNC: 24 U/L (ref 0–45)
BILIRUB SERPL-MCNC: 0.6 MG/DL (ref 0.2–1.3)
BUN SERPL-MCNC: 19 MG/DL (ref 7–30)
CALCIUM SERPL-MCNC: 8.4 MG/DL (ref 8.5–10.1)
CHLORIDE SERPL-SCNC: 107 MMOL/L (ref 94–109)
CO2 SERPL-SCNC: 25 MMOL/L (ref 20–32)
CREAT SERPL-MCNC: 0.91 MG/DL (ref 0.66–1.25)
ERYTHROCYTE [DISTWIDTH] IN BLOOD BY AUTOMATED COUNT: 15.6 % (ref 10–15)
GFR SERPL CREATININE-BSD FRML MDRD: 82 ML/MIN/1.7M2
GLUCOSE SERPL-MCNC: 89 MG/DL (ref 70–99)
HCT VFR BLD AUTO: 32 % (ref 40–53)
HGB BLD-MCNC: 11.1 G/DL (ref 13.3–17.7)
MCH RBC QN AUTO: 30.1 PG (ref 26.5–33)
MCHC RBC AUTO-ENTMCNC: 34.7 G/DL (ref 31.5–36.5)
MCV RBC AUTO: 87 FL (ref 78–100)
PLATELET # BLD AUTO: 157 10E9/L (ref 150–450)
POTASSIUM SERPL-SCNC: 3.9 MMOL/L (ref 3.4–5.3)
PROT SERPL-MCNC: 5.7 G/DL (ref 6.8–8.8)
RBC # BLD AUTO: 3.69 10E12/L (ref 4.4–5.9)
SODIUM SERPL-SCNC: 138 MMOL/L (ref 133–144)
WBC # BLD AUTO: 6.1 10E9/L (ref 4–11)

## 2017-09-23 PROCEDURE — 80053 COMPREHEN METABOLIC PANEL: CPT | Performed by: INTERNAL MEDICINE

## 2017-09-23 PROCEDURE — 12000000 ZZH R&B MED SURG/OB

## 2017-09-23 PROCEDURE — 25000125 ZZHC RX 250: Performed by: INTERNAL MEDICINE

## 2017-09-23 PROCEDURE — 94640 AIRWAY INHALATION TREATMENT: CPT

## 2017-09-23 PROCEDURE — 25000128 H RX IP 250 OP 636: Performed by: INTERNAL MEDICINE

## 2017-09-23 PROCEDURE — 99232 SBSQ HOSP IP/OBS MODERATE 35: CPT | Performed by: INTERNAL MEDICINE

## 2017-09-23 PROCEDURE — 36415 COLL VENOUS BLD VENIPUNCTURE: CPT | Performed by: INTERNAL MEDICINE

## 2017-09-23 PROCEDURE — 94640 AIRWAY INHALATION TREATMENT: CPT | Mod: 76

## 2017-09-23 PROCEDURE — 25000132 ZZH RX MED GY IP 250 OP 250 PS 637: Performed by: INTERNAL MEDICINE

## 2017-09-23 PROCEDURE — 40000275 ZZH STATISTIC RCP TIME EA 10 MIN

## 2017-09-23 PROCEDURE — 87040 BLOOD CULTURE FOR BACTERIA: CPT | Performed by: INTERNAL MEDICINE

## 2017-09-23 PROCEDURE — 85027 COMPLETE CBC AUTOMATED: CPT | Performed by: INTERNAL MEDICINE

## 2017-09-23 RX ADMIN — IPRATROPIUM BROMIDE AND ALBUTEROL SULFATE 3 ML: .5; 3 SOLUTION RESPIRATORY (INHALATION) at 07:05

## 2017-09-23 RX ADMIN — HYDROCODONE BITARTRATE AND ACETAMINOPHEN 1 TABLET: 5; 325 TABLET ORAL at 05:48

## 2017-09-23 RX ADMIN — AZITHROMYCIN MONOHYDRATE 500 MG: 500 INJECTION, POWDER, LYOPHILIZED, FOR SOLUTION INTRAVENOUS at 07:46

## 2017-09-23 RX ADMIN — IPRATROPIUM BROMIDE AND ALBUTEROL SULFATE 3 ML: .5; 3 SOLUTION RESPIRATORY (INHALATION) at 11:21

## 2017-09-23 RX ADMIN — ACETAMINOPHEN 650 MG: 325 TABLET, FILM COATED ORAL at 02:34

## 2017-09-23 RX ADMIN — CEFTRIAXONE SODIUM 1 G: 1 INJECTION, SOLUTION INTRAVENOUS at 07:45

## 2017-09-23 RX ADMIN — ONDANSETRON 4 MG: 2 INJECTION, SOLUTION INTRAMUSCULAR; INTRAVENOUS at 20:17

## 2017-09-23 RX ADMIN — HYDROCODONE BITARTRATE AND ACETAMINOPHEN 2 TABLET: 5; 325 TABLET ORAL at 12:30

## 2017-09-23 RX ADMIN — HYDROCODONE BITARTRATE AND ACETAMINOPHEN 2 TABLET: 5; 325 TABLET ORAL at 21:55

## 2017-09-23 RX ADMIN — ENOXAPARIN SODIUM 40 MG: 40 INJECTION SUBCUTANEOUS at 07:45

## 2017-09-23 ASSESSMENT — PAIN DESCRIPTION - DESCRIPTORS
DESCRIPTORS: ACHING
DESCRIPTORS: ACHING;CONSTANT
DESCRIPTORS: HEADACHE

## 2017-09-23 NOTE — PLAN OF CARE
Face to face report given with opportunity to observe patient.    Report given to RAD Good   9/23/2017  7:16 AM

## 2017-09-23 NOTE — PROGRESS NOTES
[facility]  Hospitalist Progress Note          Assessment and Plan:   1. Sepsis due to a presumed right lower lobe pneumonia, community-acquired pneumonia. He presented with acute onset of shaking chills, fevers, pleuritic pain, came into the ER and found to be hypotensive, mildly hypoxic. Blood cultures were not drawn in ED and where later drawn in the ICU.   He remains on Rocephin and azithromycin. Additional support for sepsis included elevated Lactic acid, hypotension and acute respiratory failure. He is currently off pressors and IVFs have been tapered.         2. Pulmonary:  RLL CAP with associated acute respiratory failure and sepsis:  Remains on NC O2 as needed-1L, weaning.   Nebs are also ordered.  Dual antibiotic covergae includes Ceftriaxone and Azithromycin.      3. Renal status.  Normal BUN, creatinine and no previous issues. Monitoring.        4. CV:  Hypotension upon arrival due to sepsis secondary from CAP.  Resolved with pressors and IVFs.  Monitoring.       5. Lactic acidosis.  Lactic acid has remained slightly elevated initially now has normalized.       6. Infectious Disease:  CAP with sepsis.  Awaiting culture results-no results to this point.  Antibiotic therapy as above.  Unfortunately no cultures obtained in ED.  Still with low grade fevers, WBC within normal range.       7. 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  No molina.  Intermittent bladder scans. Despite large residual volumes he does empty his bladder consistently.          8. Prophylaxis: SQ Lovenox     9. Dispo: 1-3 additional days depending on clinical course.     10. Code:  Full                Interval History:   Patient relatively stable overnight.  Still with low grade fevers.  BP stable.  Still requiring 1 L NC.  Complaints of neck and back pain which are chronic. Unable to sleep in the bed to to these pain and some PND.  Denies any chest pain.  No productive cough.                 Medications:       pneumococcal vaccine  0.5 mL Intramuscular Prior to discharge     influenza Vac Split High-Dose  0.5 mL Intramuscular Prior to discharge     enoxaparin  40 mg Subcutaneous Q24H     cefTRIAXone  1 g Intravenous Q24H     azithromycin  500 mg Intravenous Q24H                  Physical Exam:     Vitals:    17 0300 17 0410 17 0500 17 0600   BP: 137/76 110/94 128/73 134/79   BP Location:  Left arm     Resp:       Temp:  100.9  F (38.3  C)     TempSrc:  Tympanic     SpO2: 97% 95% 95% 96%   Weight:       Height:             Vital Sign Ranges  Temperature Temp  Av.3  F (37.9  C)  Min: 98.3  F (36.8  C)  Max: 101.8  F (38.8  C)   Blood pressure Systolic (24hrs), Av , Min:77 , Max:148        Diastolic (24hrs), Av, Min:42, Max:94      Pulse No Data Recorded   Respirations Resp  Av  Min: 17  Max: 25   Pulse oximetry SpO2  Av.7 %  Min: 91 %  Max: 97 %         Intake/Output Summary (Last 24 hours) at 17 0701  Last data filed at 17 0651   Gross per 24 hour   Intake             2510 ml   Output             2975 ml   Net             -465 ml     General:  Alert and Orientated.  NAD.  Heart:  RRR, S1 S2, No murmurs, no rubs, no gallops.  Resp: Rhonchi and crackles in both lower lobes R>L.    Abd: Soft/NT/ND/Positve BS.  Ext: Trace LE edema.  Skin:  NO rashes noted  Neuro:  No focal Neurologic deficits noted.    Peripheral IV 17 Right Upper forearm (Active)   Site Assessment [facility] 2017  4:00 AM   Line Status Infusing;Checked every 1-2 hour 2017  4:00 AM   Phlebitis Scale 0-->no symptoms 2017  4:00 AM   Infiltration Scale 0 2017  4:00 AM   Extravasation? No 2017  4:00 AM   Number of days:2     No line/device             Data:     Lab Results   Component Value Date    WBC 6.1 2017    HGB 11.1 (L) 2017    HCT 32.0 (L) 2017     2017     2017    POTASSIUM 3.9 2017    CHLORIDE 107  09/23/2017    CO2 25 09/23/2017    BUN 19 09/23/2017    CR 0.91 09/23/2017    GLC 89 09/23/2017    TROPI <0.015 09/21/2017    AST 24 09/23/2017    ALT 34 09/23/2017    ALKPHOS 73 09/23/2017    BILITOTAL 0.6 09/23/2017    INR 0.9 06/02/2017     Lactic Acid   Date Value Ref Range Status   09/22/2017 1.1 0.4 - 2.0 mmol/L Final   09/21/2017 2.5 (H) 0.4 - 2.0 mmol/L Final     Comment:     Significant value called to and read back by  FRITZ BLANCA AT 2325 ON 9/21/17 BY BORA.     09/21/2017 3.4 (H) 0.4 - 2.0 mmol/L Final     Comment:     Significant value called to and read back by  RODRIGO GALVEZ 1727 9/21/17 BY NATTY Richmond, DO

## 2017-09-23 NOTE — PLAN OF CARE
Problem: Patient Care Overview  Goal: Plan of Care/Patient Progress Review  Outcome: Improving  VSS.  Highest temp this shift 99.4.  Lungs clear except for crackles in the right base.  Taking norco tabs two for chronic neck and back pain with adequate relief.   Patient up in chair for several hours this morning, slept in the afternoon for three hours and is now up in chair again.  Ambulating to bathroom with standby assist.   States he feels full due to fluid in abdomen, has trace edema to extremities.

## 2017-09-23 NOTE — PHARMACY
[facility]    Pharmacy      Antimicrobial Stewardship Note     Current antimicrobial therapy:  Anti-infectives (Future)    Start     Dose/Rate Route Frequency Ordered Stop    09/21/17 0715  cefTRIAXone in d5w (ROCEPHIN) intermittent infusion 1 g      1 g  over 30 Minutes Intravenous EVERY 24 HOURS 09/21/17 0714 09/21/17 0715  azithromycin (ZITHROMAX) 500 mg in NaCl 0.9 % 250 mL intermittent infusion      500 mg  over 1 Hours Intravenous EVERY 24 HOURS 09/21/17 0714            Indication: CAP     Pertinent labs:  Creatinine   Creatinine   Date Value Ref Range Status   09/23/2017 0.91 0.66 - 1.25 mg/dL Final   09/22/2017 1.11 0.66 - 1.25 mg/dL Final   09/21/2017 1.21 0.66 - 1.25 mg/dL Final     WBC   WBC   Date Value Ref Range Status   09/23/2017 6.1 4.0 - 11.0 10e9/L Final   09/22/2017 10.9 4.0 - 11.0 10e9/L Final   09/21/2017 2.7 (L) 4.0 - 11.0 10e9/L Final     ANC No components found for: ANC     Culture Results:    Blood culture [[id number]] Collected: 09/23/17 0829       Order Status: Completed Specimen: Blood Updated: 09/23/17 0834       Blood culture [[id number]] Collected: 09/21/17 0037       Order Status: Completed Specimen: Blood Updated: 09/23/17 0747        Specimen Description Blood        Special Requests Right Arm        Culture Micro No growth after 2 days       Blood culture [[id number]] Collected: 09/21/17 0113       Order Status: Completed Specimen: Blood Updated: 09/23/17 0747        Specimen Description Blood Left Arm        Culture Micro No growth after 2 days       Sputum Culture Aerobic Bacterial [[id number]]        Order Status: No result Specimen: Sputum        Active MRSA Surveillance Culture [[id number]] Collected: 09/21/17 0530       Order Status: Completed Specimen: Nares from Nasopharyngeal Updated: 09/22/17 0652        Specimen Description Nares        Culture Micro No MRSA isolated       Influenza A and B and RSV PCR [[id number]] Collected: 09/21/17 0101       Order Status:  Completed Specimen: Nasopharyngeal from Nasopharyngeal Updated: 09/21/17 1713        Specimen Description Nasopharyngeal        Influenza A PCR Negative         Flu A target RNA not detected, presumed negative for Influenza A or the viral   load is below the limit of detection.              Influenza B PCR Negative         Flu B target RNA not detected, presumed negative for Influenza B or the viral   load is below the limit of detection.              Resp Syncytial Virus Negative         RSV target RNA not detected, presumed negative for Respiratory Syncitial Virus    or the viral load is below the limit of detection.   FDA approved assay performed using [employer] GeneXpert(R) real-time PCR.             Gram stain [[id number]] (Abnormal) Collected: 09/21/17 0900       Order Status: Completed Specimen: Sputum Updated: 09/21/17 0932        Specimen Description Sputum        Gram Stain >10 Squamous epithelial cells/low power field indicates oral contamination. Please   recollect.    (A)         >25 PMNs/low power field         Caled to Yeison Álvarez at 0930 on 9/21/2017 by Susan Corrales       Sputum Culture Aerobic Bacterial [[id number]] (Abnormal) Collected: 09/21/17 0900       Order Status: Completed Specimen: Sputum Updated: 09/21/17 0929        Specimen Description Sputum        Culture Micro Canceled, Test credited         >10 Squamous epithelial cells/low power field indicates oral contamination. Please   recollect.    (A)       Blood culture [[id number]]            Recommendations/Interventions:  1. No recommendations at this time      Awilda Benítez, [facility]  September 23, 2017

## 2017-09-23 NOTE — PLAN OF CARE
Problem: Goal Outcome Summary  Goal: Goal Outcome Summary  Outcome: No Change  Pt had a low grade fever throughout the night ranging from 99.6-100.9 tympanic. See eMAR. Pt on 1L NC, lung sounds had crackles in bilateral bases. Pt instructed and encouraged to use IS. Pt still needs a sputum collected. Pt remains SR with rare PVC's and occasional PAC's (rates ), trace dependent edema in lower extremities and hands. Pt with soft/nontender/round abdomen +bowel sounds. Pt up to toilet x3 with assist x1, dyspnea on exertion. Pt remainded in recliner with legs elevated for the majority of the shift. Pt able to make needs known.

## 2017-09-24 VITALS
SYSTOLIC BLOOD PRESSURE: 127 MMHG | TEMPERATURE: 98.3 F | HEIGHT: 70 IN | OXYGEN SATURATION: 95 % | RESPIRATION RATE: 18 BRPM | BODY MASS INDEX: 33.33 KG/M2 | WEIGHT: 232.81 LBS | DIASTOLIC BLOOD PRESSURE: 63 MMHG

## 2017-09-24 LAB
ALBUMIN SERPL-MCNC: 2.7 G/DL (ref 3.4–5)
ALP SERPL-CCNC: 84 U/L (ref 40–150)
ALT SERPL W P-5'-P-CCNC: 44 U/L (ref 0–70)
ANION GAP SERPL CALCULATED.3IONS-SCNC: 3 MMOL/L (ref 3–14)
AST SERPL W P-5'-P-CCNC: 31 U/L (ref 0–45)
BILIRUB SERPL-MCNC: 0.5 MG/DL (ref 0.2–1.3)
BUN SERPL-MCNC: 13 MG/DL (ref 7–30)
CALCIUM SERPL-MCNC: 9.4 MG/DL (ref 8.5–10.1)
CHLORIDE SERPL-SCNC: 105 MMOL/L (ref 94–109)
CO2 SERPL-SCNC: 32 MMOL/L (ref 20–32)
CREAT SERPL-MCNC: 0.94 MG/DL (ref 0.66–1.25)
ERYTHROCYTE [DISTWIDTH] IN BLOOD BY AUTOMATED COUNT: 15.2 % (ref 10–15)
GFR SERPL CREATININE-BSD FRML MDRD: 79 ML/MIN/1.7M2
GLUCOSE SERPL-MCNC: 84 MG/DL (ref 70–99)
HCT VFR BLD AUTO: 35.7 % (ref 40–53)
HGB BLD-MCNC: 12.5 G/DL (ref 13.3–17.7)
MCH RBC QN AUTO: 30.1 PG (ref 26.5–33)
MCHC RBC AUTO-ENTMCNC: 35 G/DL (ref 31.5–36.5)
MCV RBC AUTO: 86 FL (ref 78–100)
PLATELET # BLD AUTO: 185 10E9/L (ref 150–450)
POTASSIUM SERPL-SCNC: 4.1 MMOL/L (ref 3.4–5.3)
PROT SERPL-MCNC: 6.5 G/DL (ref 6.8–8.8)
RBC # BLD AUTO: 4.15 10E12/L (ref 4.4–5.9)
SODIUM SERPL-SCNC: 140 MMOL/L (ref 133–144)
WBC # BLD AUTO: 4.9 10E9/L (ref 4–11)

## 2017-09-24 PROCEDURE — 94640 AIRWAY INHALATION TREATMENT: CPT

## 2017-09-24 PROCEDURE — 40000275 ZZH STATISTIC RCP TIME EA 10 MIN

## 2017-09-24 PROCEDURE — 90732 PPSV23 VACC 2 YRS+ SUBQ/IM: CPT | Performed by: INTERNAL MEDICINE

## 2017-09-24 PROCEDURE — 25000132 ZZH RX MED GY IP 250 OP 250 PS 637: Performed by: INTERNAL MEDICINE

## 2017-09-24 PROCEDURE — 85027 COMPLETE CBC AUTOMATED: CPT | Performed by: STUDENT IN AN ORGANIZED HEALTH CARE EDUCATION/TRAINING PROGRAM

## 2017-09-24 PROCEDURE — 25000128 H RX IP 250 OP 636: Performed by: INTERNAL MEDICINE

## 2017-09-24 PROCEDURE — 80053 COMPREHEN METABOLIC PANEL: CPT | Performed by: STUDENT IN AN ORGANIZED HEALTH CARE EDUCATION/TRAINING PROGRAM

## 2017-09-24 PROCEDURE — 25000125 ZZHC RX 250: Performed by: INTERNAL MEDICINE

## 2017-09-24 PROCEDURE — 36415 COLL VENOUS BLD VENIPUNCTURE: CPT | Performed by: STUDENT IN AN ORGANIZED HEALTH CARE EDUCATION/TRAINING PROGRAM

## 2017-09-24 PROCEDURE — 99238 HOSP IP/OBS DSCHRG MGMT 30/<: CPT | Performed by: INTERNAL MEDICINE

## 2017-09-24 PROCEDURE — 90662 IIV NO PRSV INCREASED AG IM: CPT | Performed by: INTERNAL MEDICINE

## 2017-09-24 RX ORDER — FUROSEMIDE 20 MG
20 TABLET ORAL DAILY
Status: DISCONTINUED | OUTPATIENT
Start: 2017-09-24 | End: 2017-09-24 | Stop reason: HOSPADM

## 2017-09-24 RX ADMIN — FUROSEMIDE 20 MG: 20 TABLET ORAL at 09:57

## 2017-09-24 RX ADMIN — PNEUMOCOCCAL VACCINE POLYVALENT 0.5 ML
25; 25; 25; 25; 25; 25; 25; 25; 25; 25; 25; 25; 25; 25; 25; 25; 25; 25; 25; 25; 25; 25; 25 INJECTION, SOLUTION INTRAMUSCULAR; SUBCUTANEOUS at 12:25

## 2017-09-24 RX ADMIN — ENOXAPARIN SODIUM 40 MG: 40 INJECTION SUBCUTANEOUS at 07:05

## 2017-09-24 RX ADMIN — ACETAMINOPHEN 650 MG: 325 TABLET, FILM COATED ORAL at 04:03

## 2017-09-24 RX ADMIN — ONDANSETRON 4 MG: 4 TABLET, ORALLY DISINTEGRATING ORAL at 10:00

## 2017-09-24 RX ADMIN — IPRATROPIUM BROMIDE AND ALBUTEROL SULFATE 3 ML: .5; 3 SOLUTION RESPIRATORY (INHALATION) at 07:04

## 2017-09-24 RX ADMIN — AZITHROMYCIN MONOHYDRATE 500 MG: 500 INJECTION, POWDER, LYOPHILIZED, FOR SOLUTION INTRAVENOUS at 07:04

## 2017-09-24 RX ADMIN — CEFTRIAXONE SODIUM 1 G: 1 INJECTION, SOLUTION INTRAVENOUS at 06:28

## 2017-09-24 RX ADMIN — HYDROCODONE BITARTRATE AND ACETAMINOPHEN 2 TABLET: 5; 325 TABLET ORAL at 12:48

## 2017-09-24 RX ADMIN — INFLUENZA A VIRUSA/MICHIGAN/45/2015 X-275 (H1N1) ANTIGEN (FORMALDEHYDE INACTIVATED), INFLUENZA A VIRUS A/HONG KONG/4801/2014 X-263B (H3N2) ANTIGEN (FORMALDEHYDE INACTIVATED), AND INFLUENZA B VIRUS B/BRISBANE/60/2008 ANTIGEN (FORMALDEHYDE INACTIVATED) 0.5 ML: 60; 60; 60 INJECTION, SUSPENSION INTRAMUSCULAR at 12:23

## 2017-09-24 ASSESSMENT — PAIN DESCRIPTION - DESCRIPTORS: DESCRIPTORS: ACHING

## 2017-09-24 NOTE — PLAN OF CARE
Face to face report given with opportunity to observe patient.    Report given to RAD Bragg   9/23/2017  2733

## 2017-09-24 NOTE — PLAN OF CARE
"Problem: Goal Outcome Summary  Goal: Goal Outcome Summary  Outcome: No Change  Pt is A&Ox 4.  Assessment as charted. Reports 10/10 pain for headache.  See MAR for interventions.  No hat was in pt's room at the beginning of the shift.  Hat placed to track I&O. Adequate urine output this shift.  No falls or injuries this shift. Pt uses call light appropriately.  Vital signs stable.  Pt placed on 1 lpm during night because pt dropped into the 80's.  Blood pressure 158/92, temperature 98  F (36.7  C), temperature source Tympanic, resp. rate 18, height 1.778 m (5' 10\"), weight 109.7 kg (241 lb 13.5 oz), SpO2 93 %.                "

## 2017-09-24 NOTE — PROGRESS NOTES
[facility]  Hospitalist Progress Note          Assessment and Plan:   1. Sepsis due to a presumed right lower lobe pneumonia, community-acquired pneumonia. He presented with acute onset of shaking chills, fevers, pleuritic pain, came into the ER and found to be hypotensive, mildly hypoxic. Blood cultures were not drawn in ED and where later drawn in the ICU.   He remains on Rocephin and azithromycin, may need to change today due to rash. Additional support for sepsis included elevated Lactic acid, hypotension and acute respiratory failure. He is currently off pressors and IVFs have been tapered.          2. Pulmonary:  RLL CAP with associated acute respiratory failure and sepsis:  Remains on NC O2 as needed-1L, weaning.   Nebs are also ordered.  Dual antibiotic covergae includes Ceftriaxone and Azithromycin, likely need to alter these today due to possible allergy-rash on back.         3. Renal status.  Normal BUN, creatinine and no previous issues. Monitoring.         4. CV:  Hypotension upon arrival due to sepsis secondary from CAP/sepsis.  Resolved with pressors and IVFs.  Monitoring.        5. Lactic acidosis.  Lactic acid has remained slightly elevated initially now has normalized.        6. Infectious Disease:  CAP with sepsis.  Awaiting culture results-no results to this point.  Antibiotic therapy as above.  Unfortunately no cultures obtained in ED.  WBC normalized.  No recent fevers.      7. Derm:  Rash.          8. 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  No molina.  Intermittent bladder scans. Despite large residual volumes he does empty his bladder consistently.           9. Prophylaxis: SQ Lovenox      10. Dispo: 1-3 additional days depending on clinical course.      11. Code:  Full                Interval History:   Patient remains stable.  Basically off IVFs and pressors now 48 hrs.  Still requiring 1L NC while sleeping.  Noted to have a rash on back today.                 Medications:       pneumococcal vaccine  0.5 mL Intramuscular Prior to discharge     influenza Vac Split High-Dose  0.5 mL Intramuscular Prior to discharge     enoxaparin  40 mg Subcutaneous Q24H     cefTRIAXone  1 g Intravenous Q24H     azithromycin  500 mg Intravenous Q24H                  Physical Exam:     Vitals:    17 2342 17 0402 17 0637 17 0704   BP:  158/92     BP Location:       Resp:  18     Temp:  98  F (36.7  C)     TempSrc:  Tympanic     SpO2: 94% 93%  94%   Weight:   105.6 kg (232 lb 12.9 oz)    Height:             Vital Sign Ranges  Temperature Temp  Av.8  F (37.1  C)  Min: 97.6  F (36.4  C)  Max: 99.8  F (37.7  C)   Blood pressure Systolic (24hrs), Av , Min:106 , Max:158        Diastolic (24hrs), Av, Min:59, Max:92      Pulse No Data Recorded   Respirations Resp  Av.4  Min: 15  Max: 18   Pulse oximetry SpO2  Av.7 %  Min: 85 %  Max: 97 %         Intake/Output Summary (Last 24 hours) at 17 0739  Last data filed at 17 0600   Gross per 24 hour   Intake              120 ml   Output             4730 ml   Net            -4610 ml     General:  Alert and Orientated.  NAD.  Heart:  RRR, S1 S2, No murmurs, no rubs, no gallops.  Resp: Rhonchi and crackles in both lower lobes R>L.    Abd: Soft/NT/ND/Positve BS.  Ext: Trace LE edema.  Skin:  Rash noted on back.    Neuro:  No focal Neurologic deficits noted.    Peripheral IV 17 Right Upper forearm (Active)   Site Assessment WDL 2017  4:00 AM   Line Status Infusing 2017  4:00 AM   Phlebitis Scale 0-->no symptoms 2017  4:00 AM   Infiltration Scale 0 2017  4:00 AM   Extravasation? No 2017  8:00 PM   Number of days:3     No line/device             Data:     Lab Results   Component Value Date    WBC 4.9 2017    HGB 12.5 (L) 2017    HCT 35.7 (L) 2017     2017     2017    POTASSIUM 4.1 2017    CHLORIDE 105 2017    CO2  32 09/24/2017    BUN 13 09/24/2017    CR 0.94 09/24/2017    GLC 84 09/24/2017    TROPI <0.015 09/21/2017    AST 31 09/24/2017    ALT 44 09/24/2017    ALKPHOS 84 09/24/2017    BILITOTAL 0.5 09/24/2017    INR 0.9 06/02/2017     Lactic Acid   Date Value Ref Range Status   09/22/2017 1.1 0.4 - 2.0 mmol/L Final   09/21/2017 2.5 (H) 0.4 - 2.0 mmol/L Final     Comment:     Significant value called to and read back by  FRITZ LBANCA AT 2325 ON 9/21/17 BY BORA.     09/21/2017 3.4 (H) 0.4 - 2.0 mmol/L Final     Comment:     Significant value called to and read back by  RODRIGO GALVEZ 1727 9/21/17 BY NATTY Richmond, DO

## 2017-09-24 NOTE — PROGRESS NOTES
Name: Dann Espinoza    MRN#: [id number]    Reason for Hospitalization: Sepsis, due to unspecified organism (H) [A41.9]  Pneumonia of right lower lobe due to infectious organism (H) [J18.1]    Discharge Date: 9/24/2017    Patient / Family response to discharge plan: in agreement    Follow-Up Appt: Future Appointments  Date Time Provider Department Center   9/29/2017 9:45 AM [facility] De[facility]       Other Providers (Care Coordinator, County Services, PCA services etc): No    Discharge Disposition: home transported by john Echavarria

## 2017-09-24 NOTE — PLAN OF CARE
Face to face report given with opportunity to observe patient.    Report given to RAD Casarez   9/24/2017  7:14 AM

## 2017-09-24 NOTE — PHARMACY
[facility]    Pharmacy      Antimicrobial Stewardship Note     Current antimicrobial therapy:  Anti-infectives (Future)    Start     Dose/Rate Route Frequency Ordered Stop    09/21/17 0715  cefTRIAXone in d5w (ROCEPHIN) intermittent infusion 1 g      1 g  over 30 Minutes Intravenous EVERY 24 HOURS 09/21/17 0714 09/21/17 0715  azithromycin (ZITHROMAX) 500 mg in NaCl 0.9 % 250 mL intermittent infusion      500 mg  over 1 Hours Intravenous EVERY 24 HOURS 09/21/17 0714            Indication: CAP     Pertinent labs:  Creatinine   Creatinine   Date Value Ref Range Status   09/24/2017 0.94 0.66 - 1.25 mg/dL Final   09/23/2017 0.91 0.66 - 1.25 mg/dL Final   09/22/2017 1.11 0.66 - 1.25 mg/dL Final     WBC   WBC   Date Value Ref Range Status   09/24/2017 4.9 4.0 - 11.0 10e9/L Final   09/23/2017 6.1 4.0 - 11.0 10e9/L Final   09/22/2017 10.9 4.0 - 11.0 10e9/L Final     ANC No components found for: ANC     Culture Results:     Blood culture [[id number]] Collected: 09/23/17 0829       Order Status: Completed Specimen: Blood Updated: 09/23/17 0834       Blood culture [[id number]] Collected: 09/21/17 0037       Order Status: Completed Specimen: Blood Updated: 09/23/17 0747        Specimen Description Blood        Special Requests Right Arm        Culture Micro No growth after 2 days       Blood culture [[id number]] Collected: 09/21/17 0113       Order Status: Completed Specimen: Blood Updated: 09/23/17 0747        Specimen Description Blood Left Arm        Culture Micro No growth after 2 days       Sputum Culture Aerobic Bacterial [[id number]]        Order Status: No result Specimen: Sputum        Active MRSA Surveillance Culture [[id number]] Collected: 09/21/17 0530       Order Status: Completed Specimen: Nares from Nasopharyngeal Updated: 09/22/17 0652        Specimen Description Nares        Culture Micro No MRSA isolated       Influenza A and B and RSV PCR [[id number]] Collected: 09/21/17 0101       Order Status:  Completed Specimen: Nasopharyngeal from Nasopharyngeal Updated: 09/21/17 1713        Specimen Description Nasopharyngeal        Influenza A PCR Negative         Flu A target RNA not detected, presumed negative for Influenza A or the viral   load is below the limit of detection.              Influenza B PCR Negative         Flu B target RNA not detected, presumed negative for Influenza B or the viral   load is below the limit of detection.              Resp Syncytial Virus Negative         RSV target RNA not detected, presumed negative for Respiratory Syncitial Virus    or the viral load is below the limit of detection.   FDA approved assay performed using [employer] GeneXpert(R) real-time PCR.             Gram stain [[id number]] (Abnormal) Collected: 09/21/17 0900       Order Status: Completed Specimen: Sputum Updated: 09/21/17 0932        Specimen Description Sputum        Gram Stain >10 Squamous epithelial cells/low power field indicates oral contamination. Please   recollect.    (A)         >25 PMNs/low power field         Caled to Yeison Henri at 0930 on 9/21/2017 by Susan Corrales       Sputum Culture Aerobic Bacterial [[id number]] (Abnormal) Collected: 09/21/17 0900       Order Status: Completed Specimen: Sputum Updated: 09/21/17 0929        Specimen Description Sputum        Culture Micro Canceled, Test credited         >10 Squamous epithelial cells/low power field indicates oral contamination. Please   recollect.    (A)           Recommendations/Interventions:  1. No recommendations at this time      Awilda Benítez, [facility]  September 24, 2017

## 2017-09-24 NOTE — DISCHARGE SUMMARY
DATE OF ADMISSION:  09/21/2017      DATE OF DISCHARGE:  09/24/2017      PRIMARY CARE PHYSICIAN:  CHEN Dey MD       ADMISSION DIAGNOSES:  Pleuritic chest pain, chills and rigors.      DISCHARGE DIAGNOSES:   1.  Right lower lobe community-acquired pneumonia.   2.  Sepsis secondary to right lower lobe community-acquired pneumonia with associated hypotension and lactic acidosis, and hypoxia(Acute respiratory failure).   3.  Acute respiratory failure secondary to community-acquired pneumonia with associated hypoxia.   4.  History of asbestosis.   5.  Hypotension secondary to sepsis and community-acquired pneumonia.   6.  Rash, likely allergic reaction to either ceftriaxone or azithromycin.      PROCEDURES AND CONSULTATIONS:  Chest x-ray x2.      PENDING TEST RESULTS AT TIME OF DISCHARGE:  None.      HISTORY OF PRESENTING ILLNESS:  Please see admission H&P.      PAST MEDICAL HISTORY:  Please see admission H&P.      HOSPITAL COURSE:  Mr. Lázaro Espinoza is a 71-year-old male who presented to our facility on 09/21/2017 with pleuritic chest pain, rigors and chills.  Subsequently, he was found to have a right lower lobe pneumonia with associated lactic acidosis, hypoxemia and hypotension.  The patient was subsequently hospitalized in our ICU where he was initially started on pressors and aggressive IV fluid administration.  Over the course of 24 hours, pressors were weaned; however, IV fluids continued.  His blood pressures were a bit soft, however, remained stable with max to low 60s.  In addition, the patient was placed immediately on ceftriaxone and azithromycin upon his admission to the hospital.  Unfortunately, blood cultures were not obtained in the Emergency Department at the time of his admission.  Nevertheless, at this point we do not have any positive blood culture results nor sputum culture results to refer to.      Over the following days, Mr. Espinoza's clinical status continued to improve and he was  gradually weaned off oxygen.  He continued on azithromycin and ceftriaxone during the 3-day hospital course and has remained off of any pressors or IV fluids for the last 24-48 hours.  Respiratory status is stable at this time.      As stated above on the day of discharge, he was noted to have a rash on his back.  Hence, ceftriaxone was discontinued as was azithromycin.  The etiology of the rash is uncertain as to which one of these medications may have been the culprit.  Nevertheless, based upon this finding along with a previous allergy to Levaquin, Mr. Espinoza will be discharged home with Augmentin 875 p.o. b.i.d. for 7 additional days.      As outlined above, Mr. Espinoza's initial presentation was consistent with sepsis secondary to community-acquired pneumonia.  Associated diagnoses included acute respiratory failure with hypoxemia, lactic acidosis and hypotension.  The patient has improved dramatically, is without any fevers, is satting well on room air and will be discharged home today on Augmentin due to concern for allergy as to either azithromycin and/or ceftriaxone along with a history of an allergy to Levaquin.      MEDICATIONS AT TIME OF DISCHARGE:  No changes were made to his medications at time of discharge except for the addition of Augmentin 875 mg p.o. b.i.d. for 7 additional days.      PHYSICAL EXAMINATION AT TIME OF DISCHARGE:   VITAL SIGNS:  Blood pressure is 127/63, respirations 18, O2 sats 95% on room air, temperature 98.3.  Pulse is about 80.   GENERAL:  The patient is alert and oriented, pleasant, conversant and in no acute distress.   HEART:  Regular in rate and rhythm, no murmurs, rubs or gallops were appreciated.   LUNGS:  Generally clear in the upper fields.  He still does have some soft rhonchi in the lower lobes, right may be a bit more than left.   ABDOMEN:  Obese, soft, nontender, with positive bowel sounds.   EXTREMITIES:  Trace to +1 symmetric lower extremity edema.   INTEGUMENT:   Remarkable for rash mostly on the upper torso and back at time of discharge.    ENT:  There was no airway compromise or swelling of the tongue associated with this rash.        LABORATORY AND TESTS:  Sodium 140, potassium 4.1, BUN 13, creatinine 0.94, hemoglobin 12.5 and white count is 4.9 at time of discharge.      ADDITIONAL DISCHARGE INSTRUCTIONS:   1.  Code status is full.   2.  Diet regular as tolerated.   3.  Activity as tolerated.      FOLLOWUP APPOINTMENTS AND REFERRALS:  The patient should follow up with Dr. Navneet Dey within the next week.         RAF GILL DO             D: 2017 10:59   T: 2017 11:52   MT: HOLLY      Name:     JHONATHAN WASHINGTON   MRN:      [id number]        Account:        [id number]   :      1945           Admit Date:                                       Discharge Date:       Document: [id number]       cc: WASHINGTON Dey MD

## 2017-09-24 NOTE — PLAN OF CARE
"Pt received as step down from icu. Alert and oriented. Vital signs stable. Norco 10mg given for pain with good effect.   Vital signs:  Temp: 97.6  F (36.4  C) Temp src: Tympanic BP: 141/59   Heart Rate: 88 Resp: 16 SpO2: (!) 90 % O2 Device: None (Room air) Oxygen Delivery: 1 LPM Height: 177.8 cm (5' 10\") Weight: 109.7 kg (241 lb 13.5 oz)  Estimated body mass index is 34.7 kg/(m^2) as calculated from the following:    Height as of this encounter: 1.778 m (5' 10\").    Weight as of this encounter: 109.7 kg (241 lb 13.5 oz).        Face to face report given with opportunity to observe patient.    Report given to Stephanie Thurston   9/23/2017  11:54 PM      "

## 2017-09-24 NOTE — PLAN OF CARE
Problem: Patient Care Overview  Goal: Discharge Needs Assessment  Patient discharged at 1:09 PM via wheel chair accompanied by spouse and daughter and staff. Prescriptions sent to patients preferred pharmacy. All belongings sent with patient.      Discharge instructions reviewed with patient, spouse, and daughter. All belongings gathered and returned to patient.      Patient discharged to home.   Report called to N/A     Core Measures and Vaccines  Core Measures applicable during stay: Yes. If yes, state diagnosis: Pneumonia/Sepsis  Pneumonia and Influenza given prior to discharge, if indicated: Yes     Surgical Patient   Surgical Procedures during stay: N/A  Did patient receive discharge instruction on wound care and recognition of infection symptoms? N/A     MISC  Follow up appointment made:  Yes  Home and hospital aquired medications returned to patient: N/A  Patient reports pain was well managed at discharge: Yes

## 2017-09-24 NOTE — PLAN OF CARE
Face to face report given with opportunity to observe patient.  Report given to RAD Trevino.    Latisha Gupta  9/23/2017, 7:12 PM

## 2017-09-24 NOTE — PLAN OF CARE
Received patient to room 3114 as step down from ICU. Report received from Belinda GONZALEZ RN at bedside. Patient is awake A&O x 3 .

## 2017-09-24 NOTE — PLAN OF CARE
Patient reported headache and requested 2 Norco this morning then declined them due to feeling nausea after breakfast. Received Zofran 4 mg PO with relief per patient. Rash and edema noted to torso/back, per report from [facility] nurse MD is aware. Able to wean off O2, maintaining Sats 93-95% on RA.

## 2017-09-25 DIAGNOSIS — A41.9 SEPSIS, DUE TO UNSPECIFIED ORGANISM: ICD-10-CM

## 2017-09-25 DIAGNOSIS — J18.9 PNEUMONIA OF RIGHT LOWER LOBE DUE TO INFECTIOUS ORGANISM: ICD-10-CM

## 2017-09-25 NOTE — TELEPHONE ENCOUNTER
Augmentin reordered for Dr. Richmond. Refill received an error notification that e-prescription did not go through to pharmacy. Rx ready to be signed by Dr. Richmond.

## 2017-09-27 ENCOUNTER — TELEPHONE (OUTPATIENT)
Dept: CASE MANAGEMENT | Facility: HOSPITAL | Age: 72
End: 2017-09-27

## 2017-09-27 LAB
BACTERIA SPEC CULT: NORMAL
BACTERIA SPEC CULT: NORMAL
Lab: NORMAL
SPECIMEN SOURCE: NORMAL
SPECIMEN SOURCE: NORMAL

## 2017-09-27 NOTE — TELEPHONE ENCOUNTER
The Patient, Dann, was discharged to home on 9/24/17 from [facility]. I spoke today with Dann regarding the patient's discharge.     He indicates they did receive sufficient information upon discharge. Medications were reviewed in full on discharge, including: Medications to be started; medications to be stopped; medications to be continued from preadmission and any side effects. Prescriptions were received at discharge and were able to be filled. Medications are being taken as prescribed.     He indicates they have an appointment scheduled for 9/29/17 and have transportation available. They are able to confirm their appointment time and have it written down.     Questions regarding discharge instructions or condition have been answered.    Per their request, the following employee (s) can be recognized for their outstanding services delivered:  All were good    Suggestions to improve service: n/a     He was informed they may receive a survey in the mail from the hospital. Asked if they would kindly complete the survey in order for [facility] to know if services fully met patient needs.

## 2017-09-29 ENCOUNTER — OFFICE VISIT (OUTPATIENT)
Dept: FAMILY MEDICINE | Facility: OTHER | Age: 72
End: 2017-09-29
Attending: FAMILY MEDICINE
Payer: COMMERCIAL

## 2017-09-29 VITALS
DIASTOLIC BLOOD PRESSURE: 62 MMHG | HEART RATE: 83 BPM | OXYGEN SATURATION: 95 % | TEMPERATURE: 97.6 F | WEIGHT: 216 LBS | BODY MASS INDEX: 30.92 KG/M2 | HEIGHT: 70 IN | SYSTOLIC BLOOD PRESSURE: 106 MMHG

## 2017-09-29 DIAGNOSIS — Z87.01 HISTORY OF PNEUMONIA: Primary | ICD-10-CM

## 2017-09-29 LAB
BACTERIA SPEC CULT: NORMAL
Lab: NORMAL
SPECIMEN SOURCE: NORMAL

## 2017-09-29 PROCEDURE — 99214 OFFICE O/P EST MOD 30 MIN: CPT | Performed by: FAMILY MEDICINE

## 2017-09-29 PROCEDURE — 99213 OFFICE O/P EST LOW 20 MIN: CPT

## 2017-09-29 ASSESSMENT — ANXIETY QUESTIONNAIRES
4. TROUBLE RELAXING: SEVERAL DAYS
GAD7 TOTAL SCORE: 3
6. BECOMING EASILY ANNOYED OR IRRITABLE: NOT AT ALL
2. NOT BEING ABLE TO STOP OR CONTROL WORRYING: NOT AT ALL
5. BEING SO RESTLESS THAT IT IS HARD TO SIT STILL: SEVERAL DAYS
IF YOU CHECKED OFF ANY PROBLEMS ON THIS QUESTIONNAIRE, HOW DIFFICULT HAVE THESE PROBLEMS MADE IT FOR YOU TO DO YOUR WORK, TAKE CARE OF THINGS AT HOME, OR GET ALONG WITH OTHER PEOPLE: NOT DIFFICULT AT ALL
7. FEELING AFRAID AS IF SOMETHING AWFUL MIGHT HAPPEN: NOT AT ALL
3. WORRYING TOO MUCH ABOUT DIFFERENT THINGS: NOT AT ALL
1. FEELING NERVOUS, ANXIOUS, OR ON EDGE: SEVERAL DAYS

## 2017-09-29 ASSESSMENT — PATIENT HEALTH QUESTIONNAIRE - PHQ9: SUM OF ALL RESPONSES TO PHQ QUESTIONS 1-9: 2

## 2017-09-29 ASSESSMENT — PAIN SCALES - GENERAL: PAINLEVEL: NO PAIN (0)
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
## 2017-09-29 NOTE — NURSING NOTE
"Chief Complaint   Patient presents with     Hospital F/U       Initial /62 (BP Location: Left arm, Patient Position: Chair, Cuff Size: Adult Large)  Pulse 83  Temp 97.6  F (36.4  C) (Tympanic)  Ht 5' 10\" (1.778 m)  Wt 216 lb (98 kg)  SpO2 95%  BMI 30.99 kg/m2 Estimated body mass index is 30.99 kg/(m^2) as calculated from the following:    Height as of this encounter: 5' 10\" (1.778 m).    Weight as of this encounter: 216 lb (98 kg).  Medication Reconciliation: complete     ROSA HAYWARD      "

## 2017-09-29 NOTE — MR AVS SNAPSHOT
"              After Visit Summary   9/29/2017    Dann Espinoza    MRN: [id number]           Patient Information     Date Of Birth          1945        Visit Information        Provider Department      9/29/2017 9:45 AM WASHINGTON Dey MD [facility]        Care Instructions    Go to the ER if problems          Follow-ups after your visit        Follow-up notes from your care team     Return in about 5 weeks (around 11/3/2017).      Your next 10 appointments already scheduled     Nov 07, 2017  8:45 AM CST   (Arrive by 8:30 AM)   Office Visit with WASHINGTON Dey MD   [facility] [place] ([facility] [place] )    [address]  [place] [address]   [phone]           Bring a current list of meds and any records pertaining to this visit.  For Physicals, please bring immunization records and any forms needing to be filled out.  Please arrive 15 minutes early to complete paperwork and register.              Who to contact     If you have questions or need follow up information about today's clinic visit or your schedule please contact [facility] directly at [phone].  Normal or non-critical lab and imaging results will be communicated to you by [employer], letter or phone within 4 business days after the clinic has received the results. If you do not hear from us within 7 days, please contact the clinic through [employer] or phone. If you have a critical or abnormal lab result, we will notify you by phone as soon as possible.  Submit refill requests through [employer] or call your pharmacy and they will forward the refill request to us. Please allow 3 business days for your refill to be completed.          Additional Information About Your Visit        [facility] Information     [employer] lets you send messages to your doctor, view your test results, renew your prescriptions, schedule appointments and more. To sign up, go to [email or web address] . Click on \"Log in\" on " "the left side of the screen, which will take you to the Welcome page. Then click on \"Sign up Now\" on the right side of the page.     You will be asked to enter the access code listed below, as well as some personal information. Please follow the directions to create your username and password.     Your access code is: [email or web address]  Expires: 2017  9:41 PM     Your access code will  in 90 days. If you need help or a new code, please call your [facility] or [phone].        Care EveryWhere ID     This is your Care EveryWhere ID. This could be used by other organizations to access your [facility] medical records  [id number]        Your Vitals Were     Pulse Temperature Height Pulse Oximetry BMI (Body Mass Index)       83 97.6  F (36.4  C) (Tympanic) 5' 10\" (1.778 m) 95% 30.99 kg/m2        Blood Pressure from Last 3 Encounters:   17 106/62   17 127/63   17 118/64    Weight from Last 3 Encounters:   17 216 lb (98 kg)   17 232 lb 12.9 oz (105.6 kg)   17 216 lb (98 kg)              Today, you had the following     No orders found for display       Primary Care Provider Office Phone # Fax #    R Navneet Dey [phone]       [address] [address]        Equal Access to Services     [facility] AH: Hadii aad ku hadasho Soomaali, waaxda luqadaha, qaybta kaalmada adeegyada, fallon sánchez . So [facility] [phone].    ATENCIÓN: Si habla español, tiene a elder disposición servicios gratuitos de asistencia lingüística. Llame al [phone].    We comply with applicable federal civil rights laws and [place] laws. We do not discriminate on the basis of race, color, national origin, age, disability sex, sexual orientation or gender identity.            Thank you!     Thank you for choosing [facility]  for your care. Our goal is always to provide you with excellent care. Hearing back " from our patients is one way we can continue to improve our services. Please take a few minutes to complete the written survey that you may receive in the mail after your visit with us. Thank you!             Your Updated Medication List - Protect others around you: Learn how to safely use, store and throw away your medicines at www.disposemymeds.org.          This list is accurate as of: 9/29/17  9:56 AM.  Always use your most recent med list.                   Brand Name Dispense Instructions for use Diagnosis    albuterol 108 (90 BASE) MCG/ACT Inhaler    PROAIR HFA/PROVENTIL HFA/VENTOLIN HFA    1 Inhaler    Inhale 2 puffs into the lungs every 6 hours as needed for shortness of breath / dyspnea or wheezing        allopurinol 300 MG tablet    ZYLOPRIM     Take 0.5 tablets by mouth daily.        amoxicillin-clavulanate 875-125 MG per tablet    AUGMENTIN    14 tablet    Take 1 tablet by mouth 2 times daily (with meals)    Pneumonia of right lower lobe due to infectious organism (H), Sepsis, due to unspecified organism (H)       aspirin 81 MG EC tablet   Generic drug:  aspirin      Take 1 tablet by mouth daily.        ATORVASTATIN CALCIUM PO      Take 40 mg by mouth daily        HYDROcodone-acetaminophen 5-325 MG per tablet    NORCO    60 tablet    TAKE ONE TABLET BY MOUTH EVRY 6 HOURS AS NEEDED FOR MODERATE TO SEVERE PAIN    Hip pain, left       Vitamin D3 2000 UNITS Tabs      Take 1 tablet by mouth daily.

## 2017-09-30 ASSESSMENT — ANXIETY QUESTIONNAIRES: GAD7 TOTAL SCORE: 3

## 2017-10-05 ENCOUNTER — TRANSFERRED RECORDS (OUTPATIENT)
Dept: HEALTH INFORMATION MANAGEMENT | Facility: HOSPITAL | Age: 72
End: 2017-10-05

## 2017-10-09 DIAGNOSIS — M25.552 HIP PAIN, LEFT: ICD-10-CM

## 2017-10-09 RX ORDER — HYDROCODONE BITARTRATE AND ACETAMINOPHEN 5; 325 MG/1; MG/1
TABLET ORAL
Qty: 60 TABLET | Refills: 0 | Status: SHIPPED | OUTPATIENT
Start: 2017-10-09 | End: 2017-11-09

## 2017-10-09 NOTE — TELEPHONE ENCOUNTER
Controlled Substance Refill Request for Norco  Problem List Complete:  No     PROVIDER TO CONSIDER COMPLETION OF PROBLEM LIST AND OVERVIEW/CONTROLLED SUBSTANCE AGREEMENT    Last Written Prescription Date:  9/7/17  Last Fill Quantity: 60,   # refills: 0    [facility]: 9/29/17    Future Office visit:   Next 5 appointments (look out 90 days)     Nov 07, 2017  8:45 AM CST   (Arrive by 8:30 AM)   Office Visit with WASHINGTON Dey MD   [facility] ([facility] )    [address] [place] [address]   [phone]                  Controlled substance agreement on file: No.     Processing:  Patient will  in clinic

## 2017-10-10 NOTE — TELEPHONE ENCOUNTER
Pt notified that the written RX is ready at the [facility]  registration to be picked up.

## 2017-11-09 DIAGNOSIS — M25.552 HIP PAIN, LEFT: ICD-10-CM

## 2017-11-09 RX ORDER — HYDROCODONE BITARTRATE AND ACETAMINOPHEN 5; 325 MG/1; MG/1
TABLET ORAL
Qty: 60 TABLET | Refills: 0 | Status: SHIPPED | OUTPATIENT
Start: 2017-11-09 | End: 2017-12-07

## 2017-11-09 NOTE — TELEPHONE ENCOUNTER
Called patient to inform prescription will be up at registration for . Prescription brought up front to registration.

## 2017-11-09 NOTE — TELEPHONE ENCOUNTER
norco      Last Written Prescription Date: 10/9/17  Last Fill Quantity: 60,  # refills: 0   Last Office Visit with G, P or [facility] prescribing provider: 9/29/17

## 2017-12-07 DIAGNOSIS — M25.552 HIP PAIN, LEFT: ICD-10-CM

## 2017-12-07 RX ORDER — HYDROCODONE BITARTRATE AND ACETAMINOPHEN 5; 325 MG/1; MG/1
TABLET ORAL
Qty: 60 TABLET | Refills: 0 | Status: SHIPPED | OUTPATIENT
Start: 2017-12-07 | End: 2018-01-04

## 2017-12-07 NOTE — TELEPHONE ENCOUNTER
norco      Last Written Prescription Date: 11/9/17  Last Fill Quantity: 60,  # refills: 0   Last Office Visit with G, P or [facility] prescribing provider: 9/29/17

## 2017-12-08 NOTE — TELEPHONE ENCOUNTER
Left message the Rx was received by  and at the  in [facility] to be picked up.

## 2017-12-08 NOTE — TELEPHONE ENCOUNTER
Pt notified that the written RX is ready at the [facility]  registration to be picked up.

## 2018-01-04 DIAGNOSIS — M25.552 HIP PAIN, LEFT: ICD-10-CM

## 2018-01-04 RX ORDER — HYDROCODONE BITARTRATE AND ACETAMINOPHEN 5; 325 MG/1; MG/1
TABLET ORAL
Qty: 60 TABLET | Refills: 0 | Status: SHIPPED | OUTPATIENT
Start: 2018-01-04 | End: 2018-02-01

## 2018-01-04 NOTE — TELEPHONE ENCOUNTER
Pt notified that the written RX is ready at the [facility]  registration to be picked up.

## 2018-02-01 DIAGNOSIS — M25.552 HIP PAIN, LEFT: ICD-10-CM

## 2018-02-01 NOTE — TELEPHONE ENCOUNTER
Controlled Substance Refill Request for Norco  Problem List Complete:  No     PROVIDER TO CONSIDER COMPLETION OF PROBLEM LIST AND OVERVIEW/CONTROLLED SUBSTANCE AGREEMENT    Last Written Prescription Date:  1.4.18  Last Fill Quantity: 60,   # refills: 0    Last Office Visit with [facility] primary care provider: 11.7.17    Future Office visit:     Controlled substance agreement on file: No.     Processing:  Patient will  in clinic

## 2018-02-02 RX ORDER — HYDROCODONE BITARTRATE AND ACETAMINOPHEN 5; 325 MG/1; MG/1
TABLET ORAL
Qty: 60 TABLET | Refills: 0 | Status: SHIPPED | OUTPATIENT
Start: 2018-02-02 | End: 2018-03-06

## 2018-02-02 NOTE — TELEPHONE ENCOUNTER
Left message notifying patient that hard copy RX is ready at the [facility]  registration to be picked up.

## 2018-03-06 DIAGNOSIS — M25.552 HIP PAIN, LEFT: ICD-10-CM

## 2018-03-06 RX ORDER — HYDROCODONE BITARTRATE AND ACETAMINOPHEN 5; 325 MG/1; MG/1
TABLET ORAL
Qty: 60 TABLET | Refills: 0 | Status: SHIPPED | OUTPATIENT
Start: 2018-03-06 | End: 2018-03-27

## 2018-03-06 NOTE — TELEPHONE ENCOUNTER
destinyco      Last Written Prescription Date:  2/2/18  Last Fill Quantity: 60,   # refills: 0  Last Office Visit: 9/29/17  Future Office visit:       Routing refill request to provider for review/approval because:  Drug not on the [facility], [facility] or [facility] refill protocol or controlled substance

## 2018-03-06 NOTE — TELEPHONE ENCOUNTER
Pt notified that the written RX is ready at the [facility]  registration to be picked up.

## 2018-03-27 DIAGNOSIS — M25.552 HIP PAIN, LEFT: ICD-10-CM

## 2018-03-27 RX ORDER — HYDROCODONE BITARTRATE AND ACETAMINOPHEN 5; 325 MG/1; MG/1
TABLET ORAL
Qty: 60 TABLET | Refills: 0 | Status: SHIPPED | OUTPATIENT
Start: 2018-03-27 | End: 2018-05-08

## 2018-04-04 DIAGNOSIS — M10.00 IDIOPATHIC GOUT, UNSPECIFIED CHRONICITY, UNSPECIFIED SITE: Primary | ICD-10-CM

## 2018-04-04 NOTE — TELEPHONE ENCOUNTER
Allopurinol      Last Written Prescription Date:  Unknown reported by patient on med list   Last Fill Quantity: unknoww,   # refills: unknown  Last Office Visit: 11/07/2017  Future Office visit:

## 2018-04-05 RX ORDER — ALLOPURINOL 300 MG/1
0.5 TABLET ORAL DAILY
Qty: 90 TABLET | Refills: 1 | Status: SHIPPED | OUTPATIENT
Start: 2018-04-05 | End: 2018-04-12

## 2018-04-12 RX ORDER — ALLOPURINOL 300 MG/1
300 TABLET ORAL DAILY
Qty: 90 TABLET | Refills: 1 | Status: SHIPPED | OUTPATIENT
Start: 2018-04-12 | End: 2018-11-20

## 2018-04-12 NOTE — TELEPHONE ENCOUNTER
Spoke with patient, [facility]. Also takes 1 300mg tablet not 1/2 tablet. Of allopurinol. Please sign new order   ROSA HAYWARD

## 2018-04-24 ENCOUNTER — HEALTH MAINTENANCE LETTER (OUTPATIENT)
Age: 73
End: 2018-04-24

## 2018-05-08 DIAGNOSIS — M25.552 HIP PAIN, LEFT: ICD-10-CM

## 2018-05-08 RX ORDER — HYDROCODONE BITARTRATE AND ACETAMINOPHEN 5; 325 MG/1; MG/1
TABLET ORAL
Qty: 60 TABLET | Refills: 0 | Status: SHIPPED | OUTPATIENT
Start: 2018-05-08 | End: 2018-05-30

## 2018-05-08 NOTE — TELEPHONE ENCOUNTER
destinyco      Last Written Prescription Date:  3/27/18  Last Fill Quantity: 60,   # refills: 0  Last Office Visit: 11/7/17  Future Office visit:       Routing refill request to provider for review/approval because:  Drug not on the [facility], [facility] or [facility] refill protocol or controlled substance

## 2018-05-30 DIAGNOSIS — M25.552 HIP PAIN, LEFT: ICD-10-CM

## 2018-05-30 NOTE — TELEPHONE ENCOUNTER
norco      Last Written Prescription Date:  5/8/18  Last Fill Quantity: 60,   # refills: 0  Last Office Visit: 11/7/17  Future Office visit:    Next 5 appointments (look out 90 days)     May 31, 2018  2:45 PM CDT   (Arrive by 2:30 PM)   Office Visit with WASHINGTON Dey MD   [facility] [place] ([facility] )    [address]  [place] [address]   [phone]                   Routing refill request to provider for review/approval because:  Drug not on the [facility], [facility] or  Health refill protocol or controlled substance

## 2018-05-30 NOTE — PROGRESS NOTES
SUBJECTIVE:   Dann Espinoza is a 72 year old male who presents to clinic today for the following health issues:      Musculoskeletal problem/pain      Duration: many years, follow up    Description  Location: left low back, hip , and leg and calf    Intensity:  moderate    Accompanying signs and symptoms: radiation of pain to foot    History  Previous similar problem: YES- chronic  Previous evaluation:  x-ray and MRI    Precipitating or alleviating factors:  Trauma or overuse: YES- fell years ago  Aggravating factors include: standing, walking, climbing stairs and overuse    Therapies tried and outcome: heat, ice, acetaminophen, Ibuprofen and norco    Suddenly as the pain came at left.  He has no further back pain.  He is not interested in doing a colonoscopy but would be willing to do an I fob test.  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 Authorizing Provider   albuterol (PROAIR HFA/PROVENTIL HFA/VENTOLIN HFA) 108 (90 BASE) MCG/ACT Inhaler Inhale 2 puffs into the lungs every 6 hours as needed for shortness of breath / dyspnea or wheezing 6/7/17  Yes Luz Marina Lyons APRN FNP   allopurinol (ZYLOPRIM) 300 MG  tablet Take 1 tablet (300 mg) by mouth daily 4/12/18  Yes WASHINGTON Dey MD   aspirin (ASPIR-81) 81 MG EC tablet Take 1 tablet by mouth daily.   Yes Reported, Patient   ATORVASTATIN CALCIUM PO Take 40 mg by mouth daily   Yes Reported, Patient   Cholecalciferol (VITAMIN D3) 2000 UNITS TABS Take 1 tablet by mouth daily.   Yes Reported, Patient   Docusate Sodium (OCONNOR STOOL SOFTENER PO)    Yes Reported, Patient   HYDROcodone-acetaminophen (NORCO) 5-325 MG per tablet TAKE ONE TABLET BY MOUTH EVRY 6 HOURS AS NEEDED FOR MODERATE TO SEVERE PAIN 5/31/18  Yes WASHINGTON Dey MD   INDOMETHACIN PO    Yes Reported, Patient   multivitamin, therapeutic with minerals (THERA-VIT-M) TABS tablet Take 1 tablet by mouth daily   Yes Reported, Patient   UNABLE TO FIND MEDICATION NAME: medication for prostate. Unknown name   Yes Reported, Patient       ALLERGIES:     Allergies   Allergen Reactions     Azithromycin Rash     He developed a rash when on both Azithromycin and Ceftriaxone- unsure which one may have been the culprit       Ceftriaxone Rash     He developed a rash when he was on Ceftriaxone + Azithromycin     Levaquin [Levofloxacin] Rash       ROS:  Constitutional, HEENT, cardiovascular, pulmonary, gi and gu systems are negative, except as otherwise noted.      EXAM:  /64  Pulse 72  Temp 97.1  F (36.2  C) (Tympanic)  Wt 226 lb (102.5 kg)  SpO2 95%  BMI 32.43 kg/m2 Body mass index is 32.43 kg/(m^2).   GENERAL APPEARANCE: healthy, alert and no distress  EYES: Eyes grossly normal to inspection, PERRL and conjunctivae and sclerae normal  RESP: lungs clear to auscultation - no rales, rhonchi or wheezes  CV: regular rates and rhythm, normal S1 S2, no S3 or S4 and no murmur, click or rub  LUNGS: Clear no tenderness on his left flank area  NEURO: Normal strength and tone, mentation intact and speech normal  Lab/ X-ray  No results found for this or any previous visit (from the past 24 hour(s)).    ASSESSMENT/PLAN:     ICD-10-CM    1. Need for hepatitis C screening test Z11.59 Hepatitis C antibody   2. Special screening for malignant neoplasms, colon Z12.11 Immunos occult blood   Whatever pain he had is resolved.  If it recurs he will come in for evaluation.  He is willing to do a hepatitis C test today and also he is willing to do an IFOB test.  [facility]      CHEN Dey MD  May 31, 2018

## 2018-05-31 ENCOUNTER — OFFICE VISIT (OUTPATIENT)
Dept: FAMILY MEDICINE | Facility: OTHER | Age: 73
End: 2018-05-31
Attending: FAMILY MEDICINE
Payer: COMMERCIAL

## 2018-05-31 VITALS
DIASTOLIC BLOOD PRESSURE: 64 MMHG | WEIGHT: 226 LBS | HEART RATE: 72 BPM | SYSTOLIC BLOOD PRESSURE: 130 MMHG | OXYGEN SATURATION: 95 % | TEMPERATURE: 97.1 F | BODY MASS INDEX: 32.43 KG/M2

## 2018-05-31 DIAGNOSIS — Z12.11 SPECIAL SCREENING FOR MALIGNANT NEOPLASMS, COLON: ICD-10-CM

## 2018-05-31 DIAGNOSIS — Z11.59 NEED FOR HEPATITIS C SCREENING TEST: Primary | ICD-10-CM

## 2018-05-31 PROCEDURE — 86803 HEPATITIS C AB TEST: CPT | Mod: ZL | Performed by: FAMILY MEDICINE

## 2018-05-31 PROCEDURE — 99000 SPECIMEN HANDLING OFFICE-LAB: CPT | Performed by: FAMILY MEDICINE

## 2018-05-31 PROCEDURE — 99213 OFFICE O/P EST LOW 20 MIN: CPT | Performed by: FAMILY MEDICINE

## 2018-05-31 PROCEDURE — G0463 HOSPITAL OUTPT CLINIC VISIT: HCPCS

## 2018-05-31 PROCEDURE — 36415 COLL VENOUS BLD VENIPUNCTURE: CPT | Mod: ZL | Performed by: FAMILY MEDICINE

## 2018-05-31 RX ORDER — MULTIPLE VITAMINS W/ MINERALS TAB 9MG-400MCG
1 TAB ORAL DAILY
COMMUNITY
End: 2019-04-11

## 2018-05-31 RX ORDER — HYDROCODONE BITARTRATE AND ACETAMINOPHEN 5; 325 MG/1; MG/1
TABLET ORAL
Qty: 60 TABLET | Refills: 0 | Status: SHIPPED | OUTPATIENT
Start: 2018-05-31 | End: 2018-06-13

## 2018-05-31 ASSESSMENT — PAIN SCALES - GENERAL: PAINLEVEL: EXTREME PAIN (8)

## 2018-05-31 NOTE — LETTER
June 6, 2018      Dann Espinoza  [address]  [facility] [address]        Dear ,    We are writing to inform you of your test results because we were unable to reach you via telephone. Your results are below.      Your hepatitis C test was negative.     Resulted Orders   Hepatitis C antibody   Result Value Ref Range    Hepatitis C Antibody Nonreactive NR^Nonreactive      Comment:      Assay performance characteristics have not been established for newborns,   infants, and children         If you have any questions or concerns, please call the clinic at the number listed above.       Sincerely,        WASHINGTON Dey MD

## 2018-05-31 NOTE — TELEPHONE ENCOUNTER
Patient notified prescription is ready to be picked up at the [facility], Registration.   Pt stated he will  Rx script, he has an appt at the clinic today.

## 2018-05-31 NOTE — NURSING NOTE
"Chief Complaint   Patient presents with     Back Pain     Panel Management     I Fob, Colonoscopy, Hep C, AA screen        Initial /64  Pulse 72  Temp 97.1  F (36.2  C) (Tympanic)  Wt 226 lb (102.5 kg)  SpO2 95%  BMI 32.43 kg/m2 Estimated body mass index is 32.43 kg/(m^2) as calculated from the following:    Height as of 9/29/17: 5' 10\" (1.778 m).    Weight as of this encounter: 226 lb (102.5 kg).  Medication Reconciliation: complete    Doris Moran MA    "

## 2018-06-04 LAB — HCV AB SERPL QL IA: NONREACTIVE

## 2018-06-05 DIAGNOSIS — Z12.11 SPECIAL SCREENING FOR MALIGNANT NEOPLASMS, COLON: ICD-10-CM

## 2018-06-05 LAB — HEMOCCULT SP1 STL QL: NEGATIVE

## 2018-06-05 PROCEDURE — 82274 ASSAY TEST FOR BLOOD FECAL: CPT | Mod: ZL | Performed by: FAMILY MEDICINE

## 2018-06-05 RX ORDER — TAMSULOSIN HYDROCHLORIDE 0.4 MG/1
0.4 CAPSULE ORAL 2 TIMES DAILY
COMMUNITY

## 2018-06-13 DIAGNOSIS — M25.552 HIP PAIN, LEFT: ICD-10-CM

## 2018-06-13 NOTE — TELEPHONE ENCOUNTER
norco   Last Written Prescription Date:  5/31/18  Last Fill Quantity: 60,   # refills: 0  Last Office Visit: 5/31/18  Future Office visit:       Routing refill request to provider for review/approval because:  Drug not on the [facility], [facility] or [facility] refill protocol or controlled substance

## 2018-06-14 RX ORDER — HYDROCODONE BITARTRATE AND ACETAMINOPHEN 5; 325 MG/1; MG/1
TABLET ORAL
Qty: 60 TABLET | Refills: 0 | Status: SHIPPED | OUTPATIENT
Start: 2018-06-14 | End: 2018-09-28

## 2018-09-28 ENCOUNTER — OFFICE VISIT (OUTPATIENT)
Dept: FAMILY MEDICINE | Facility: OTHER | Age: 73
End: 2018-09-28
Attending: FAMILY MEDICINE
Payer: COMMERCIAL

## 2018-09-28 ENCOUNTER — TELEPHONE (OUTPATIENT)
Dept: FAMILY MEDICINE | Facility: OTHER | Age: 73
End: 2018-09-28

## 2018-09-28 VITALS
BODY MASS INDEX: 32.14 KG/M2 | WEIGHT: 224 LBS | HEART RATE: 71 BPM | OXYGEN SATURATION: 93 % | TEMPERATURE: 97.9 F | SYSTOLIC BLOOD PRESSURE: 118 MMHG | DIASTOLIC BLOOD PRESSURE: 68 MMHG

## 2018-09-28 DIAGNOSIS — J20.9 ACUTE BRONCHITIS, UNSPECIFIED ORGANISM: Primary | ICD-10-CM

## 2018-09-28 PROCEDURE — G0463 HOSPITAL OUTPT CLINIC VISIT: HCPCS

## 2018-09-28 PROCEDURE — 99213 OFFICE O/P EST LOW 20 MIN: CPT | Performed by: FAMILY MEDICINE

## 2018-09-28 ASSESSMENT — ANXIETY QUESTIONNAIRES
GAD7 TOTAL SCORE: 2
6. BECOMING EASILY ANNOYED OR IRRITABLE: SEVERAL DAYS
2. NOT BEING ABLE TO STOP OR CONTROL WORRYING: NOT AT ALL
3. WORRYING TOO MUCH ABOUT DIFFERENT THINGS: NOT AT ALL
4. TROUBLE RELAXING: SEVERAL DAYS
5. BEING SO RESTLESS THAT IT IS HARD TO SIT STILL: NOT AT ALL
1. FEELING NERVOUS, ANXIOUS, OR ON EDGE: NOT AT ALL
7. FEELING AFRAID AS IF SOMETHING AWFUL MIGHT HAPPEN: NOT AT ALL

## 2018-09-28 ASSESSMENT — PAIN SCALES - GENERAL: PAINLEVEL: NO PAIN (0)

## 2018-09-28 NOTE — MR AVS SNAPSHOT
After Visit Summary   9/28/2018    Dann Espinoza    MRN: [id number]           Patient Information     Date Of Birth          1945        Visit Information        Provider Department      9/28/2018 11:15 AM WASHINGTON Dey MD [facility]        Today's Diagnoses     Acute bronchitis, unspecified organism    -  1       Follow-ups after your visit        Your next 10 appointments already scheduled     Sep 28, 2018 11:15 AM CDT   (Arrive by 11:00 AM)   Office Visit with WASHINGTON Dey MD   [facility] ([facility] )    [address] [place] [address]   [phone]           Bring a current list of meds and any records pertaining to this visit.  For Physicals, please bring immunization records and any forms needing to be filled out.  Please arrive 15 minutes early to complete paperwork and register.              Who to contact     If you have questions or need follow up information about today's clinic visit or your schedule please contact [facility] directly at [phone].  Normal or non-critical lab and imaging results will be communicated to you by MyChart, letter or phone within 4 business days after the clinic has received the results. If you do not hear from us within 7 days, please contact the clinic through MyChart or phone. If you have a critical or abnormal lab result, we will notify you by phone as soon as possible.  Submit refill requests through [employer] or call your pharmacy and they will forward the refill request to us. Please allow 3 business days for your refill to be completed.          Additional Information About Your Visit        Care EveryWhere ID     This is your Care EveryWhere ID. This could be used by other organizations to access your [facility] medical records  [id number]        Your Vitals Were     Pulse Temperature Pulse Oximetry BMI (Body Mass Index)          71 97.9  F (36.6   C) (Tympanic) 93% 32.14 kg/m2         Blood Pressure from Last 3 Encounters:   09/28/18 118/68   05/31/18 130/64   09/29/17 106/62    Weight from Last 3 Encounters:   09/28/18 224 lb (101.6 kg)   05/31/18 226 lb (102.5 kg)   09/29/17 216 lb (98 kg)              Today, you had the following     No orders found for display         Today's Medication Changes          These changes are accurate as of 9/28/18 11:00 AM.  If you have any questions, ask your nurse or doctor.               Start taking these medicines.        Dose/Directions    amoxicillin-clavulanate 875-125 MG per tablet   Commonly known as:  AUGMENTIN   Used for:  Acute bronchitis, unspecified organism   Started by:  WASHINGTON Dey MD        Dose:  1 tablet   Take 1 tablet by mouth 2 times daily   Quantity:  20 tablet   Refills:  0            Where to get your medicines      These medications were sent to [facility] [address] [place] [address] [address]  [address] [facility] [address]     Phone:  [phone]     amoxicillin-clavulanate 875-125 MG per tablet                Primary Care Provider Office Phone # Fax #    WASHINGTON Dey [phone] [address] [facility] [address]        Equal Access to Services     JAVIER BARRIOS AH: Hadii donavan ku hadasho Soomaali, waaxda luqadaha, qaybta kaalmada adeegyada, waxay idiin hayaan declan polo. So [facility] [place] [facility] [phone].    ATENCIÓN: Si habla español, tiene a elder disposición servicios gratuitos de asistencia lingüística. Llame al [phone].    We comply with applicable federal civil rights laws and [place] laws. We do not discriminate on the basis of race, color, national origin, age, disability, sex, sexual orientation, or gender identity.            Thank you!     Thank you for choosing [facility]  for your care. Our goal is always to provide you with excellent care. Hearing back from our patients is one way we can continue to  improve our services. Please take a few minutes to complete the written survey that you may receive in the mail after your visit with us. Thank you!             Your Updated Medication List - Protect others around you: Learn how to safely use, store and throw away your medicines at www.disposemymeds.org.          This list is accurate as of 9/28/18 11:00 AM.  Always use your most recent med list.                   Brand Name Dispense Instructions for use Diagnosis    albuterol 108 (90 Base) MCG/ACT inhaler    PROAIR HFA/PROVENTIL HFA/VENTOLIN HFA    1 Inhaler    Inhale 2 puffs into the lungs every 6 hours as needed for shortness of breath / dyspnea or wheezing        allopurinol 300 MG tablet    ZYLOPRIM    90 tablet    Take 1 tablet (300 mg) by mouth daily    Idiopathic gout, unspecified chronicity, unspecified site       amoxicillin-clavulanate 875-125 MG per tablet    AUGMENTIN    20 tablet    Take 1 tablet by mouth 2 times daily    Acute bronchitis, unspecified organism       aspirin 81 MG EC tablet   Generic drug:  aspirin      Take 1 tablet by mouth daily.        ATORVASTATIN CALCIUM PO      Take 40 mg by mouth daily        FLOMAX 0.4 MG capsule   Generic drug:  tamsulosin      Take 0.4 mg by mouth daily        INDOMETHACIN PO           multivitamin, therapeutic with minerals Tabs tablet      Take 1 tablet by mouth daily        OCONNOR STOOL SOFTENER PO           UNABLE TO FIND      MEDICATION NAME: medication for prostate. Unknown name        Vitamin D3 2000 units Tabs      Take 1 tablet by mouth daily.

## 2018-09-28 NOTE — TELEPHONE ENCOUNTER
9:14 am    Reason for Call: OVERBOOK    Patient is having the following symptoms: Cough/sinuns congestion for 3 days.    The patient is requesting an appointment for today with Dr ANA Dey or if something could be called into [facility] in [facility]. (hoping to be seen with spouse - see other overbook)    Was an appointment offered for this call? Yes  If yes : Appointment type short              Date 10/12/18    Preferred method for responding to this message: Telephone Call  What is your phone number ? [phone]    If we cannot reach you directly, may we leave a detailed response at the number you provided? Yes    Can this message wait until your PCP/provider returns, if unavailable today? Not applicable    Mita Perera

## 2018-09-28 NOTE — PROGRESS NOTES
SUBJECTIVE:                                                    Dann Espinoza is a 72 year old male who presents to clinic today for the following health issues:      RESPIRATORY SYMPTOMS      Duration: about 3 days    Description  rhinorrhea, cough and conjunctival irritation    Severity: mild    Accompanying signs and symptoms: see above    History (predisposing factors):  none    Precipitating or alleviating factors: None    Therapies tried and outcome:  Nyquil, rest, fluids and cough drops    Non-smoker has had a scratchy throat tried some cough drops give him a little relief eyes been watering a little.  But he is coughing a little more.  His wife is more sick than he is in and she is here today also.  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 Authorizing Provider   albuterol (PROAIR HFA/PROVENTIL HFA/VENTOLIN HFA) 108 (90 BASE) MCG/ACT Inhaler Inhale 2 puffs into the lungs every 6 hours as needed for shortness of breath / dyspnea or wheezing 6/7/17  Yes Luz Marina Lyons APRN FNP   allopurinol (ZYLOPRIM) 300 MG tablet Take 1 tablet (300 mg) by mouth daily 4/12/18  Yes WASHINGTON Dey MD   amoxicillin-clavulanate (AUGMENTIN) 875-125 MG per tablet Take 1 tablet by mouth 2 times daily 9/28/18  Yes WASHINGTON Dey MD   aspirin (ASPIR-81) 81 MG EC tablet  Take 1 tablet by mouth daily.   Yes Reported, Patient   ATORVASTATIN CALCIUM PO Take 40 mg by mouth daily   Yes Reported, Patient   Cholecalciferol (VITAMIN D3) 2000 UNITS TABS Take 1 tablet by mouth daily.   Yes Reported, Patient   Docusate Sodium (OCONNOR STOOL SOFTENER PO)    Yes Reported, Patient   INDOMETHACIN PO    Yes Reported, Patient   multivitamin, therapeutic with minerals (THERA-VIT-M) TABS tablet Take 1 tablet by mouth daily   Yes Reported, Patient   tamsulosin (FLOMAX) 0.4 MG capsule Take 0.4 mg by mouth daily   Yes Reported, Patient   UNABLE TO FIND MEDICATION NAME: medication for prostate. Unknown name   Yes Reported, Patient       ALLERGIES:     Allergies   Allergen Reactions     Azithromycin Rash     He developed a rash when on both Azithromycin and Ceftriaxone- unsure which one may have been the culprit       Ceftriaxone Rash     He developed a rash when he was on Ceftriaxone + Azithromycin     Levaquin [Levofloxacin] Rash       ROS:  Constitutional, HEENT, cardiovascular, pulmonary, gi and gu systems are negative, except as otherwise noted.      EXAM:  /68  Pulse 71  Temp 97.9  F (36.6  C) (Tympanic)  Wt 224 lb (101.6 kg)  SpO2 93%  BMI 32.14 kg/m2 Body mass index is 32.14 kg/(m^2).   GENERAL APPEARANCE: healthy, alert and no distress  EYES: Eyes grossly normal to inspection, PERRL and conjunctivae and sclerae normal  HENT: ear canals and TM's normal and nose and mouth without ulcers or lesions  NECK: no adenopathy, no asymmetry, masses, or scars and thyroid normal to palpation  RESP: lungs clear to auscultation - no rales, rhonchi or wheezes  CV: regular rates and rhythm, normal S1 S2, no S3 or S4 and no murmur, click or rub  NEURO: Normal strength and tone, mentation intact and speech normal  Lab/ X-ray  No results found for this or any previous visit (from the past 24 hour(s)).    ASSESSMENT/PLAN:    ICD-10-CM    1. Acute bronchitis, unspecified organism J20.9  amoxicillin-clavulanate (AUGMENTIN) 875-125 MG per tablet   Has a bronchitis treat with Augmentin get over-the-counter Robitussin to take at night rest push fluids follow-up if symptoms do not resolve after treatment, come in sooner if develops acute symptoms I explained to both patient and his wife if they suddenly become short of breath or weak or cannot keep food down or other severe things either should go to the ER if they get that.      CHEN Dey MD  September 28, 2018

## 2018-09-28 NOTE — NURSING NOTE
"Chief Complaint   Patient presents with     URI       Initial /68  Pulse 71  Temp 97.9  F (36.6  C) (Tympanic)  Wt 224 lb (101.6 kg)  SpO2 93%  BMI 32.14 kg/m2 Estimated body mass index is 32.14 kg/(m^2) as calculated from the following:    Height as of 9/29/17: 5' 10\" (1.778 m).    Weight as of this encounter: 224 lb (101.6 kg).  Medication Reconciliation: complete    Doris Moran MA    "

## 2018-09-29 ASSESSMENT — ANXIETY QUESTIONNAIRES: GAD7 TOTAL SCORE: 2

## 2018-09-29 ASSESSMENT — PATIENT HEALTH QUESTIONNAIRE - PHQ9: SUM OF ALL RESPONSES TO PHQ QUESTIONS 1-9: 1

## 2018-11-20 DIAGNOSIS — M10.00 IDIOPATHIC GOUT, UNSPECIFIED CHRONICITY, UNSPECIFIED SITE: ICD-10-CM

## 2018-11-21 RX ORDER — ALLOPURINOL 300 MG/1
TABLET ORAL
Qty: 90 TABLET | Refills: 0 | Status: SHIPPED | OUTPATIENT
Start: 2018-11-21 | End: 2019-02-12

## 2018-11-21 NOTE — TELEPHONE ENCOUNTER
allopurinol (ZYLOPRIM) 300 MG tablet    Last Written Prescription Date:  4/12/18  Last Fill Quantity: 90,   # refills: 1  Last Office Visit: 9/28/18  Future Office visit:

## 2018-12-13 ENCOUNTER — TRANSFERRED RECORDS (OUTPATIENT)
Dept: HEALTH INFORMATION MANAGEMENT | Facility: CLINIC | Age: 73
End: 2018-12-13

## 2018-12-20 ENCOUNTER — OFFICE VISIT (OUTPATIENT)
Dept: FAMILY MEDICINE | Facility: OTHER | Age: 73
End: 2018-12-20
Attending: FAMILY MEDICINE
Payer: COMMERCIAL

## 2018-12-20 ENCOUNTER — ANCILLARY PROCEDURE (OUTPATIENT)
Dept: GENERAL RADIOLOGY | Facility: OTHER | Age: 73
End: 2018-12-20
Attending: FAMILY MEDICINE
Payer: COMMERCIAL

## 2018-12-20 VITALS
SYSTOLIC BLOOD PRESSURE: 110 MMHG | HEART RATE: 82 BPM | DIASTOLIC BLOOD PRESSURE: 60 MMHG | WEIGHT: 221 LBS | BODY MASS INDEX: 31.71 KG/M2 | TEMPERATURE: 96.8 F | OXYGEN SATURATION: 95 %

## 2018-12-20 DIAGNOSIS — Z12.5 SCREENING FOR PROSTATE CANCER: ICD-10-CM

## 2018-12-20 DIAGNOSIS — M25.552 HIP PAIN, LEFT: ICD-10-CM

## 2018-12-20 DIAGNOSIS — Z86.73 HISTORY OF TIA (TRANSIENT ISCHEMIC ATTACK) AND STROKE: ICD-10-CM

## 2018-12-20 DIAGNOSIS — E78.00 PURE HYPERCHOLESTEROLEMIA: Primary | ICD-10-CM

## 2018-12-20 DIAGNOSIS — Z13.1 SCREENING FOR DIABETES MELLITUS: ICD-10-CM

## 2018-12-20 PROCEDURE — 93010 ELECTROCARDIOGRAM REPORT: CPT | Performed by: INTERNAL MEDICINE

## 2018-12-20 PROCEDURE — 99397 PER PM REEVAL EST PAT 65+ YR: CPT | Mod: 25 | Performed by: FAMILY MEDICINE

## 2018-12-20 PROCEDURE — G0463 HOSPITAL OUTPT CLINIC VISIT: HCPCS

## 2018-12-20 PROCEDURE — 73502 X-RAY EXAM HIP UNI 2-3 VIEWS: CPT | Mod: TC

## 2018-12-20 PROCEDURE — 93005 ELECTROCARDIOGRAM TRACING: CPT

## 2018-12-20 ASSESSMENT — ANXIETY QUESTIONNAIRES
2. NOT BEING ABLE TO STOP OR CONTROL WORRYING: NOT AT ALL
IF YOU CHECKED OFF ANY PROBLEMS ON THIS QUESTIONNAIRE, HOW DIFFICULT HAVE THESE PROBLEMS MADE IT FOR YOU TO DO YOUR WORK, TAKE CARE OF THINGS AT HOME, OR GET ALONG WITH OTHER PEOPLE: NOT DIFFICULT AT ALL
6. BECOMING EASILY ANNOYED OR IRRITABLE: SEVERAL DAYS
7. FEELING AFRAID AS IF SOMETHING AWFUL MIGHT HAPPEN: NOT AT ALL
GAD7 TOTAL SCORE: 2
4. TROUBLE RELAXING: SEVERAL DAYS
1. FEELING NERVOUS, ANXIOUS, OR ON EDGE: NOT AT ALL
3. WORRYING TOO MUCH ABOUT DIFFERENT THINGS: NOT AT ALL
5. BEING SO RESTLESS THAT IT IS HARD TO SIT STILL: NOT AT ALL

## 2018-12-20 ASSESSMENT — PAIN SCALES - GENERAL: PAINLEVEL: EXTREME PAIN (9)

## 2018-12-20 ASSESSMENT — PATIENT HEALTH QUESTIONNAIRE - PHQ9: SUM OF ALL RESPONSES TO PHQ QUESTIONS 1-9: 3

## 2018-12-20 NOTE — PATIENT INSTRUCTIONS
Preventive Health Recommendations:     See your health care provider every year to    Review health changes.     Discuss preventive care.      Review your medicines if your doctor has prescribed any.      Talk with your health care provider about whether you should have a test to screen for prostate cancer (PSA).    Every 3 years, have a diabetes test (fasting glucose). If you are at risk for diabetes, you should have this test more often.    Every 5 years, have a cholesterol test. Have this test more often if you are at risk for high cholesterol or heart disease.     Every 10 years, have a colonoscopy. Or, have a yearly FIT test (stool test). These exams will check for colon cancer.    Talk to with your health care provider about screening for Abdominal Aortic Aneurysm if you have a family history of AAA or have a history of smoking.    Shots:     Get a flu shot each year.     Get a tetanus shot every 10 years.     Talk to your doctor about your pneumonia vaccines. There are now two you should receive - Pneumovax (PPSV 23) and Prevnar (PCV 13).     Talk to your pharmacist about a shingles vaccine.     Talk to your doctor about the hepatitis B vaccine.  Nutrition:     Eat at least 5 servings of fruits and vegetables each day.     Eat whole-grain bread, whole-wheat pasta and brown rice instead of white grains and rice.     Get adequate Calcium and Vitamin D.   Lifestyle    Exercise for at least 150 minutes a week (30 minutes a day, 5 days a week). This will help you control your weight and prevent disease.     Limit alcohol to one drink per day.     No smoking.     Wear sunscreen to prevent skin cancer.    See your dentist every six months for an exam and cleaning.    See your eye doctor every 1 to 2 years to screen for conditions such as glaucoma, macular degeneration, cataracts, etc.    Personalized Prevention Plan  You are due for the preventive services outlined below.  Your care team is available to assist you  in scheduling these services.  If you have already completed any of these items, please share that information with your care team to update in your medical record.  Health Maintenance Due   Topic Date Due     Diptheria Tetanus Pertussis (DTAP/TDAP/TD) Vaccine (1 - Tdap) 10/13/1970     Zoster (Chicken Pox) Vaccine (1 of 2) 10/13/1995     AORTIC ANEURYSM SCREENING (SYSTEM ASSIGNED)  10/13/2010     Colonoscopy - every 3 years  07/28/2011     Flu Vaccine (1) 09/01/2018     Pneumococcal Vaccine (2 of 2 - PCV13) 09/24/2018

## 2018-12-20 NOTE — NURSING NOTE
"Chief Complaint   Patient presents with     Physical       Initial /60 (BP Location: Right arm, Patient Position: Chair, Cuff Size: Adult Large)   Pulse 82   Temp 96.8  F (36  C) (Tympanic)   Wt 100.2 kg (221 lb)   SpO2 95%   BMI 31.71 kg/m   Estimated body mass index is 31.71 kg/m  as calculated from the following:    Height as of 9/29/17: 1.778 m (5' 10\").    Weight as of this encounter: 100.2 kg (221 lb).  Medication Reconciliation: complete    ROSA HAYWARD LPN  "

## 2018-12-20 NOTE — LETTER
[facility]  [address]  [place] [address]  [phone]        March 25, 2019    Dann Espinoza  [address]  [facility] [address]              Dear Dann Espinoza    This is to remind you that your fasting lab is due.    You may call our office at [phone] to schedule an appointment.    Please disregard this notice if you have already had your labs drawn or made an appointment.        Sincerely,        WASHINGTON Dey MD

## 2018-12-20 NOTE — PROGRESS NOTES
SUBJECTIVE:   CC: Dann Espinoza is an 73 year old male who presents for preventive health visit.     Healthy Habits:    Do you get at least three servings of calcium containing foods daily (dairy, green leafy vegetables, etc.)? yes    Amount of exercise or daily activities, outside of work: 7 day(s) per week    Problems taking medications regularly No    Medication side effects: No    Have you had an eye exam in the past two years? yes    Do you see a dentist twice per year? no    Do you have sleep apnea, excessive snoring or daytime drowsiness?no  Patient saw Dr. 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  He was told to come here to discuss this.  Patient has had no further vision changes.  He has no weakness or numbness.  He does have  left hip pain and wants to have that evaluated    Musculoskeletal problem/pain      Duration: 3 years     Description  Location: Left hip     Intensity:  moderate, severe    Accompanying signs and symptoms: radiation of pain to leg    History  Previous similar problem: YES  Previous evaluation:  x-ray, MRI and orthopedic evaluation    Precipitating or alleviating factors:  Trauma or overuse: no   Aggravating factors include: sitting, standing, walking, climbing stairs, exercise and overuse    Therapies tried and outcome: aspirin, injection, ibuprofen, tylenol       Today's PHQ-2 Score: No flowsheet data found.    Abuse: Current or Past(Physical, Sexual or Emotional)- No  Do you feel safe in your environment? Yes    Social History     Tobacco Use     Smoking status: Former Smoker     Smokeless tobacco: Never Used   Substance Use Topics     Alcohol use: No     If you drink alcohol do you typically have >3 drinks per day or >7 drinks per week? No                      Last PSA:   PSA   Date Value Ref Range Status    10/06/2016 2.70 0 - 4 ug/L Final     Comment:     Assay Method:  Chemiluminescence using Siemens Vista analyzer       Reviewed orders with patient. Reviewed health maintenance and updated orders accordingly - Yes  BP Readings from Last 3 Encounters:   12/20/18 110/60   09/28/18 118/68   05/31/18 130/64    Wt Readings from Last 3 Encounters:   12/20/18 100.2 kg (221 lb)   09/28/18 101.6 kg (224 lb)   05/31/18 102.5 kg (226 lb)                  Patient Active Problem List   Diagnosis     ACP (advance care planning)     Lumbago     Asbestosis (H)     Gout     Hyperlipidemia     Decreased hearing     Dizziness     ASNHL (asymmetrical sensorineural hearing loss)     Impacted cerumen     Aortic root dilation (H)     Duane's syndrome of left eye     Constipation     Coxitis     Elevated prostate specific antigen (PSA)     Vitreous floaters     Vitreous hemorrhage (H)     Vitreous degeneration     Osteoarthritis of hip     Pneumonia of right lower lobe due to infectious organism (H)     Sepsis, due to unspecified organism (H)     Pneumonia     Past Surgical History:   Procedure Laterality Date     COLONOSCOPY  07-    Rectal Bleeding > Repeat 3 years (2011)     excision of skin cancer       HEMORRHOIDECTOMY         Social History     Tobacco Use     Smoking status: Former Smoker     Smokeless tobacco: Never Used   Substance Use Topics     Alcohol use: No     Family History   Problem Relation Age of Onset     Crohn's Disease Father      Cancer Father 86        Liver - Cause of Death     Heart Disease Father      Heart Disease Brother      Diabetes Brother 49        Cause of Death     Cerebrovascular Disease Mother         CVA     Other - See Comments Mother         AAA     Heart Disease Mother      Heart Disease Sister          Current Outpatient Medications   Medication Sig Dispense Refill     albuterol (PROAIR HFA/PROVENTIL HFA/VENTOLIN HFA) 108 (90 BASE) MCG/ACT Inhaler Inhale 2 puffs into the lungs every 6 hours  as needed for shortness of breath / dyspnea or wheezing 1 Inhaler 0     allopurinol (ZYLOPRIM) 300 MG tablet TAKE 1 TABLET BY MOUTH ONCE DAILY 90 tablet 0     aspirin (ASPIR-81) 81 MG EC tablet Take 1 tablet by mouth daily.       ATORVASTATIN CALCIUM PO Take 40 mg by mouth daily       Cholecalciferol (VITAMIN D3) 2000 UNITS TABS Take 1 tablet by mouth daily.       Docusate Sodium (OCONNOR STOOL SOFTENER PO)        INDOMETHACIN PO        multivitamin, therapeutic with minerals (THERA-VIT-M) TABS tablet Take 1 tablet by mouth daily       tamsulosin (FLOMAX) 0.4 MG capsule Take 0.4 mg by mouth daily       UNABLE TO FIND MEDICATION NAME: medication for prostate. Unknown name       Allergies   Allergen Reactions     Azithromycin Rash     He developed a rash when on both Azithromycin and Ceftriaxone- unsure which one may have been the culprit       Ceftriaxone Rash     He developed a rash when he was on Ceftriaxone + Azithromycin     Levaquin [Levofloxacin] Rash       Reviewed and updated as needed this visit by clinical staff  Tobacco  Allergies  Meds  Med Hx  Surg Hx  Fam Hx  Soc Hx        Reviewed and updated as needed this visit by Provider            ROS:  CONSTITUTIONAL: NEGATIVE for fever, chills, change in weight  INTEGUMENTARY/SKIN: NEGATIVE for worrisome rashes, moles or lesions  EYES: NEGATIVE for vision changes or irritation  ENT: NEGATIVE for ear, mouth and throat problems  RESP: NEGATIVE for significant cough or SOB  CV: NEGATIVE for chest pain, palpitations or peripheral edema  GI: NEGATIVE for nausea, abdominal pain, heartburn, or change in bowel habits   male: negative for dysuria, hematuria, decreased urinary stream, erectile dysfunction, urethral discharge  MUSCULOSKELETAL: NEGATIVE for significant arthralgias or myalgia  NEURO: NEGATIVE for weakness, dizziness or paresthesias  PSYCHIATRIC: NEGATIVE for changes in mood or affect    OBJECTIVE:   /60 (BP Location: Right arm, Patient  Position: Chair, Cuff Size: Adult Large)   Pulse 82   Temp 96.8  F (36  C) (Tympanic)   Wt 100.2 kg (221 lb)   SpO2 95%   BMI 31.71 kg/m    EXAM:  GENERAL: healthy, alert and no distress  EYES: Eyes grossly normal to inspection, PERRL and conjunctivae and sclerae normal  HENT: ear canals and TM's normal, nose and mouth without ulcers or lesions  NECK: no adenopathy, no asymmetry, masses, or scars and thyroid normal to palpation  RESP: lungs clear to auscultation - no rales, rhonchi or wheezes  CV: regular rate and rhythm, normal S1 S2, no S3 or S4, no murmur, click or rub, no peripheral edema and peripheral pulses strong  ABDOMEN: soft, nontender, no hepatosplenomegaly, no masses and bowel sounds normal   (male): normal male genitalia without lesions or urethral discharge, no hernia  RECTAL: deferred  MS: no gross musculoskeletal defects noted, no edema  SKIN: no suspicious lesions or rashes  NEURO: Normal strength and tone, mentation intact and speech normal  PSYCH: mentation appears normal, affect normal/bright  LYMPH: no cervical, supraclavicular, axillary, or inguinal adenopathy    Diagnostic Test Results:  EKG preliminary sinus rhythm no ST-T wave change in left hip x-ray shows significant arthritic changes    ASSESSMENT/PLAN:       ICD-10-CM    1. Pure hypercholesterolemia E78.00 Lipid Profile (Chol, Trig, HDL, LDL calc)     AST   2. Screening for prostate cancer Z12.5 PSA, screen   3. Screening for diabetes mellitus Z13.1 Basic metabolic panel   4. Hip pain, left M25.552 XR Hip Left 2-3 Views   5. History of TIA (transient ischemic attack) and stroke Z86.73 EKG 12-lead complete w/read - Clinics     US Carotid Bilateral   Patient has eaten today so will come in fasting we will do lipids follow-up hypercholesterolemia and check a PSA screening for prostate cancer and get a BMP check for diabetes.  For the hip pain x-ray was done today and will refer him to Ortho once he gets his carotid Doppler if that  "shows abnormality he will need to see a surgeon for that but if that is negative then will refer to Ortho.  History of TIA get an EKG today and check a carotid ultrasound.  He had an ultrasound 2 years ago and he states that he thought it was normal.    COUNSELING:  Reviewed preventive health counseling, as reflected in patient instructions       Regular exercise       Healthy diet/nutrition       Vision screening       Colon cancer screening       Prostate cancer screening    BP Readings from Last 1 Encounters:   12/20/18 110/60     Estimated body mass index is 31.71 kg/m  as calculated from the following:    Height as of 9/29/17: 1.778 m (5' 10\").    Weight as of this encounter: 100.2 kg (221 lb).           reports that he has quit smoking. he has never used smokeless tobacco.      Counseling Resources:  ATP IV Guidelines  Pooled Cohorts Equation Calculator  FRAX Risk Assessment  ICSI Preventive Guidelines  Dietary Guidelines for Americans, 2010  USDA's MyPlate  ASA Prophylaxis  Lung CA Screening    R Navneet Dey MD  [facility] - [place]  "

## 2018-12-21 ENCOUNTER — HOSPITAL ENCOUNTER (OUTPATIENT)
Dept: ULTRASOUND IMAGING | Facility: HOSPITAL | Age: 73
Discharge: HOME | End: 2018-12-21
Attending: FAMILY MEDICINE | Admitting: FAMILY MEDICINE
Payer: COMMERCIAL

## 2018-12-21 DIAGNOSIS — Z86.73 HISTORY OF TIA (TRANSIENT ISCHEMIC ATTACK) AND STROKE: ICD-10-CM

## 2018-12-21 PROCEDURE — 93880 EXTRACRANIAL BILAT STUDY: CPT | Mod: TC

## 2018-12-21 ASSESSMENT — ANXIETY QUESTIONNAIRES: GAD7 TOTAL SCORE: 2

## 2018-12-26 ENCOUNTER — TELEPHONE (OUTPATIENT)
Dept: FAMILY MEDICINE | Facility: OTHER | Age: 73
End: 2018-12-26

## 2018-12-26 DIAGNOSIS — M25.552 HIP PAIN, LEFT: Primary | ICD-10-CM

## 2018-12-26 NOTE — TELEPHONE ENCOUNTER
Ortho referral    Stephanie pt wanting confirmation his ortho referral will be scheduled. Pt reported pcp wanted to wait on US result first.

## 2018-12-27 NOTE — TELEPHONE ENCOUNTER
Ortho referral pending per Dr. Navneet Dey.. Patient requesting [facility]   ROSA HAYWARD

## 2019-01-27 ENCOUNTER — HOSPITAL ENCOUNTER (EMERGENCY)
Facility: HOSPITAL | Age: 74
Discharge: HOME | End: 2019-01-27
Attending: EMERGENCY MEDICINE | Admitting: EMERGENCY MEDICINE
Payer: COMMERCIAL

## 2019-01-27 ENCOUNTER — APPOINTMENT (OUTPATIENT)
Dept: CT IMAGING | Facility: HOSPITAL | Age: 74
End: 2019-01-27
Payer: COMMERCIAL

## 2019-01-27 VITALS
TEMPERATURE: 96.8 F | RESPIRATION RATE: 12 BRPM | DIASTOLIC BLOOD PRESSURE: 96 MMHG | SYSTOLIC BLOOD PRESSURE: 157 MMHG | OXYGEN SATURATION: 97 %

## 2019-01-27 DIAGNOSIS — H81.13 BENIGN PAROXYSMAL POSITIONAL VERTIGO DUE TO BILATERAL VESTIBULAR DISORDER: Primary | ICD-10-CM

## 2019-01-27 LAB
ALBUMIN SERPL-MCNC: 3.6 G/DL (ref 3.4–5)
ALP SERPL-CCNC: 116 U/L (ref 40–150)
ALT SERPL W P-5'-P-CCNC: 38 U/L (ref 0–70)
ANION GAP SERPL CALCULATED.3IONS-SCNC: 5 MMOL/L (ref 3–14)
AST SERPL W P-5'-P-CCNC: 21 U/L (ref 0–45)
BASOPHILS # BLD AUTO: 0 10E9/L (ref 0–0.2)
BASOPHILS NFR BLD AUTO: 0.4 %
BILIRUB SERPL-MCNC: 0.5 MG/DL (ref 0.2–1.3)
BUN SERPL-MCNC: 20 MG/DL (ref 7–30)
CALCIUM SERPL-MCNC: 9.4 MG/DL (ref 8.5–10.1)
CHLORIDE SERPL-SCNC: 106 MMOL/L (ref 94–109)
CO2 SERPL-SCNC: 29 MMOL/L (ref 20–32)
CREAT SERPL-MCNC: 1.02 MG/DL (ref 0.66–1.25)
D DIMER PPP DDU-MCNC: <200 NG/ML D-DU (ref 0–300)
DIFFERENTIAL METHOD BLD: ABNORMAL
EOSINOPHIL # BLD AUTO: 0 10E9/L (ref 0–0.7)
EOSINOPHIL NFR BLD AUTO: 0.6 %
ERYTHROCYTE [DISTWIDTH] IN BLOOD BY AUTOMATED COUNT: 14.2 % (ref 10–15)
GFR SERPL CREATININE-BSD FRML MDRD: 72 ML/MIN/{1.73_M2}
GLUCOSE SERPL-MCNC: 114 MG/DL (ref 70–99)
HCT VFR BLD AUTO: 40.5 % (ref 40–53)
HGB BLD-MCNC: 14 G/DL (ref 13.3–17.7)
IMM GRANULOCYTES # BLD: 0 10E9/L (ref 0–0.4)
IMM GRANULOCYTES NFR BLD: 0.6 %
LACTATE BLD-SCNC: 0.8 MMOL/L (ref 0.7–2)
LYMPHOCYTES # BLD AUTO: 0.7 10E9/L (ref 0.8–5.3)
LYMPHOCYTES NFR BLD AUTO: 10 %
MCH RBC QN AUTO: 30.4 PG (ref 26.5–33)
MCHC RBC AUTO-ENTMCNC: 34.6 G/DL (ref 31.5–36.5)
MCV RBC AUTO: 88 FL (ref 78–100)
MONOCYTES # BLD AUTO: 0.5 10E9/L (ref 0–1.3)
MONOCYTES NFR BLD AUTO: 6.8 %
NEUTROPHILS # BLD AUTO: 5.9 10E9/L (ref 1.6–8.3)
NEUTROPHILS NFR BLD AUTO: 81.6 %
NRBC # BLD AUTO: 0 10*3/UL
NRBC BLD AUTO-RTO: 0 /100
PLATELET # BLD AUTO: 217 10E9/L (ref 150–450)
POTASSIUM SERPL-SCNC: 4 MMOL/L (ref 3.4–5.3)
PROT SERPL-MCNC: 7.2 G/DL (ref 6.8–8.8)
RBC # BLD AUTO: 4.61 10E12/L (ref 4.4–5.9)
SODIUM SERPL-SCNC: 140 MMOL/L (ref 133–144)
TROPONIN I SERPL-MCNC: <0.015 UG/L (ref 0–0.04)
WBC # BLD AUTO: 7.2 10E9/L (ref 4–11)

## 2019-01-27 PROCEDURE — 84484 ASSAY OF TROPONIN QUANT: CPT | Performed by: EMERGENCY MEDICINE

## 2019-01-27 PROCEDURE — 70450 CT HEAD/BRAIN W/O DYE: CPT | Mod: TC

## 2019-01-27 PROCEDURE — 80053 COMPREHEN METABOLIC PANEL: CPT | Performed by: EMERGENCY MEDICINE

## 2019-01-27 PROCEDURE — 99284 EMERGENCY DEPT VISIT MOD MDM: CPT | Mod: Z6 | Performed by: EMERGENCY MEDICINE

## 2019-01-27 PROCEDURE — 36415 COLL VENOUS BLD VENIPUNCTURE: CPT | Performed by: EMERGENCY MEDICINE

## 2019-01-27 PROCEDURE — 93005 ELECTROCARDIOGRAM TRACING: CPT

## 2019-01-27 PROCEDURE — 99285 EMERGENCY DEPT VISIT HI MDM: CPT | Mod: 25

## 2019-01-27 PROCEDURE — 25000132 ZZH RX MED GY IP 250 OP 250 PS 637: Performed by: EMERGENCY MEDICINE

## 2019-01-27 PROCEDURE — 85379 FIBRIN DEGRADATION QUANT: CPT | Performed by: EMERGENCY MEDICINE

## 2019-01-27 PROCEDURE — 85025 COMPLETE CBC W/AUTO DIFF WBC: CPT | Performed by: EMERGENCY MEDICINE

## 2019-01-27 PROCEDURE — 93010 ELECTROCARDIOGRAM REPORT: CPT | Performed by: INTERNAL MEDICINE

## 2019-01-27 PROCEDURE — 83605 ASSAY OF LACTIC ACID: CPT | Performed by: EMERGENCY MEDICINE

## 2019-01-27 RX ORDER — MECLIZINE HYDROCHLORIDE 25 MG/1
25 TABLET ORAL 3 TIMES DAILY PRN
Qty: 30 TABLET | Refills: 0 | Status: SHIPPED | OUTPATIENT
Start: 2019-01-27 | End: 2019-04-11

## 2019-01-27 RX ORDER — MECLIZINE HYDROCHLORIDE 25 MG/1
25 TABLET ORAL ONCE
Status: COMPLETED | OUTPATIENT
Start: 2019-01-27 | End: 2019-01-27

## 2019-01-27 RX ADMIN — MECLIZINE 25 MG: 25 TABLET ORAL at 12:23

## 2019-01-27 ASSESSMENT — ENCOUNTER SYMPTOMS
SHORTNESS OF BREATH: 0
FEVER: 0
ABDOMINAL PAIN: 0

## 2019-01-27 NOTE — ED AVS SNAPSHOT
[facility]  [address]  [place] [address]  Phone:  [phone]                                    Dann Espinoza   MRN: [id number]    Department:  [facility]   Date of Visit:  1/27/2019           After Visit Summary Signature Page    I have received my discharge instructions, and my questions have been answered. I have discussed any challenges I see with this plan with the nurse or doctor.    ..........................................................................................................................................  Patient/Patient Representative Signature      ..........................................................................................................................................  Patient Representative Print Name and Relationship to Patient    ..................................................               ................................................  Date                                   Time    ..........................................................................................................................................  Reviewed by Signature/Title    ...................................................              ..............................................  Date                                               Time          22EPIC Rev 08/18

## 2019-01-27 NOTE — ED NOTES
Pt presents with spouse. States dizziness once yesterday morning, but resolved through the day, today dizziness again, but not going away. Worse with movement.

## 2019-01-27 NOTE — ED PROVIDER NOTES
History     Chief Complaint   Patient presents with     Dizziness     c/o dizziness ad nausea. notes x1 yesterday am. notes started again this am.     HPI  Dann Espinoza is a 73 year old male who presents to the emergency department with complaints of dizziness since yesterday morning the dizziness is associated with a sensation of spinning around.  It is worsened by change in position of the head like moving head laterally or sideways.  He feels nauseated but has not vomited.  Denies any fever, recent URI symptoms, ear pain or stuffiness, ear discharge, headache, unilateral body weakness, neck pain or stiffness, chest pain or palpitations.  Patient has never had similar symptoms in the past.  The symptoms seem to improve when he sits down quietly or lies flat on the bed on his back.  Any kind of movement seems to make them worse.    Allergies:  Allergies   Allergen Reactions     Azithromycin Rash     He developed a rash when on both Azithromycin and Ceftriaxone- unsure which one may have been the culprit       Ceftriaxone Rash     He developed a rash when he was on Ceftriaxone + Azithromycin     Levaquin [Levofloxacin] Rash       Problem List:    Patient Active Problem List    Diagnosis Date Noted     Pneumonia of right lower lobe due to infectious organism (H) 09/21/2017     Priority: Medium     Sepsis, due to unspecified organism (H) 09/21/2017     Priority: Medium     Pneumonia 09/21/2017     Priority: Medium     Aortic root dilation (H) 05/23/2016     Priority: Medium     Osteoarthritis of hip 04/01/2016     Priority: Medium     Coxitis 03/30/2016     Priority: Medium     Vitreous hemorrhage (H) 07/09/2015     Priority: Medium     Duane's syndrome of left eye 06/03/2015     Priority: Medium     Vitreous floaters 06/03/2015     Priority: Medium     Vitreous degeneration 06/03/2015     Priority: Medium     ASNHL (asymmetrical sensorineural hearing loss) 02/28/2014     Priority: Medium     Impacted cerumen  02/28/2014     Priority: Medium     Decreased hearing 01/29/2014     Priority: Medium     Dizziness 01/29/2014     Priority: Medium     ACP (advance care planning) 11/20/2012     Priority: Medium     Advance Care Planning 1/6/2017: ACP Review of Chart / Resources Provided:  Reviewed chart for advance care plan.  Dann Espinoza has no plan or code status on file. Discussed available resources and provided with information. Confirmed code status reflects current choices pending further ACP discussions.  Confirmed/documented legally designated decision makers.  Added by ROSA HAYWARD             Elevated prostate specific antigen (PSA) 06/01/2011     Priority: Medium     Overview:   IMO Update 10/11       Lumbago 01/12/2011     Priority: Medium     Asbestosis (H) 01/12/2011     Priority: Medium     Problem list name updated by automated process. Provider to review       Hyperlipidemia 01/12/2011     Priority: Medium     Overview:   IMO Update 10/11  Problem list name updated by automated process. Provider to review       Constipation 04/16/2004     Priority: Medium     Overview:   Occasional, then gets some bright red blood per rectum   IMO Update       Gout 01/17/2003     Priority: Medium     Overview:   Hx of  Problem list name updated by automated process. Provider to review          Past Medical History:    Past Medical History:   Diagnosis Date     Asbestosis(501) 01/12/2011     Gout, unspecified 01/17/2003     Lumbago 01/12/2011     Other and unspecified hyperlipidemia 01/12/2011     Psychosexual dysfunction, unspecified 01/12/2011       Past Surgical History:    Past Surgical History:   Procedure Laterality Date     COLONOSCOPY  07-    Rectal Bleeding > Repeat 3 years (2011)     excision of skin cancer       HEMORRHOIDECTOMY         Family History:    Family History   Problem Relation Age of Onset     Crohn's Disease Father      Cancer Father 86        Liver - Cause of Death     Heart Disease Father       Heart Disease Brother      Diabetes Brother 49        Cause of Death     Cerebrovascular Disease Mother         CVA     Other - See Comments Mother         AAA     Heart Disease Mother      Heart Disease Sister        Social History:  Marital Status:   [2]  Social History     Tobacco Use     Smoking status: Former Smoker     Smokeless tobacco: Never Used   Substance Use Topics     Alcohol use: No     Drug use: No        Medications:      allopurinol (ZYLOPRIM) 300 MG tablet   aspirin (ASPIR-81) 81 MG EC tablet   ATORVASTATIN CALCIUM PO   Docusate Sodium (OCONNOR STOOL SOFTENER PO)   meclizine (ANTIVERT) 25 MG tablet   tamsulosin (FLOMAX) 0.4 MG capsule   albuterol (PROAIR HFA/PROVENTIL HFA/VENTOLIN HFA) 108 (90 BASE) MCG/ACT Inhaler   Cholecalciferol (VITAMIN D3) 2000 UNITS TABS   INDOMETHACIN PO   multivitamin, therapeutic with minerals (THERA-VIT-M) TABS tablet         Review of Systems   Constitutional: Negative for fever.   Respiratory: Negative for shortness of breath.    Cardiovascular: Negative for chest pain.   Gastrointestinal: Negative for abdominal pain.   All other systems reviewed and are negative.      Physical Exam   BP: 142/86  Heart Rate: 68  Temp: 96.8  F (36  C)  Resp: 16  SpO2: 96 %      Physical Exam   Constitutional: He is oriented to person, place, and time. He appears well-developed and well-nourished. No distress.   HENT:   Head: Normocephalic and atraumatic.   Mouth/Throat: Oropharynx is clear and moist.   Eyes: EOM are normal. Pupils are equal, round, and reactive to light.   Cardiovascular: Normal rate, regular rhythm, normal heart sounds and intact distal pulses.   Pulmonary/Chest: Effort normal and breath sounds normal. No stridor. No respiratory distress.   Abdominal: Soft. Bowel sounds are normal. He exhibits no distension. There is no tenderness.   Musculoskeletal: He exhibits no edema, tenderness or deformity.   Neurological: He is alert and oriented to person, place,  and time. No cranial nerve deficit.   Skin: He is not diaphoretic.   Nursing note and vitals reviewed.      ED Course        Procedures         EKG Interpretation:      Interpreted by Chavo Mcpherson  Time reviewed: 12:20 PM  Symptoms at time of EKG: Dizziness  Rhythm: normal sinus   Rate: normal  Axis: normal  Ectopy: none  Conduction: normal  ST Segments/ T Waves: No ST-T wave changes  Q Waves: none  Comparison to prior: No old EKG available    Clinical Impression: normal EKG            Results for orders placed or performed during the hospital encounter of 01/27/19 (from the past 24 hour(s))   CBC with platelets differential   Result Value Ref Range    WBC 7.2 4.0 - 11.0 10e9/L    RBC Count 4.61 4.4 - 5.9 10e12/L    Hemoglobin 14.0 13.3 - 17.7 g/dL    Hematocrit 40.5 40.0 - 53.0 %    MCV 88 78 - 100 fl    MCH 30.4 26.5 - 33.0 pg    MCHC 34.6 31.5 - 36.5 g/dL    RDW 14.2 10.0 - 15.0 %    Platelet Count 217 150 - 450 10e9/L    Diff Method Automated Method     % Neutrophils 81.6 %    % Lymphocytes 10.0 %    % Monocytes 6.8 %    % Eosinophils 0.6 %    % Basophils 0.4 %    % Immature Granulocytes 0.6 %    Nucleated RBCs 0 0 /100    Absolute Neutrophil 5.9 1.6 - 8.3 10e9/L    Absolute Lymphocytes 0.7 (L) 0.8 - 5.3 10e9/L    Absolute Monocytes 0.5 0.0 - 1.3 10e9/L    Absolute Eosinophils 0.0 0.0 - 0.7 10e9/L    Absolute Basophils 0.0 0.0 - 0.2 10e9/L    Abs Immature Granulocytes 0.0 0 - 0.4 10e9/L    Absolute Nucleated RBC 0.0    Comprehensive metabolic panel   Result Value Ref Range    Sodium 140 133 - 144 mmol/L    Potassium 4.0 3.4 - 5.3 mmol/L    Chloride 106 94 - 109 mmol/L    Carbon Dioxide 29 20 - 32 mmol/L    Anion Gap 5 3 - 14 mmol/L    Glucose 114 (H) 70 - 99 mg/dL    Urea Nitrogen 20 7 - 30 mg/dL    Creatinine 1.02 0.66 - 1.25 mg/dL    GFR Estimate 72 >60 mL/min/[1.73_m2]    GFR Estimate If Black 84 >60 mL/min/[1.73_m2]    Calcium 9.4 8.5 - 10.1 mg/dL    Bilirubin Total 0.5 0.2 - 1.3 mg/dL    Albumin 3.6 3.4  - 5.0 g/dL    Protein Total 7.2 6.8 - 8.8 g/dL    Alkaline Phosphatase 116 40 - 150 U/L    ALT 38 0 - 70 U/L    AST 21 0 - 45 U/L   D-Dimer ([facility])   Result Value Ref Range    D-Dimer ng/mL <200 0 - 300 ng/ml D-DU   Lactic acid whole blood   Result Value Ref Range    Lactic Acid 0.8 0.7 - 2.0 mmol/L   Troponin I   Result Value Ref Range    Troponin I ES <0.015 0.000 - 0.045 ug/L   CT Head w/o Contrast    Narrative    PROCEDURE: CT HEAD W/O CONTRAST     HISTORY: Ataxia, stroke suspected.    COMPARISON: None.    TECHNIQUE:  Helical images of the head from the foramen magnum to the  vertex were obtained without contrast.    FINDINGS: The ventricles and sulci are prominent, slightly more  pronounced in the cerebellum, compatible with mild volume loss. No  acute intracranial hemorrhage, mass effect, midline shift,  hydrocephalus or basilar cystern effacement are present.    The grey-white matter interface is preserved.     The calvarium is intact. The mastoid air cells are clear.        Impression    IMPRESSION: No CT evidence of an acute intracranial process.      KEYSHAWN JI MD       Medications   meclizine (ANTIVERT) tablet 25 mg (25 mg Oral Given 1/27/19 1223)       Assessments & Plan (with Medical Decision Making)   BPPV: Evaluated at the ED and started on meclizine.  Will be discharged home on the same.  Normal CT scan of the head, CBC, CMP, troponin, lactic acid and d-dimer.  Follow-up with PCP if symptoms persist beyond the next 1 week.  May require referral to physical therapy for Epley's maneuvers if symptoms do not resolve within the next week.  Discharged home in stable condition and advised to push fluids.    I have reviewed the nursing notes.    I have reviewed the findings, diagnosis, plan and need for follow up with the patient.       Medication List      Started    meclizine 25 MG tablet  Commonly known as:  ANTIVERT  25 mg, Oral, 3 TIMES DAILY PRN            Final diagnoses:   Benign paroxysmal  positional vertigo due to bilateral vestibular disorder       1/27/2019   [facility]     Chavo Mcpherson MD  01/27/19 1055

## 2019-02-12 DIAGNOSIS — M10.00 IDIOPATHIC GOUT, UNSPECIFIED CHRONICITY, UNSPECIFIED SITE: ICD-10-CM

## 2019-02-13 RX ORDER — ALLOPURINOL 300 MG/1
TABLET ORAL
Qty: 90 TABLET | Refills: 1 | Status: SHIPPED | OUTPATIENT
Start: 2019-02-13 | End: 2019-08-26

## 2019-02-13 NOTE — TELEPHONE ENCOUNTER
allopurinol (ZYLOPRIM) 300 MG tablet  Last Written Prescription Date:  11/21/18  Last Fill Quantity: 90,   # refills: 0  Last Office Visit: 12/20/18  Future Office visit:

## 2019-04-02 ENCOUNTER — TRANSFERRED RECORDS (OUTPATIENT)
Dept: HEALTH INFORMATION MANAGEMENT | Facility: CLINIC | Age: 74
End: 2019-04-02

## 2019-04-08 NOTE — PROGRESS NOTES
[facility] [place]  [address]  [place] [address]  [phone]  Dept: [phone]    PRE-OP EVALUATION:  Today's date: 2019    Dann Espinoza (: 1945) presents for pre-operative evaluation assessment as requested by Dr. Deutsch.  He requires evaluation and anesthesia risk assessment prior to undergoing surgery/procedure for treatment of Hip Replacement .    Proposed Surgery/ Procedure:Left hip replacement  Date of Surgery/ Procedure: 4/15/2019  Time of Surgery/ Procedure: Unknown  Hospital/Surgical Facility: [facility]  Primary Physician: WASHINGTON Dey  Type of Anesthesia Anticipated: to be determined    Patient has a Health Care Directive or Living Will:  NO    1. YES- Do you have a history of heart attack, stroke, stent, bypass or surgery on an artery in the head, neck, heart or legs? Stroke  2. NO - Do you ever have any pain or discomfort in your chest?  3. NO - Do you have a history of  Heart Failure?  4. YES - Are you troubled by shortness of breath when: walking on the level, up a slight hill or at night?  5. NO - Do you currently have a cold, bronchitis or other respiratory infection?  6. NO - Do you have a cough, shortness of breath or wheezing?  7. NO - Do you sometimes get pains in the calves of your legs when you walk?  8. YES - Do you or anyone in your family have previous history of blood clots? Father  9. NO - Do you or does anyone in your family have a serious bleeding problem such as prolonged bleeding following surgeries or cuts?  10. NO - Have you ever had problems with anemia or been told to take iron pills?  11. NO - Have you had any abnormal blood loss such as black, tarry or bloody stools, or abnormal vaginal bleeding?  12. NO - Have you ever had a blood transfusion?  13. NO - Have you or any of your relatives ever had problems with anesthesia?  14. NO - Do you have sleep apnea, excessive snoring or daytime drowsiness?  15. NO - Do you have any  prosthetic heart valves?  16. NO - Do you have prosthetic joints?  17. NO - Is there any chance that you may be pregnant?      HPI:     HPI related to upcoming procedure: Left total hip      See problem list for active medical problems.  Problems all longstanding and stable, except as noted/documented.  See ROS for pertinent symptoms related to these conditions.                                                                                                                                                          .    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  Dann Espinoza has no plan or code status on file. Discussed available resources and provided with information. Confirmed code status reflects current choices pending further ACP discussions.  Confirmed/documented legally designated decision makers.  Added by ROSA HAYWARD             Elevated prostate  specific antigen (PSA) 06/01/2011     Priority: Medium     Overview:   IMO Update 10/11       Lumbago 01/12/2011     Priority: Medium     Asbestosis (H) 01/12/2011     Priority: Medium     Problem list name updated by automated process. Provider to review       Hyperlipidemia 01/12/2011     Priority: Medium     Overview:   IMO Update 10/11  Problem list name updated by automated process. Provider to review       Constipation 04/16/2004     Priority: Medium     Overview:   Occasional, then gets some bright red blood per rectum   IMO Update       Gout 01/17/2003     Priority: Medium     Overview:   Hx of  Problem list name updated by automated process. Provider to review        Past Medical History:   Diagnosis Date     Asbestosis(501) 01/12/2011     Gout, unspecified 01/17/2003     Lumbago 01/12/2011    4 crushed lumbar     Other and unspecified hyperlipidemia 01/12/2011     Psychosexual dysfunction, unspecified 01/12/2011     Past Surgical History:   Procedure Laterality Date     COLONOSCOPY  07-    Rectal Bleeding > Repeat 3 years (2011)     excision of skin cancer       HEMORRHOIDECTOMY       Current Outpatient Medications   Medication Sig Dispense Refill     albuterol (PROAIR HFA/PROVENTIL HFA/VENTOLIN HFA) 108 (90 BASE) MCG/ACT Inhaler Inhale 2 puffs into the lungs every 6 hours as needed for shortness of breath / dyspnea or wheezing 1 Inhaler 0     allopurinol (ZYLOPRIM) 300 MG tablet TAKE 1 TABLET BY MOUTH ONCE DAILY 90 tablet 1     aspirin (ASPIR-81) 81 MG EC tablet Take 1 tablet by mouth daily.       ATORVASTATIN CALCIUM PO Take 40 mg by mouth daily       Cholecalciferol (VITAMIN D3) 2000 UNITS TABS Take 1 tablet by mouth daily.       Docusate Sodium (OCONNOR STOOL SOFTENER PO)        INDOMETHACIN PO        multivitamin, therapeutic with minerals (THERA-VIT-M) TABS tablet Take 1 tablet by mouth daily       tamsulosin (FLOMAX) 0.4 MG capsule Take 0.4 mg by mouth daily       OTC products:  "Aspirin    Allergies   Allergen Reactions     Azithromycin Rash     He developed a rash when on both Azithromycin and Ceftriaxone- unsure which one may have been the culprit       Ceftriaxone Rash     He developed a rash when he was on Ceftriaxone + Azithromycin     Levaquin [Levofloxacin] Rash      Latex Allergy: NO    Social History     Tobacco Use     Smoking status: Former Smoker     Smokeless tobacco: Never Used   Substance Use Topics     Alcohol use: No     History   Drug Use No       REVIEW OF SYSTEMS:   Constitutional, neuro, ENT, endocrine, pulmonary, cardiac, gastrointestinal, genitourinary, musculoskeletal, integument and psychiatric systems are negative, except as otherwise noted.    EXAM:   /72 (BP Location: Left arm, Patient Position: Chair, Cuff Size: Adult Large)   Pulse 68   Temp 96.9  F (36.1  C) (Tympanic)   Ht 1.778 m (5' 10\")   Wt 101.6 kg (224 lb)   SpO2 94%   BMI 32.14 kg/m      GENERAL APPEARANCE: healthy, alert and no distress     EYES: EOMI,  PERRL     HENT: ear canals and TM's normal and nose and mouth without ulcers or lesions     NECK: no adenopathy, no asymmetry, masses, or scars and thyroid normal to palpation     RESP: lungs clear to auscultation - no rales, rhonchi or wheezes     CV: regular rates and rhythm, normal S1 S2, no S3 or S4 and no murmur, click or rub     ABDOMEN:  soft, nontender, no HSM or masses and bowel sounds normal     Rectal exam: deferred     MS: extremities normal- no gross deformities noted, no evidence of inflammation in joints, FROM in all extremities.     SKIN: no suspicious lesions or rashes     NEURO: Normal strength and tone, sensory exam grossly normal, mentation intact and speech normal     PSYCH: mentation appears normal. and affect normal/bright     LYMPHATICS: No cervical adenopathy    DIAGNOSTICS:     Results for orders placed or performed in visit on 04/11/19   CBC with platelets and differential   Result Value Ref Range    WBC 6.3 4.0 - " 11.0 10e9/L    RBC Count 4.68 4.4 - 5.9 10e12/L    Hemoglobin 14.2 13.3 - 17.7 g/dL    Hematocrit 40.6 40.0 - 53.0 %    MCV 87 78 - 100 fl    MCH 30.3 26.5 - 33.0 pg    MCHC 35.0 31.5 - 36.5 g/dL    RDW 14.1 10.0 - 15.0 %    Platelet Count 217 150 - 450 10e9/L    Diff Method Automated Method     % Neutrophils 72.4 %    % Lymphocytes 17.3 %    % Monocytes 7.9 %    % Eosinophils 1.9 %    % Basophils 0.3 %    % Immature Granulocytes 0.2 %    Nucleated RBCs 0 0 /100    Absolute Neutrophil 4.6 1.6 - 8.3 10e9/L    Absolute Lymphocytes 1.1 0.8 - 5.3 10e9/L    Absolute Monocytes 0.5 0.0 - 1.3 10e9/L    Absolute Eosinophils 0.1 0.0 - 0.7 10e9/L    Absolute Basophils 0.0 0.0 - 0.2 10e9/L    Abs Immature Granulocytes 0.0 0 - 0.4 10e9/L    Absolute Nucleated RBC 0.0    UA reflex to Microscopic and Culture   Result Value Ref Range    Color Urine Light Yellow     Appearance Urine Clear     Glucose Urine Negative NEG^Negative mg/dL    Bilirubin Urine Negative NEG^Negative    Ketones Urine Negative NEG^Negative mg/dL    Specific Gravity Urine 1.017 1.003 - 1.035    Blood Urine Negative NEG^Negative    pH Urine 5.5 4.7 - 8.0 pH    Protein Albumin Urine Negative NEG^Negative mg/dL    Urobilinogen mg/dL Normal 0.0 - 2.0 mg/dL    Nitrite Urine Negative NEG^Negative    Leukocyte Esterase Urine Negative NEG^Negative    Source Midstream Urine    Erythrocyte sedimentation rate auto   Result Value Ref Range    Sed Rate 16 0 - 20 mm/h   Basic metabolic panel   Result Value Ref Range    Sodium 140 133 - 144 mmol/L    Potassium 3.9 3.4 - 5.3 mmol/L    Chloride 107 94 - 109 mmol/L    Carbon Dioxide 28 20 - 32 mmol/L    Anion Gap 5 3 - 14 mmol/L    Glucose 115 (H) 70 - 99 mg/dL    Urea Nitrogen 18 7 - 30 mg/dL    Creatinine 0.96 0.66 - 1.25 mg/dL    GFR Estimate 77 >60 mL/min/[1.73_m2]    GFR Estimate If Black 90 >60 mL/min/[1.73_m2]    Calcium 9.5 8.5 - 10.1 mg/dL         Recent Labs   Lab Test 01/27/19  1234 09/24/17  0622  06/02/17   HGB 14.0  12.5*   < >  --     185   < >  --    INR  --   --   --  0.9    140   < >  --    POTASSIUM 4.0 4.1   < > 4.3   CR 1.02 0.94   < > 1.14    < > = values in this interval not displayed.   EKG sinus no acute ST-T wave change    IMPRESSION:   Reason for surgery/procedure: Left total hip  Diagnosis/reason for consult: Cardiopulmonary clearance    The proposed surgical procedure is considered INTERMEDIATE risk.    REVISED CARDIAC RISK INDEX  The patient has the following serious cardiovascular risks for perioperative complications such as (MI, PE, VFib and 3  AV Block):  No serious cardiac risks  INTERPRETATION: 0 risks: Class I (very low risk - 0.4% complication rate)    The patient has the following additional risks for perioperative complications:  No identified additional risks        RECOMMENDATIONS:             APPROVAL GIVEN to proceed with proposed procedure, without further diagnostic evaluation.  Patient can do 4 METS without difficulty.  He is on aspirin we will hold it for a week prior to the procedure.  I told him to be n.p.o. after midnight before the procedure.       Signed Electronically by: WASHINGTON Dey MD    Copy of this evaluation report is provided to requesting physician.    [facility] Preop Guidelines    Revised Cardiac Risk Index

## 2019-04-11 ENCOUNTER — OFFICE VISIT (OUTPATIENT)
Dept: FAMILY MEDICINE | Facility: OTHER | Age: 74
End: 2019-04-11
Attending: FAMILY MEDICINE
Payer: COMMERCIAL

## 2019-04-11 VITALS
BODY MASS INDEX: 32.07 KG/M2 | WEIGHT: 224 LBS | OXYGEN SATURATION: 94 % | HEIGHT: 70 IN | HEART RATE: 68 BPM | TEMPERATURE: 96.9 F | SYSTOLIC BLOOD PRESSURE: 136 MMHG | DIASTOLIC BLOOD PRESSURE: 72 MMHG

## 2019-04-11 DIAGNOSIS — Z01.818 PREOP GENERAL PHYSICAL EXAM: Primary | ICD-10-CM

## 2019-04-11 LAB
ALBUMIN UR-MCNC: NEGATIVE MG/DL
ANION GAP SERPL CALCULATED.3IONS-SCNC: 5 MMOL/L (ref 3–14)
APPEARANCE UR: CLEAR
BASOPHILS # BLD AUTO: 0 10E9/L (ref 0–0.2)
BASOPHILS NFR BLD AUTO: 0.3 %
BILIRUB UR QL STRIP: NEGATIVE
BUN SERPL-MCNC: 18 MG/DL (ref 7–30)
CALCIUM SERPL-MCNC: 9.5 MG/DL (ref 8.5–10.1)
CHLORIDE SERPL-SCNC: 107 MMOL/L (ref 94–109)
CO2 SERPL-SCNC: 28 MMOL/L (ref 20–32)
COLOR UR AUTO: NORMAL
CREAT SERPL-MCNC: 0.96 MG/DL (ref 0.66–1.25)
DIFFERENTIAL METHOD BLD: NORMAL
EOSINOPHIL # BLD AUTO: 0.1 10E9/L (ref 0–0.7)
EOSINOPHIL NFR BLD AUTO: 1.9 %
ERYTHROCYTE [DISTWIDTH] IN BLOOD BY AUTOMATED COUNT: 14.1 % (ref 10–15)
ERYTHROCYTE [SEDIMENTATION RATE] IN BLOOD BY WESTERGREN METHOD: 16 MM/H (ref 0–20)
GFR SERPL CREATININE-BSD FRML MDRD: 77 ML/MIN/{1.73_M2}
GLUCOSE SERPL-MCNC: 115 MG/DL (ref 70–99)
GLUCOSE UR STRIP-MCNC: NEGATIVE MG/DL
HCT VFR BLD AUTO: 40.6 % (ref 40–53)
HGB BLD-MCNC: 14.2 G/DL (ref 13.3–17.7)
HGB UR QL STRIP: NEGATIVE
IMM GRANULOCYTES # BLD: 0 10E9/L (ref 0–0.4)
IMM GRANULOCYTES NFR BLD: 0.2 %
KETONES UR STRIP-MCNC: NEGATIVE MG/DL
LEUKOCYTE ESTERASE UR QL STRIP: NEGATIVE
LYMPHOCYTES # BLD AUTO: 1.1 10E9/L (ref 0.8–5.3)
LYMPHOCYTES NFR BLD AUTO: 17.3 %
MCH RBC QN AUTO: 30.3 PG (ref 26.5–33)
MCHC RBC AUTO-ENTMCNC: 35 G/DL (ref 31.5–36.5)
MCV RBC AUTO: 87 FL (ref 78–100)
MONOCYTES # BLD AUTO: 0.5 10E9/L (ref 0–1.3)
MONOCYTES NFR BLD AUTO: 7.9 %
NEUTROPHILS # BLD AUTO: 4.6 10E9/L (ref 1.6–8.3)
NEUTROPHILS NFR BLD AUTO: 72.4 %
NITRATE UR QL: NEGATIVE
NRBC # BLD AUTO: 0 10*3/UL
NRBC BLD AUTO-RTO: 0 /100
PH UR STRIP: 5.5 PH (ref 4.7–8)
PLATELET # BLD AUTO: 217 10E9/L (ref 150–450)
POTASSIUM SERPL-SCNC: 3.9 MMOL/L (ref 3.4–5.3)
RBC # BLD AUTO: 4.68 10E12/L (ref 4.4–5.9)
SODIUM SERPL-SCNC: 140 MMOL/L (ref 133–144)
SOURCE: NORMAL
SP GR UR STRIP: 1.02 (ref 1–1.03)
UROBILINOGEN UR STRIP-MCNC: NORMAL MG/DL (ref 0–2)
WBC # BLD AUTO: 6.3 10E9/L (ref 4–11)

## 2019-04-11 PROCEDURE — G0463 HOSPITAL OUTPT CLINIC VISIT: HCPCS

## 2019-04-11 PROCEDURE — 85025 COMPLETE CBC W/AUTO DIFF WBC: CPT | Mod: ZL | Performed by: FAMILY MEDICINE

## 2019-04-11 PROCEDURE — 93010 ELECTROCARDIOGRAM REPORT: CPT | Performed by: INTERNAL MEDICINE

## 2019-04-11 PROCEDURE — 99214 OFFICE O/P EST MOD 30 MIN: CPT | Performed by: FAMILY MEDICINE

## 2019-04-11 PROCEDURE — 80048 BASIC METABOLIC PNL TOTAL CA: CPT | Mod: ZL | Performed by: FAMILY MEDICINE

## 2019-04-11 PROCEDURE — 93005 ELECTROCARDIOGRAM TRACING: CPT

## 2019-04-11 PROCEDURE — 81003 URINALYSIS AUTO W/O SCOPE: CPT | Mod: ZL | Performed by: FAMILY MEDICINE

## 2019-04-11 PROCEDURE — 85652 RBC SED RATE AUTOMATED: CPT | Mod: ZL | Performed by: FAMILY MEDICINE

## 2019-04-11 PROCEDURE — G0463 HOSPITAL OUTPT CLINIC VISIT: HCPCS | Mod: 25

## 2019-04-11 PROCEDURE — 36415 COLL VENOUS BLD VENIPUNCTURE: CPT | Mod: ZL | Performed by: FAMILY MEDICINE

## 2019-04-11 ASSESSMENT — MIFFLIN-ST. JEOR: SCORE: 1767.31

## 2019-04-11 ASSESSMENT — PAIN SCALES - GENERAL: PAINLEVEL: NO PAIN (0)

## 2019-04-11 NOTE — NURSING NOTE
"Chief Complaint   Patient presents with     Pre-Op Exam       Initial /72 (BP Location: Left arm, Patient Position: Chair, Cuff Size: Adult Large)   Pulse 68   Temp 96.9  F (36.1  C) (Tympanic)   Ht 1.778 m (5' 10\")   Wt 101.6 kg (224 lb)   SpO2 94%   BMI 32.14 kg/m   Estimated body mass index is 32.14 kg/m  as calculated from the following:    Height as of this encounter: 1.778 m (5' 10\").    Weight as of this encounter: 101.6 kg (224 lb).  Medication Reconciliation: complete    ROSA HAYWARD LPN  "

## 2019-04-12 ENCOUNTER — TELEPHONE (OUTPATIENT)
Dept: FAMILY MEDICINE | Facility: OTHER | Age: 74
End: 2019-04-12

## 2019-04-15 ENCOUNTER — TRANSFERRED RECORDS (OUTPATIENT)
Dept: HEALTH INFORMATION MANAGEMENT | Facility: CLINIC | Age: 74
End: 2019-04-15

## 2019-08-01 ENCOUNTER — TELEPHONE (OUTPATIENT)
Dept: FAMILY MEDICINE | Facility: OTHER | Age: 74
End: 2019-08-01

## 2019-08-01 NOTE — TELEPHONE ENCOUNTER
8:50 AM    Reason for Call: OVERBOOK    Patient is having the following symptoms: back pain/ wants some xrays  for 2 weeks.    The patient is requesting an appointment for 08/01/19 with anyone.    Was an appointment offered for this call? No  If yes : Appointment type              Date    Preferred method for responding to this message: Telephone Call  What is your phone number ?[phone]    If we cannot reach you directly, may we leave a detailed response at the number you provided? Yes    Can this message wait until your PCP/provider returns, if unavailable today? No, pcp is out     Valarie Ramon

## 2019-08-02 ENCOUNTER — OFFICE VISIT (OUTPATIENT)
Dept: FAMILY MEDICINE | Facility: OTHER | Age: 74
End: 2019-08-02
Attending: FAMILY MEDICINE
Payer: COMMERCIAL

## 2019-08-02 ENCOUNTER — ANCILLARY PROCEDURE (OUTPATIENT)
Dept: GENERAL RADIOLOGY | Facility: OTHER | Age: 74
End: 2019-08-02
Attending: FAMILY MEDICINE
Payer: COMMERCIAL

## 2019-08-02 VITALS
WEIGHT: 224 LBS | SYSTOLIC BLOOD PRESSURE: 138 MMHG | BODY MASS INDEX: 32.07 KG/M2 | OXYGEN SATURATION: 95 % | HEART RATE: 85 BPM | HEIGHT: 70 IN | DIASTOLIC BLOOD PRESSURE: 76 MMHG

## 2019-08-02 DIAGNOSIS — M54.50 ACUTE RIGHT-SIDED LOW BACK PAIN WITHOUT SCIATICA: ICD-10-CM

## 2019-08-02 DIAGNOSIS — M51.369 DDD (DEGENERATIVE DISC DISEASE), LUMBAR: ICD-10-CM

## 2019-08-02 DIAGNOSIS — M51.369 DDD (DEGENERATIVE DISC DISEASE), LUMBAR: Primary | ICD-10-CM

## 2019-08-02 PROCEDURE — G0463 HOSPITAL OUTPT CLINIC VISIT: HCPCS | Mod: 25 | Performed by: COUNSELOR

## 2019-08-02 PROCEDURE — 99214 OFFICE O/P EST MOD 30 MIN: CPT | Performed by: FAMILY MEDICINE

## 2019-08-02 PROCEDURE — 72100 X-RAY EXAM L-S SPINE 2/3 VWS: CPT | Mod: TC

## 2019-08-02 PROCEDURE — G0463 HOSPITAL OUTPT CLINIC VISIT: HCPCS | Performed by: COUNSELOR

## 2019-08-02 RX ORDER — HYDROCODONE BITARTRATE AND ACETAMINOPHEN 5; 325 MG/1; MG/1
1-2 TABLET ORAL EVERY 6 HOURS PRN
Qty: 18 TABLET | Refills: 0 | Status: SHIPPED | OUTPATIENT
Start: 2019-08-02 | End: 2020-01-27

## 2019-08-02 RX ORDER — METHYLPREDNISOLONE 4 MG
TABLET, DOSE PACK ORAL
Qty: 21 TABLET | Refills: 0 | Status: SHIPPED | OUTPATIENT
Start: 2019-08-02 | End: 2022-10-24

## 2019-08-02 ASSESSMENT — PAIN SCALES - GENERAL: PAINLEVEL: WORST PAIN (10)

## 2019-08-02 ASSESSMENT — MIFFLIN-ST. JEOR: SCORE: 1767.31

## 2019-08-02 NOTE — PATIENT INSTRUCTIONS
Xray:  Findings: Bones are mildly osteopenic. No evidence of acute or  subacute fracture. No evidence of acute subluxation. Severe lower  lumbar facet joint degenerative change. Prominent bridging osteophytes  at the L1-2 and L3-4 levels. SI joints are congruent. No evidence of  insufficiency fracture.                                                                 Impression:  1.  No distinct evidence of acute or subacute bony abnormality.   2.  Generalized osteopenia and moderate multilevel degenerative  change, most evident within the lower lumbar facet joints.     MRI ordered.  Complete steroid taper.  Norco for pain control - limiting use.  ER if acutely worsening.

## 2019-08-02 NOTE — PROGRESS NOTES
Subjective     Dann Espinoza is a 73 year old male who presents to clinic today for the following health issues:    HPI   Joint Pain    Onset: Couple weeks, this morning worse; difficulty doing daily tasks, toileting, etc due to pain; now having to use cane    Description:   Location: Right low back   Character: Sharp and Stabbing    Intensity: severe    Progression of Symptoms: worse    No recent imaging; did have prior injections at the [facility] [place] 6 years or more ago    History of left total hip arthroplasty 4/2019    No change in bowel/bladder; no fevers; no abnormal bleeding    Accompanying Signs & Symptoms:  Other symptoms: none; does not radiate; no numbness or tingling or weakness    History:   Previous similar pain: YES      Precipitating factors:   Trauma or overuse: no     Alleviating factors:  Improved by: acetaminophen and Ibuprofen    Therapies Tried and outcome: Not getting much better with the tylenol and ibuprofen.   Most comfortable standing.  Worse is getting up from seated position.    Current Outpatient Medications   Medication     allopurinol (ZYLOPRIM) 300 MG tablet     aspirin (ASPIR-81) 81 MG EC tablet     ATORVASTATIN CALCIUM PO     Cholecalciferol (VITAMIN D3) 2000 UNITS TABS     Docusate Sodium (OCONNOR STOOL SOFTENER PO)     HYDROcodone-acetaminophen (NORCO) 5-325 MG tablet     INDOMETHACIN PO     methylPREDNISolone (MEDROL DOSEPAK) 4 MG tablet therapy pack     tamsulosin (FLOMAX) 0.4 MG capsule     No current facility-administered medications for this visit.        Patient Active Problem List   Diagnosis     ACP (advance care planning)     Lumbago     Asbestosis (H)     Gout     Hyperlipidemia     Decreased hearing     Dizziness     ASNHL (asymmetrical sensorineural hearing loss)     Impacted cerumen     Aortic root dilation (H)     Duane's syndrome of left eye     Constipation     Coxitis     Elevated prostate specific antigen (PSA)     Vitreous floaters     Vitreous  "hemorrhage (H)     Vitreous degeneration     Osteoarthritis of hip     Pneumonia of right lower lobe due to infectious organism (H)     Sepsis, due to unspecified organism (H)     Pneumonia     Past Surgical History:   Procedure Laterality Date     C TOTAL HIP ARTHROPLASTY Left 04/15/2019    [facility]     COLONOSCOPY  07-    Rectal Bleeding > Repeat 3 years (2011)     excision of skin cancer       HEMORRHOIDECTOMY         Social History     Tobacco Use     Smoking status: Former Smoker     Smokeless tobacco: Never Used   Substance Use Topics     Alcohol use: No     Family History   Problem Relation Age of Onset     Crohn's Disease Father      Cancer Father 86        Liver - Cause of Death     Heart Disease Father      Heart Disease Brother      Diabetes Brother 49        Cause of Death     Cerebrovascular Disease Mother         CVA     Other - See Comments Mother         AAA     Heart Disease Mother      Heart Disease Sister            Reviewed and updated as needed this visit by Provider  Tobacco  Allergies  Meds  Med Hx  Surg Hx  Fam Hx  Soc Hx        Review of Systems   ROS COMP: Constitutional, HEENT, cardiovascular, pulmonary, gi and gu systems are negative, except as otherwise noted.      Objective    /76 (BP Location: Right arm, Patient Position: Standing, Cuff Size: Adult Large)   Pulse 85   Ht 1.778 m (5' 10\")   Wt 101.6 kg (224 lb)   SpO2 95%   BMI 32.14 kg/m    Body mass index is 32.14 kg/m .  Physical Exam   GENERAL: alert, no distress, over weight and standing uncomfortably, with aid of cane  RESP: lungs clear to auscultation - no rales, rhonchi or wheezes  CV: regular rate and rhythm, normal S1 S2, no S3 or S4, no murmur, click or rub, no peripheral edema and peripheral pulses strong  MS: normal muscle tone and no edema; very tender to palpation right paralumbar spine - focal - 2-3 levels; no midline bone tenderness; SLR right negative; SLR left mildly positive in back; reflexes " symmetric; strength symmetric; no foot drop;sensation intact  SKIN: no suspicious lesions or rashes  NEURO: Normal strength and tone, sensory exam grossly normal and mentation intact  PSYCH: mentation appears normal, affect normal/bright    Diagnostic Test Results:  Results for orders placed or performed in visit on 08/02/19 (from the past 24 hour(s))   XR Lumbar Spine 2/3 Views    Narrative    Exam: XR LUMBAR SPINE 2-3 VIEWS     History:Male, age 73 years, DDD (degenerative disc disease), lumbar    Comparison:  Abdominal x-ray 1/17/2017    Technique: Three views are submitted.    Findings: Bones are mildly osteopenic. No evidence of acute or  subacute fracture. No evidence of acute subluxation. Severe lower  lumbar facet joint degenerative change. Prominent bridging osteophytes  at the L1-2 and L3-4 levels. SI joints are congruent. No evidence of  insufficiency fracture.           Impression    Impression:  1.  No distinct evidence of acute or subacute bony abnormality.     2.  Generalized osteopenia and moderate multilevel degenerative  change, most evident within the lower lumbar facet joints.    FAROOQ COOPER MD       Assessment & Plan       ICD-10-CM    1. DDD (degenerative disc disease), lumbar M51.36 XR Lumbar Spine 2/3 Views     methylPREDNISolone (MEDROL DOSEPAK) 4 MG tablet therapy pack     HYDROcodone-acetaminophen (NORCO) 5-325 MG tablet     MR Lumbar Spine w/o Contrast   2. Acute right-sided low back pain without sciatica M54.5 methylPREDNISolone (MEDROL DOSEPAK) 4 MG tablet therapy pack     HYDROcodone-acetaminophen (NORCO) 5-325 MG tablet     MR Lumbar Spine w/o Contrast        Xray obtained today.    Chronic pain, progressive over weeks, but acutely worse past 24 hours.  Patient barely able to do ADLs.  Steroid taper.  Limited Norco use.  Known DDD, DJD. Proceed with MRI.    BMI:   Estimated body mass index is 32.14 kg/m  as calculated from the following:    Height as of this encounter: 1.778 m  "(5' 10\").    Weight as of this encounter: 101.6 kg (224 lb).   Weight management plan: Discussed healthy diet and exercise guidelines      MRI ordered.  Complete steroid taper.  Norco for pain control - limiting use.  ER if acutely worsening.    No follow-ups on file.    Katty Cook MD  [facility] [place]          "

## 2019-08-02 NOTE — NURSING NOTE
"Chief Complaint   Patient presents with     Back Pain       Initial /76 (BP Location: Right arm, Patient Position: Standing, Cuff Size: Adult Large)   Pulse 85   Ht 1.778 m (5' 10\")   Wt 101.6 kg (224 lb)   SpO2 95%   BMI 32.14 kg/m   Estimated body mass index is 32.14 kg/m  as calculated from the following:    Height as of this encounter: 1.778 m (5' 10\").    Weight as of this encounter: 101.6 kg (224 lb).  Medication Reconciliation: complete     Jaz Christina      "

## 2019-08-09 ENCOUNTER — HOSPITAL ENCOUNTER (OUTPATIENT)
Dept: MRI IMAGING | Facility: HOSPITAL | Age: 74
Discharge: HOME | End: 2019-08-09
Attending: FAMILY MEDICINE | Admitting: FAMILY MEDICINE
Payer: COMMERCIAL

## 2019-08-09 DIAGNOSIS — M51.369 DDD (DEGENERATIVE DISC DISEASE), LUMBAR: ICD-10-CM

## 2019-08-09 DIAGNOSIS — M54.50 ACUTE RIGHT-SIDED LOW BACK PAIN WITHOUT SCIATICA: ICD-10-CM

## 2019-08-09 PROCEDURE — 72148 MRI LUMBAR SPINE W/O DYE: CPT | Mod: TC

## 2019-08-12 NOTE — RESULT ENCOUNTER NOTE
Patient notified of their results.  Would like to wait for injections as back has been at a 0/10 for past week.   Advised to call back if pain returns.   Xiomy Singh LPN

## 2019-08-26 DIAGNOSIS — M10.00 IDIOPATHIC GOUT, UNSPECIFIED CHRONICITY, UNSPECIFIED SITE: ICD-10-CM

## 2019-08-28 RX ORDER — ALLOPURINOL 300 MG/1
TABLET ORAL
Qty: 90 TABLET | Refills: 1 | Status: SHIPPED | OUTPATIENT
Start: 2019-08-28 | End: 2020-01-20

## 2020-01-08 NOTE — MR AVS SNAPSHOT
After Visit Summary   5/31/2018    Dann Espinoza    MRN: [id number]           Patient Information     Date Of Birth          1945        Visit Information        Provider Department      5/31/2018 2:45 PM WASHINGTON Dey MD [facility]        Today's Diagnoses     Need for hepatitis C screening test    -  1    Special screening for malignant neoplasms, colon           Follow-ups after your visit        Your next 10 appointments already scheduled     May 31, 2018  2:45 PM CDT   (Arrive by 2:30 PM)   Office Visit with WASHINGTON Dey MD   [facility] ([facility] )    [address] [facility] [address]   [phone]           Bring a current list of meds and any records pertaining to this visit.  For Physicals, please bring immunization records and any forms needing to be filled out.  Please arrive 15 minutes early to complete paperwork and register.              Future tests that were ordered for you today     Open Future Orders        Priority Expected Expires Ordered    Immunos occult blood Routine  5/31/2019 5/31/2018            Who to contact     If you have questions or need follow up information about today's clinic visit or your schedule please contact [facility] directly at [phone].  Normal or non-critical lab and imaging results will be communicated to you by MyChart, letter or phone within 4 business days after the clinic has received the results. If you do not hear from us within 7 days, please contact the clinic through MyChart or phone. If you have a critical or abnormal lab result, we will notify you by phone as soon as possible.  Submit refill requests through [employer] or call your pharmacy and they will forward the refill request to us. Please allow 3 business days for your refill to be completed.          Additional Information About Your Visit        Care EveryWhere ID     This is your Care EveryWhere ID. This  could be used by other organizations to access your [facility] medical records  [id number]        Your Vitals Were     Pulse Temperature Pulse Oximetry BMI (Body Mass Index)          72 97.1  F (36.2  C) (Tympanic) 95% 32.43 kg/m2         Blood Pressure from Last 3 Encounters:   05/31/18 130/64   09/29/17 106/62   09/24/17 127/63    Weight from Last 3 Encounters:   05/31/18 226 lb (102.5 kg)   09/29/17 216 lb (98 kg)   09/24/17 232 lb 12.9 oz (105.6 kg)              We Performed the Following     Hepatitis C antibody        Primary Care Provider Office Phone # Fax #    R Navneet Dey [phone] [address] [id number]        Equal Access to Services     SAURAV BARRIOS : Link avilez Sodorie, waaxda luqadaha, qaybta kaalmada reinaldo, fallon sánchez . So [facility] [phone].    ATENCIÓN: Si habla español, tiene a elder disposición servicios gratuitos de asistencia lingüística. [facility] [phone].    We comply with applicable federal civil rights laws and [place] laws. We do not discriminate on the basis of race, color, national origin, age, disability, sex, sexual orientation, or gender identity.            Thank you!     Thank you for choosing [facility]  for your care. Our goal is always to provide you with excellent care. Hearing back from our patients is one way we can continue to improve our services. Please take a few minutes to complete the written survey that you may receive in the mail after your visit with us. Thank you!             Your Updated Medication List - Protect others around you: Learn how to safely use, store and throw away your medicines at www.disposemymeds.org.          This list is accurate as of 5/31/18  2:33 PM.  Always use your most recent med list.                   Brand Name Dispense Instructions for use Diagnosis    albuterol 108 (90 Base) MCG/ACT Inhaler    PROAIR HFA/PROVENTIL HFA/VENTOLIN HFA     1 Inhaler    Inhale 2 puffs into the lungs every 6 hours as needed for shortness of breath / dyspnea or wheezing        allopurinol 300 MG tablet    ZYLOPRIM    90 tablet    Take 1 tablet (300 mg) by mouth daily    Idiopathic gout, unspecified chronicity, unspecified site       aspirin 81 MG EC tablet   Generic drug:  aspirin      Take 1 tablet by mouth daily.        ATORVASTATIN CALCIUM PO      Take 40 mg by mouth daily        HYDROcodone-acetaminophen 5-325 MG per tablet    NORCO    60 tablet    TAKE ONE TABLET BY MOUTH EVRY 6 HOURS AS NEEDED FOR MODERATE TO SEVERE PAIN    Hip pain, left       INDOMETHACIN PO           multivitamin, therapeutic with minerals Tabs tablet      Take 1 tablet by mouth daily        OCONNOR STOOL SOFTENER PO           UNABLE TO FIND      MEDICATION NAME: medication for prostate. Unknown name        Vitamin D3 2000 units Tabs      Take 1 tablet by mouth daily.           normal...

## 2020-01-20 DIAGNOSIS — M10.00 IDIOPATHIC GOUT, UNSPECIFIED CHRONICITY, UNSPECIFIED SITE: ICD-10-CM

## 2020-01-20 NOTE — TELEPHONE ENCOUNTER
Allopurinol       Last Written Prescription Date:  8/28/19  Last Fill Quantity: 90,   # refills: 0  Last Office Visit: 8/2/19  Future Office visit:       Routing refill request to provider for review/approval because:  Drug not on the G, P or [facility] refill protocol or controlled substance

## 2020-01-22 RX ORDER — ALLOPURINOL 300 MG/1
1 TABLET ORAL DAILY
Qty: 90 TABLET | Refills: 1 | Status: SHIPPED | OUTPATIENT
Start: 2020-01-22 | End: 2020-09-02

## 2020-01-27 DIAGNOSIS — M51.369 DDD (DEGENERATIVE DISC DISEASE), LUMBAR: ICD-10-CM

## 2020-01-27 DIAGNOSIS — M54.50 ACUTE RIGHT-SIDED LOW BACK PAIN WITHOUT SCIATICA: ICD-10-CM

## 2020-01-27 RX ORDER — HYDROCODONE BITARTRATE AND ACETAMINOPHEN 5; 325 MG/1; MG/1
1-2 TABLET ORAL EVERY 6 HOURS PRN
Qty: 18 TABLET | Refills: 0 | Status: SHIPPED | OUTPATIENT
Start: 2020-01-27 | End: 2022-10-24

## 2020-01-27 NOTE — TELEPHONE ENCOUNTER
Norco  Last Written Prescription Date: 8/2/19  Last Fill Quantity: 18 # of Refills: 0  Last Office Visit: 8/2/19

## 2020-04-01 NOTE — TELEPHONE ENCOUNTER
Proair inhaler      Last Written Prescription Date:  6/7/2017  Last Fill Quantity: 9,   # refills: 0  Last Office Visit: 8/2/2019  Future Office visit:       Routing refill request to provider for review/approval because:

## 2020-04-02 RX ORDER — ALBUTEROL SULFATE 90 UG/1
2 AEROSOL, METERED RESPIRATORY (INHALATION) EVERY 6 HOURS
Qty: 1 INHALER | Refills: 0 | OUTPATIENT
Start: 2020-04-02

## 2020-04-15 RX ORDER — ALBUTEROL SULFATE 90 UG/1
2 AEROSOL, METERED RESPIRATORY (INHALATION) EVERY 6 HOURS
Qty: 18 G | Refills: 1 | OUTPATIENT
Start: 2020-04-15

## 2020-05-15 RX ORDER — ALBUTEROL SULFATE 90 UG/1
2 AEROSOL, METERED RESPIRATORY (INHALATION) EVERY 6 HOURS
Qty: 18 G | Refills: 1 | OUTPATIENT
Start: 2020-05-15

## 2020-06-02 RX ORDER — ALBUTEROL SULFATE 90 UG/1
2 AEROSOL, METERED RESPIRATORY (INHALATION) EVERY 6 HOURS
Qty: 18 G | Refills: 0 | OUTPATIENT
Start: 2020-06-02

## 2020-09-02 DIAGNOSIS — M10.00 IDIOPATHIC GOUT, UNSPECIFIED CHRONICITY, UNSPECIFIED SITE: ICD-10-CM

## 2020-09-02 RX ORDER — ALLOPURINOL 300 MG/1
TABLET ORAL
Qty: 90 TABLET | Refills: 0 | Status: SHIPPED | OUTPATIENT
Start: 2020-09-02 | End: 2020-12-17

## 2020-09-02 NOTE — TELEPHONE ENCOUNTER
Zyloprim       Last Written Prescription Date:  1/22/2020  Last Fill Quantity: 90,   # refills: 1  Last Office Visit: 8/2/2019  Future Office visit:

## 2020-12-17 DIAGNOSIS — M10.00 IDIOPATHIC GOUT, UNSPECIFIED CHRONICITY, UNSPECIFIED SITE: ICD-10-CM

## 2020-12-17 RX ORDER — ALLOPURINOL 300 MG/1
TABLET ORAL
Qty: 90 TABLET | Refills: 0 | Status: SHIPPED | OUTPATIENT
Start: 2020-12-17 | End: 2021-03-23

## 2020-12-17 NOTE — TELEPHONE ENCOUNTER
allopurinol (ZYLOPRIM) 300 MG tablet      Last Written Prescription Date:  9/2/20  Last Fill Quantity: 90,   # refills: 0  Last Office Visit: 4/11/2019  Future Office visit:       Routing refill request to provider for review/approval

## 2021-03-22 DIAGNOSIS — M10.00 IDIOPATHIC GOUT, UNSPECIFIED CHRONICITY, UNSPECIFIED SITE: ICD-10-CM

## 2021-03-23 RX ORDER — ALLOPURINOL 300 MG/1
TABLET ORAL
Qty: 90 TABLET | Refills: 0 | Status: SHIPPED | OUTPATIENT
Start: 2021-03-23 | End: 2021-06-29

## 2021-03-23 NOTE — TELEPHONE ENCOUNTER
Allopurinol 300 mg      Last Written Prescription Date:  12-  Last Fill Quantity: 90,   # refills: 0  Last Office Visit: 8-2-19

## 2021-06-27 DIAGNOSIS — M10.00 IDIOPATHIC GOUT, UNSPECIFIED CHRONICITY, UNSPECIFIED SITE: ICD-10-CM

## 2021-06-29 RX ORDER — ALLOPURINOL 300 MG/1
TABLET ORAL
Qty: 90 TABLET | Refills: 0 | Status: SHIPPED | OUTPATIENT
Start: 2021-06-29 | End: 2021-09-17

## 2021-06-29 NOTE — TELEPHONE ENCOUNTER
Allopurinol 300 MG Oral Tablet    Last Written Prescription Date:  3/23/21  Last Fill Quantity: 90,   # refills: 0  Last Office Visit: 8/2/2019  Future Office visit:       Routing refill request to provider for review/approval because:

## 2021-09-16 DIAGNOSIS — M10.00 IDIOPATHIC GOUT, UNSPECIFIED CHRONICITY, UNSPECIFIED SITE: ICD-10-CM

## 2021-09-17 RX ORDER — ALLOPURINOL 300 MG/1
TABLET ORAL
Qty: 90 TABLET | Refills: 0 | Status: SHIPPED | OUTPATIENT
Start: 2021-09-17 | End: 2021-12-29

## 2021-09-17 NOTE — TELEPHONE ENCOUNTER
allopurinol (ZYLOPRIM) 300 MG tablet      Last Written Prescription Date:  6-29-21  Last Fill Quantity: 90,   # refills: 0  Last Office Visit: 4-11-19  Future Office visit:       Routing refill request to provider for review/approval because:   CBC on file in past 12 months Protocol Details    ALT on file in past 12 months     Has Uric Acid on file in past 12 months and value is less than 6     Recent (12 mo) or future (30 days) visit within the authorizing provider's specialty     Normal serum creatinine on file in the past 12 months

## 2022-06-28 DIAGNOSIS — M10.00 IDIOPATHIC GOUT, UNSPECIFIED CHRONICITY, UNSPECIFIED SITE: ICD-10-CM

## 2022-06-28 RX ORDER — ALLOPURINOL 300 MG/1
1 TABLET ORAL DAILY
Qty: 90 TABLET | Refills: 1 | OUTPATIENT
Start: 2022-06-28

## 2022-07-06 NOTE — TELEPHONE ENCOUNTER
Attempt # 1  Outcome: Left Message   Comment: lvm for pt to call and schedule an est care with a new pcp

## 2022-07-13 DIAGNOSIS — M10.00 IDIOPATHIC GOUT, UNSPECIFIED CHRONICITY, UNSPECIFIED SITE: ICD-10-CM

## 2022-07-13 RX ORDER — ALLOPURINOL 300 MG/1
1 TABLET ORAL DAILY
Qty: 90 TABLET | Refills: 0 | Status: SHIPPED | OUTPATIENT
Start: 2022-07-13 | End: 2022-10-18

## 2022-07-13 NOTE — TELEPHONE ENCOUNTER
Allopurinol 300mg    Last Written Prescription Date:  12/29/21  Last Fill Quantity: 90,   # refills: 1  Last Office Visit: 8/2/2019  Future Office visit:   [facility] care 10/24/22 with Dr. Mirza    Routing refill request to provider for review/approval because:  PCP is no longer active

## 2022-10-21 NOTE — PROGRESS NOTES
Assessment & Plan     Encounter to establish care  followed with Dr. Navneet Dey with last visit 4/11/2019    Pure hypercholesterolemia  - c/w atorvastatin 40mg, ASA 81mg   - [facility]    Aortic root dilation (H)  Due for updated echo   - Echocardiogram Complete; Future    Midline low back pain with bilateral sciatica, unspecified chronicity  stable    Gout, unspecified cause, unspecified chronicity, unspecified site  Last flare a couple of months ago. Gout flares resolve with (1) pill of indomethacin  - c/w allopurinol 300mg      Elevated prostate specific antigen (PSA)  [facility]bt[facility] records    Bilateral change in hearing  - Adult Audiology  Referral; Future  - Adult ENT  Referral; Future       See Patient Instructions    Return in about 6 months (around 4/24/2023) for Physical Exam.    Nelly Mirza MD  [facility] - ANGELICA Quigley is a 77 year old, presenting for the following health issues:  Establish Care, Pain, and Lipids      HPI     Establish care: followed with Dr. Navneet Dey with last visit 4/11/2019    Chronic/Recurring Back Pain Follow Up      Where is your back pain located? (Select all that apply) low back bilateral    How would you describe your back pain?  sharp, shooting and stabbing    Where does your back pain spread? Both legs    Since your last clinic visit for back pain, how has your pain changed? always present, but gets better and worse    Does your back pain interfere with your job? Not applicable    Since your last visit, have you tried any new treatment? No    - h/o injections with [place] [facility]  - h/o left NERY    - working on house / garage with worsening pain  - h/o injections helping    Hyperlipidemia Follow-Up      Are you regularly taking any medication or supplement to lower your cholesterol?   Yes- Atorvastatin 40 mg    Are you having muscle aches or other side effects that you think could be caused by your  cholesterol lowering medication?  No    - atorvastatin 40mg, many years. [facility]    Recent Labs   Lab Test 10/06/16  1327   CHOL 164   HDL 49   LDL 86   TRIG 143       # Gout: last flare of gout was a couple of months ago. Uses indomethacin (1) pill w/ resolution of symptoms   - allopurinol 300mg     # Aortic root dilation   - last echo (4/20/2016)    # Asbestosis:  [place]  - 25 % lung function   - no pulmonary issues   - no inhalers     # Elevated psa  - last PSA (10/6/2016) 2.70    Labs: CMP, CBC / diff, lipids [facility]  Imaging: echo    # Wellness:  - update smoking history ** HM - quit 50 years ago  - [facility] cancer [facility]   - immunizations: COVID booster (declines), influenza ([facility])        Review of Systems   Constitutional: Negative for chills and fever.   HENT: Negative for congestion.    Respiratory: Negative for shortness of breath and wheezing.    Cardiovascular: Positive for palpitations (w/ anxiety). Negative for chest pain and peripheral edema.   Gastrointestinal: Positive for heartburn (2 days, resolved). Negative for abdominal pain.   Musculoskeletal: Positive for back pain.   Psychiatric/Behavioral: Positive for dysphoric mood and sleep disturbance (d/t wife's health). The patient is nervous/anxious.           Objective    /68   Pulse 83   Temp 98  F (36.7  C) (Tympanic)   Wt 100 kg (220 lb 8 oz)   SpO2 95%   BMI 31.64 kg/m    Body mass index is 31.64 kg/m .  Physical Exam  Constitutional:       General: He is not in acute distress.     Appearance: He is not ill-appearing.   Cardiovascular:      Rate and Rhythm: Normal rate and regular rhythm.      Pulses: Normal pulses.      Heart sounds: No murmur heard.  Pulmonary:      Effort: Pulmonary effort is normal. No respiratory distress.      Breath sounds: No wheezing or rales.   Musculoskeletal:      Right lower leg: No edema.      Left lower leg: No edema.   Neurological:       Mental Status: He is alert.   Psychiatric:         Mood and Affect: Mood normal.          Will obtain [facility] labs    MRI Lumbar (8/9/2019):  IMPRESSION:   1. Mild multilevel degenerative disc disease with flattening of the ventral thecal sac.  2. Mass effect on both L5 nerve roots in the thecal sac at the L4-5 level due to bulging of disc and facet changes.  3. Bilateral L4 nerve root ganglion compression, right greater than left due to disc and spur.       Echo (4/20/2016)  ASSESSMENT:  Echocardiographic study revealing  1.  Left ventricular enlargement. Normal systolic function. Ejection fraction at 55%.  2.  Normal atrium.  3.  Normal right ventricle size and function.  4.  Trace mitral regurgitation.  5.  Aortic valve with some sclerosis.  There is trace aortic insufficiency.  6.  Slight enlargement of the aortic root.  7.  Trace tricuspid regurgitation and trace pulmonic valve insufficiency.        US carotid (12/21/2018): No hemodynamically significant stenosis.

## 2022-10-24 ENCOUNTER — OFFICE VISIT (OUTPATIENT)
Dept: FAMILY MEDICINE | Facility: OTHER | Age: 77
End: 2022-10-24
Attending: FAMILY MEDICINE
Payer: COMMERCIAL

## 2022-10-24 VITALS
BODY MASS INDEX: 31.64 KG/M2 | SYSTOLIC BLOOD PRESSURE: 116 MMHG | HEART RATE: 83 BPM | OXYGEN SATURATION: 95 % | DIASTOLIC BLOOD PRESSURE: 68 MMHG | TEMPERATURE: 98 F | WEIGHT: 220.5 LBS

## 2022-10-24 DIAGNOSIS — M54.41 MIDLINE LOW BACK PAIN WITH BILATERAL SCIATICA, UNSPECIFIED CHRONICITY: ICD-10-CM

## 2022-10-24 DIAGNOSIS — R97.20 ELEVATED PROSTATE SPECIFIC ANTIGEN (PSA): ICD-10-CM

## 2022-10-24 DIAGNOSIS — I77.810 AORTIC ROOT DILATION (H): ICD-10-CM

## 2022-10-24 DIAGNOSIS — M54.42 MIDLINE LOW BACK PAIN WITH BILATERAL SCIATICA, UNSPECIFIED CHRONICITY: ICD-10-CM

## 2022-10-24 DIAGNOSIS — E78.00 PURE HYPERCHOLESTEROLEMIA: ICD-10-CM

## 2022-10-24 DIAGNOSIS — M10.9 GOUT, UNSPECIFIED CAUSE, UNSPECIFIED CHRONICITY, UNSPECIFIED SITE: ICD-10-CM

## 2022-10-24 DIAGNOSIS — H91.93 BILATERAL CHANGE IN HEARING: ICD-10-CM

## 2022-10-24 DIAGNOSIS — Z76.89 ENCOUNTER TO ESTABLISH CARE: Primary | ICD-10-CM

## 2022-10-24 PROBLEM — G45.0 VERTEBROBASILAR CIRCULATION TRANSIENT ISCHEMIC ATTACK: Status: ACTIVE | Noted: 2018-12-13

## 2022-10-24 PROBLEM — H25.099: Status: ACTIVE | Noted: 2017-10-05

## 2022-10-24 PROCEDURE — 99214 OFFICE O/P EST MOD 30 MIN: CPT | Performed by: FAMILY MEDICINE

## 2022-10-24 PROCEDURE — G0463 HOSPITAL OUTPT CLINIC VISIT: HCPCS

## 2022-10-24 PROCEDURE — G0463 HOSPITAL OUTPT CLINIC VISIT: HCPCS | Mod: 25

## 2022-10-24 ASSESSMENT — ANXIETY QUESTIONNAIRES
GAD7 TOTAL SCORE: 4
3. WORRYING TOO MUCH ABOUT DIFFERENT THINGS: NOT AT ALL
6. BECOMING EASILY ANNOYED OR IRRITABLE: SEVERAL DAYS
GAD7 TOTAL SCORE: 4
1. FEELING NERVOUS, ANXIOUS, OR ON EDGE: SEVERAL DAYS
5. BEING SO RESTLESS THAT IT IS HARD TO SIT STILL: NOT AT ALL
7. FEELING AFRAID AS IF SOMETHING AWFUL MIGHT HAPPEN: NOT AT ALL
2. NOT BEING ABLE TO STOP OR CONTROL WORRYING: SEVERAL DAYS
IF YOU CHECKED OFF ANY PROBLEMS ON THIS QUESTIONNAIRE, HOW DIFFICULT HAVE THESE PROBLEMS MADE IT FOR YOU TO DO YOUR WORK, TAKE CARE OF THINGS AT HOME, OR GET ALONG WITH OTHER PEOPLE: SOMEWHAT DIFFICULT

## 2022-10-24 ASSESSMENT — ENCOUNTER SYMPTOMS
PALPITATIONS: 1
SHORTNESS OF BREATH: 0
DYSPHORIC MOOD: 1
NERVOUS/ANXIOUS: 1
FEVER: 0
CHILLS: 0
BACK PAIN: 1
WHEEZING: 0
HEARTBURN: 1
ABDOMINAL PAIN: 0
SLEEP DISTURBANCE: 1

## 2022-10-24 ASSESSMENT — PATIENT HEALTH QUESTIONNAIRE - PHQ9
SUM OF ALL RESPONSES TO PHQ QUESTIONS 1-9: 2
5. POOR APPETITE OR OVEREATING: SEVERAL DAYS

## 2022-10-24 NOTE — PATIENT INSTRUCTIONS
We placed a referral for hearing test (audiology and ENT)    We put in an order for echocardiogram

## 2022-10-24 NOTE — NURSING NOTE
"Chief Complaint   Patient presents with     Establish Care     Pain     Lipids       Initial /68   Pulse 83   Temp 98  F (36.7  C) (Tympanic)   Wt 100 kg (220 lb 8 oz)   SpO2 95%   BMI 31.64 kg/m   Estimated body mass index is 31.64 kg/m  as calculated from the following:    Height as of 8/2/19: 1.778 m (5' 10\").    Weight as of this encounter: 100 kg (220 lb 8 oz).  Medication Reconciliation: complete  Deanna Feng LPN  "

## 2022-10-25 ENCOUNTER — IMMUNIZATION (OUTPATIENT)
Dept: FAMILY MEDICINE | Facility: OTHER | Age: 77
End: 2022-10-25
Attending: FAMILY MEDICINE
Payer: COMMERCIAL

## 2022-10-25 DIAGNOSIS — Z23 HIGH PRIORITY FOR 2019-NCOV VACCINE: Primary | ICD-10-CM

## 2022-10-25 PROCEDURE — 0124A HC ADMIN COVID VAC PFIZER 12+ BIVAL ADDITIONAL: CPT

## 2022-10-25 PROCEDURE — 91312 COVID-19,PF,PFIZER BOOSTER BIVALENT: CPT

## 2022-10-27 ENCOUNTER — TELEPHONE (OUTPATIENT)
Dept: AUDIOLOGY | Facility: OTHER | Age: 77
End: 2022-10-27

## 2022-10-27 NOTE — TELEPHONE ENCOUNTER
Patient was called 3 times and voicemails left to get appointments from referrals scheduled. 10/27-Letter was sent to patient to call and get this scheduled.    Denita Higgins

## 2022-11-22 ENCOUNTER — HOSPITAL ENCOUNTER (OUTPATIENT)
Dept: CARDIOLOGY | Facility: HOSPITAL | Age: 77
Discharge: HOME | End: 2022-11-22
Attending: FAMILY MEDICINE | Admitting: INTERNAL MEDICINE
Payer: COMMERCIAL

## 2022-11-22 DIAGNOSIS — I77.810 AORTIC ROOT DILATION (H): ICD-10-CM

## 2022-11-22 LAB — LVEF ECHO: NORMAL

## 2022-11-22 PROCEDURE — 93306 TTE W/DOPPLER COMPLETE: CPT | Mod: 26 | Performed by: INTERNAL MEDICINE

## 2022-11-22 PROCEDURE — 93306 TTE W/DOPPLER COMPLETE: CPT

## 2022-12-05 ENCOUNTER — OFFICE VISIT (OUTPATIENT)
Dept: AUDIOLOGY | Facility: OTHER | Age: 77
End: 2022-12-05
Attending: FAMILY MEDICINE
Payer: COMMERCIAL

## 2022-12-05 DIAGNOSIS — H91.93 BILATERAL CHANGE IN HEARING: ICD-10-CM

## 2022-12-05 DIAGNOSIS — H90.3 SENSORINEURAL HEARING LOSS (SNHL) OF BOTH EARS: Primary | ICD-10-CM

## 2022-12-05 DIAGNOSIS — H93.13 TINNITUS, BILATERAL: ICD-10-CM

## 2022-12-05 PROCEDURE — 92557 COMPREHENSIVE HEARING TEST: CPT | Performed by: AUDIOLOGIST

## 2022-12-05 PROCEDURE — 92550 TYMPANOMETRY & REFLEX THRESH: CPT | Performed by: AUDIOLOGIST

## 2022-12-05 NOTE — PROGRESS NOTES
Audiology Evaluation Completed. Please refer SCANNED AUDIOGRAM and/or TYMPANOGRAM for BACKGROUND, RESULTS, RECOMMENDATIONS.      Debi BARRERA, [facility]  Audiologist #[id number]

## 2023-03-15 ENCOUNTER — TRANSFERRED RECORDS (OUTPATIENT)
Dept: HEALTH INFORMATION MANAGEMENT | Facility: CLINIC | Age: 78
End: 2023-03-15

## 2023-03-15 LAB
ALT SERPL-CCNC: 19 U/L (ref 13–61)
AST SERPL-CCNC: 20 U/L (ref 15–37)
CHOLESTEROL (EXTERNAL): 133 MG/DL
CREATININE (EXTERNAL): 1.1 MG/DL (ref 0.7–1.2)
GFR ESTIMATED (EXTERNAL): 69 ML/MIN/1.73M2
GLUCOSE (EXTERNAL): 84 MG/DL (ref 74–106)
HBA1C MFR BLD: 5.2 % (ref 4–6)
HDLC SERPL-MCNC: 39 MG/DL
LDL CHOLESTEROL CALCULATED (EXTERNAL): 76 MG/DL
NON HDL CHOLESTEROL (EXTERNAL): 94 MG/DL
POTASSIUM (EXTERNAL): 4.3 MMOL/L (ref 3.5–5)
TRIGLYCERIDES (EXTERNAL): 92 MG/DL

## 2023-03-21 LAB — HEMOCCULT STL QL IA: NEGATIVE

## 2023-04-25 ENCOUNTER — TELEPHONE (OUTPATIENT)
Dept: FAMILY MEDICINE | Facility: OTHER | Age: 78
End: 2023-04-25

## 2023-04-25 NOTE — PROGRESS NOTES
"SUBJECTIVE:   Dann is a 77 year old who presents for Preventive Visit.       View : No data to display.            Patient has been advised of split billing requirements and indicates understanding: Yes  Are you in the first 12 months of your Medicare coverage?  No    Healthy Habits:     In general, how would you rate your overall health?  Good    Frequency of exercise:  1 day/week    Duration of exercise:  N/A    Do you usually eat at least 4 servings of fruit and vegetables a day, include whole grains    & fiber and avoid regularly eating high fat or \"junk\" foods?  No    Ability to successfully perform activities of daily living:  No assistance needed    Home Safety:  No safety concerns identified    Hearing Impairment:  Difficult to understand a speaker at a public meeting or Sikh service and difficulty understanding soft or whispered speech    In the past 6 months, have you been bothered by leaking of urine?  No    In general, how would you rate your overall mental or emotional health?  Excellent      PHQ-2 Total Score: 0    Additional concerns today:  No    [facility]    Have you ever done Advance Care Planning? (For example, a Health Directive, POLST, or a discussion with a medical provider or your loved ones about your wishes): No, advance care planning information given to patient to review.  Patient declined advance care planning discussion at this time.       Fall risk  Fallen 2 or more times in the past year?: No  Any fall with injury in the past year?: No    Cognitive Screening   1) Repeat 3 items (Leader, Season, Table)    2) Clock draw: NORMAL  3) 3 item recall: Recalls 1 object   Results: NORMAL clock, 1-2 items recalled: COGNITIVE IMPAIRMENT LESS LIKELY    Mini-CogTM Copyright CHIDI Ludwig. Licensed by the author for use in [facility]; reprinted with permission (diallo@.[email or web address]). All rights reserved.      Do you have sleep apnea, excessive snoring or " daytime drowsiness?: no    Reviewed and updated as needed this visit by clinical staff   Tobacco  Allergies  Meds  Problems  Med Hx  Surg Hx  Fam Hx          Reviewed and updated as needed this visit by Provider   Tobacco  Allergies  Meds  Problems  Med Hx  Surg Hx  Fam Hx         Social History     Tobacco Use     Smoking status: Former     Smokeless tobacco: Never     Tobacco comments:     Quit 50 years ago (2022)   Vaping Use     Vaping status: Not on file   Substance Use Topics     Alcohol use: No             4/27/2023    10:14 AM   Alcohol Use   Prescreen: >3 drinks/day or >7 drinks/week? No          View : No data to display.              Do you have a current opioid prescription? No  Do you use any other controlled substances or medications that are not prescribed by a provider? None      Hyperlipidemia Follow-Up      Are you regularly taking any medication or supplement to lower your cholesterol?   Yes- Atorvstatin 40 mg    Are you having muscle aches or other side effects that you think could be caused by your cholesterol lowering medication?  No      # Wellness:  - Immunizations: UTD  - [facility]  - Dexa scan: NA d/t age  - Colon cancer [facility]  - PSA[facility]  - Lung cancer screening: quit smoking over 40 years ago  - AAA screening:     - [facility] 2023  - blood sugar up slightly, otherwise no concerns per Peter    Current providers sharing in care for this patient include:   Patient Care Team:  Nelly Mirza MD as PCP - General (Family Medicine)  Nelly Mirza MD as Assigned PCP    The following health maintenance items are reviewed in Epic and correct as of today:  Health Maintenance   Topic Date Due     DTAP/TDAP/TD IMMUNIZATION (1 - Tdap) Never done     ZOSTER IMMUNIZATION (1 of 2) Never done     COLORECTAL CANCER SCREENING  06/06/2018     Pneumococcal Vaccine: 65+ Years (2 - PCV) 09/24/2018     STOOL GUIAC  06/05/2019     LIPID   10/06/2021     INFLUENZA VACCINE (1) 09/01/2022     DUC ASSESSMENT  10/24/2023     PHQ-9  10/24/2023     MEDICARE ANNUAL WELLNESS VISIT  04/27/2024     FALL RISK ASSESSMENT  04/27/2024     ADVANCE CARE PLANNING  04/27/2028     HEPATITIS C SCREENING  Completed     PHQ-2 (once per calendar year)  Completed     COVID-19 Vaccine  Completed     IPV IMMUNIZATION  Aged Out     MENINGITIS IMMUNIZATION  Aged Out     LUNG CANCER SCREENING  Discontinued     BP Readings from Last 3 Encounters:   04/27/23 138/74   10/24/22 116/68   08/02/19 138/76    Wt Readings from Last 3 Encounters:   04/27/23 103 kg (227 lb)   10/24/22 100 kg (220 lb 8 oz)   08/02/19 101.6 kg (224 lb)                  Patient Active Problem List   Diagnosis     ACP (advance care planning)     Lumbago     Asbestosis (H)     Gout     Hyperlipidemia     Decreased hearing     Dizziness     ASNHL (asymmetrical sensorineural hearing loss)     Impacted cerumen     Aortic root dilation (H) - echo (11/2022) stable     Duane's syndrome of left eye     Constipation     Coxitis     Elevated prostate specific antigen (PSA)     Vitreous floaters     Vitreous hemorrhage (H)     Vitreous degeneration     Osteoarthritis of hip     Pneumonia of right lower lobe due to infectious organism     Sepsis, due to unspecified organism     Pneumonia     Senile incipient cataract, unspecified laterality     Vertebrobasilar circulation transient ischemic attack     Ametropia     Brow ptosis     Combined forms of age-related cataract of both eyes     Dermatochalasis of both upper eyelids     History of migraine     Subjective visual disturbance     Past Surgical History:   Procedure Laterality Date     COLONOSCOPY  07-    Rectal Bleeding > Repeat 3 years (2011)     excision of skin cancer       HEMORRHOIDECTOMY       ZZC TOTAL HIP ARTHROPLASTY [facility]/15/2019    [facility]       Social History     Tobacco Use     Smoking status: Former     Smokeless tobacco: Never     Tobacco  comments:     Quit 50 years ago (2022)   Vaping Use     Vaping status: Not on file   Substance Use Topics     Alcohol use: No     Family History   Problem Relation Age of Onset     Crohn's Disease Father      Cancer Father 86        Liver - Cause of Death     Heart Disease Father      Heart Disease Brother      Diabetes Brother 49        Cause of Death     Cerebrovascular Disease Mother         CVA     Other - See Comments Mother         AAA     Heart Disease Mother      Heart Disease Sister          Current Outpatient Medications   Medication Sig Dispense Refill     allopurinol (ZYLOPRIM) 300 MG tablet Take 1 tablet by mouth once daily 90 tablet 2     aspirin (ASA) 81 MG EC tablet Take 1 tablet by mouth daily.       ATORVASTATIN CALCIUM PO Take 40 mg by mouth daily       INDOMETHACIN PO        tamsulosin (FLOMAX) 0.4 MG capsule Take 0.4 mg by mouth 2 times daily       Allergies   Allergen Reactions     Azithromycin Rash     He developed a rash when on both Azithromycin and Ceftriaxone- unsure which one may have been the culprit       Ceftriaxone Rash     He developed a rash when he was on Ceftriaxone + Azithromycin     Levaquin [Levofloxacin] Rash           Review of Systems   Constitutional: Negative for chills and fever.   HENT: Positive for hearing loss (s/p evaluation ) and tinnitus. Negative for congestion, ear pain and sore throat.    Eyes: Negative for pain and visual disturbance.   Respiratory: Negative for cough and shortness of breath.    Cardiovascular: Negative for chest pain, palpitations and peripheral edema.   Gastrointestinal: Positive for constipation (occasional d/t diet) and heartburn (occasional. d/t diet). Negative for abdominal pain, diarrhea, hematochezia and nausea.   Genitourinary: Positive for impotence. Negative for dysuria, frequency, genital sores, hematuria, penile discharge and urgency.   Musculoskeletal: Positive for arthralgias (normal ). Negative for joint swelling and myalgias.  "  Skin: Negative for rash.   Neurological: Positive for headaches (occasional, d/t stress with wife's health). Negative for dizziness, weakness and paresthesias.   Psychiatric/Behavioral: Negative for mood changes. The patient is not nervous/anxious.          OBJECTIVE:   /74 (BP Location: Left arm, Patient Position: Chair, Cuff Size: Adult Large)   Pulse 70   Temp 97.9  F (36.6  C) (Tympanic)   Resp 17   Wt 103 kg (227 lb)   SpO2 96%   BMI 32.57 kg/m   Estimated body mass index is 32.57 kg/m  as calculated from the following:    Height as of 8/2/19: 1.778 m (5' 10\").    Weight as of this encounter: 103 kg (227 lb).  Physical Exam  Constitutional:       General: He is not in acute distress.     Appearance: He is well-developed.   HENT:      Head: Normocephalic and atraumatic.      Right Ear: Tympanic membrane normal.      Left Ear: Tympanic membrane normal.      Mouth/Throat:      Mouth: Mucous membranes are moist.      Pharynx: No oropharyngeal exudate.   Eyes:      Extraocular Movements: Extraocular movements intact.      Conjunctiva/sclera: Conjunctivae normal.   Neck:      Thyroid: No thyromegaly.   Cardiovascular:      Rate and Rhythm: Normal rate and regular rhythm.      Pulses: Normal pulses.      Heart sounds: No murmur heard.  Pulmonary:      Effort: Pulmonary effort is normal. No respiratory distress.      Breath sounds: No wheezing or rales.   Abdominal:      General: Bowel sounds are normal. There is no distension.      Palpations: Abdomen is soft.      Tenderness: There is no abdominal tenderness. There is no guarding.   Musculoskeletal:         General: Normal range of motion.      Cervical back: Normal range of motion and neck supple.      Right lower leg: No edema.      Left lower leg: No edema.   Lymphadenopathy:      Cervical: No cervical adenopathy.   Skin:     General: Skin is warm and dry.   Neurological:      Mental Status: He is alert.   Psychiatric:         Mood and Affect: Mood " normal.         Diagnostic Test Results:  [facility]    ASSESSMENT / PLAN:   (Z00.00) Routine general medical examination at a health care facility  (primary encounter diagnosis)  Comment: [facility]  Plan: repeat exam in one year    (E78.00) Pure hypercholesterolemia  Comment: per Dann's report lab work was good  Plan: c/w atorvastatin         COUNSELING:  Reviewed preventive health counseling, as reflected in patient instructions        He reports that he has quit smoking. He has never used smokeless tobacco.      Appropriate preventive services were discussed with this patient, including applicable screening as appropriate for cardiovascular disease, diabetes, osteopenia/osteoporosis, and glaucoma.  As appropriate for age/gender, discussed screening for colorectal cancer, prostate cancer, breast cancer, and cervical cancer. Checklist reviewing preventive services available has been given to the patient.    Reviewed patients plan of care and provided an AVS. The Basic Care Plan (routine screening as documented in Health Maintenance) for Dann meets the Care Plan requirement. This Care Plan has been established and reviewed with the Patient.          Nelly Mirza MD  [facility] [place]    Identified Health Risks:    I have reviewed Opioid Use Disorder and Substance Use Disorder risk factors and made any needed referrals. - no referrals are required

## 2023-04-25 NOTE — PATIENT INSTRUCTIONS
[facility]      Preventive Health Recommendations:     See your health care provider every year to  Review health changes.   Discuss preventive care.    Review your medicines if your doctor has prescribed any.    Talk with your health care provider about whether you should have a test to screen for prostate cancer (PSA).  Every 3 years, have a diabetes test (fasting glucose). If you are at risk for diabetes, you should have this test more often.  Every 5 years, have a cholesterol test. Have this test more often if you are at risk for high cholesterol or heart disease.   Every 10 years, have a colonoscopy. Or, have a yearly FIT test (stool test). These exams will check for colon cancer.  Talk to with your health care provider about screening for Abdominal Aortic Aneurysm if you have a family history of AAA or have a history of smoking.    Shots:   Get a flu shot each year.   Get a tetanus shot every 10 years.   Talk to your doctor about your pneumonia vaccines. There are now two you should receive - Pneumovax (PPSV 23) and Prevnar (PCV 13).   Talk to your pharmacist about a shingles vaccine.   Talk to your doctor about the hepatitis B vaccine.  Nutrition:   Eat at least 5 servings of fruits and vegetables each day.   Eat whole-grain bread, whole-wheat pasta and brown rice instead of white grains and rice.   Get adequate Calcium and Vitamin D.   Lifestyle  Exercise for at least 150 minutes a week (30 minutes a day, 5 days a week). This will help you control your weight and prevent disease.   Limit alcohol to one drink per day.   No smoking.   Wear sunscreen to prevent skin cancer.  See your dentist every six months for an exam and cleaning.  See your eye doctor every 1 to 2 years to screen for conditions such as glaucoma, macular degeneration, cataracts, etc.    Personalized Prevention Plan  You are due for the preventive services outlined below.  Your care team is available to assist  you in scheduling these services.  If you have already completed any of these items, please share that information with your care team to update in your medical record.  Health Maintenance Due   Topic Date Due    Diptheria Tetanus Pertussis (DTAP/TDAP/TD) Vaccine (1 - Tdap) Never done    Zoster (Shingles) Vaccine (1 of 2) Never done    Colorectal Cancer Screening  06/06/2018    Pneumococcal Vaccine (2 - PCV) 09/24/2018    Stool guaic - yearly  06/05/2019    Cholesterol Lab  10/06/2021    Flu Vaccine (1) 09/01/2022

## 2023-04-26 PROBLEM — H02.834 DERMATOCHALASIS OF BOTH UPPER EYELIDS: Status: ACTIVE | Noted: 2022-11-08

## 2023-04-26 PROBLEM — H52.7 AMETROPIA: Status: ACTIVE | Noted: 2022-11-08

## 2023-04-26 PROBLEM — H02.831 DERMATOCHALASIS OF BOTH UPPER EYELIDS: Status: ACTIVE | Noted: 2022-11-08

## 2023-04-26 PROBLEM — H53.10 SUBJECTIVE VISUAL DISTURBANCE: Status: ACTIVE | Noted: 2022-11-08

## 2023-04-26 PROBLEM — H57.819 BROW PTOSIS: Status: ACTIVE | Noted: 2022-11-08

## 2023-04-26 PROBLEM — Z86.69 HISTORY OF MIGRAINE: Status: ACTIVE | Noted: 2022-11-08

## 2023-04-26 PROBLEM — H25.813 COMBINED FORMS OF AGE-RELATED CATARACT OF BOTH EYES: Status: ACTIVE | Noted: 2022-11-08

## 2023-04-27 ENCOUNTER — OFFICE VISIT (OUTPATIENT)
Dept: FAMILY MEDICINE | Facility: OTHER | Age: 78
End: 2023-04-27
Attending: FAMILY MEDICINE
Payer: COMMERCIAL

## 2023-04-27 VITALS
HEART RATE: 70 BPM | BODY MASS INDEX: 32.57 KG/M2 | OXYGEN SATURATION: 96 % | DIASTOLIC BLOOD PRESSURE: 74 MMHG | TEMPERATURE: 97.9 F | SYSTOLIC BLOOD PRESSURE: 138 MMHG | WEIGHT: 227 LBS | RESPIRATION RATE: 17 BRPM

## 2023-04-27 DIAGNOSIS — Z00.00 ROUTINE GENERAL MEDICAL EXAMINATION AT A HEALTH CARE FACILITY: Primary | ICD-10-CM

## 2023-04-27 DIAGNOSIS — E78.00 PURE HYPERCHOLESTEROLEMIA: ICD-10-CM

## 2023-04-27 PROCEDURE — G0439 PPPS, SUBSEQ VISIT: HCPCS | Performed by: FAMILY MEDICINE

## 2023-04-27 PROCEDURE — G0463 HOSPITAL OUTPT CLINIC VISIT: HCPCS | Mod: 25

## 2023-04-27 ASSESSMENT — ENCOUNTER SYMPTOMS
PARESTHESIAS: 0
SHORTNESS OF BREATH: 0
HEMATOCHEZIA: 0
ABDOMINAL PAIN: 0
WEAKNESS: 0
ARTHRALGIAS: 1
CONSTIPATION: 1
HEADACHES: 1
FEVER: 0
DYSURIA: 0
DIZZINESS: 0
PALPITATIONS: 0
SORE THROAT: 0
CHILLS: 0
NAUSEA: 0
COUGH: 0
FREQUENCY: 0
NERVOUS/ANXIOUS: 0
EYE PAIN: 0
JOINT SWELLING: 0
MYALGIAS: 0
HEARTBURN: 1
DIARRHEA: 0
HEMATURIA: 0

## 2023-04-27 ASSESSMENT — PAIN SCALES - GENERAL: PAINLEVEL: EXTREME PAIN (8)

## 2023-04-27 ASSESSMENT — ACTIVITIES OF DAILY LIVING (ADL): CURRENT_FUNCTION: NO ASSISTANCE NEEDED

## 2023-07-20 NOTE — TELEPHONE ENCOUNTER
norco      Last Written Prescription Date: 12/7/17  Last Fill Quantity: 60,  # refills: 0   Last Office Visit with G, P or [facility] prescribing provider: 9/29/17                                                Other

## 2023-08-28 DIAGNOSIS — M10.00 IDIOPATHIC GOUT, UNSPECIFIED CHRONICITY, UNSPECIFIED SITE: ICD-10-CM

## 2023-08-29 RX ORDER — ALLOPURINOL 300 MG/1
TABLET ORAL
Qty: 90 TABLET | Refills: 3 | Status: SHIPPED | OUTPATIENT
Start: 2023-08-29 | End: 2024-08-27

## 2023-08-29 NOTE — TELEPHONE ENCOUNTER
allopurinol (ZYLOPRIM) 300 MG tablet 90 tablet 2 11/18/2022   .  Last Office Visit: 04/27/2023  Future Office visit:       Routing refill request to provider for review/approval because:

## 2023-09-19 ENCOUNTER — NURSE TRIAGE (OUTPATIENT)
Dept: FAMILY MEDICINE | Facility: OTHER | Age: 78
End: 2023-09-19

## 2023-09-19 NOTE — PROGRESS NOTES
Assessment & Plan     Midline low back pain with bilateral sciatica, unspecified chronicity  Resolved. Most likely d/t constipaton     Constipation, unspecified constipation type  Okay to use your stool softners    Adjustment insomnia  Okay to use melatonin     See Patient Instructions    Return if symptoms worsen or fail to improve.    Nelly Mirza MD  [facility] [place] Quigley is a 77 year old, presenting for the following health issues:  Back Pain        9/21/2023     1:07 PM   Additional Questions   Roomed by Dulce Blount   Accompanied by none         9/21/2023     1:07 PM   Patient Reported Additional Medications   Patient reports taking the following new medications none       HPI         Back Pain     Duration: 5-6 days        Specific cause: no  Description:   Location of pain: low back left  Character of pain: constant  Pain radiation:none  New numbness or weakness in legs, not attributed to pain:  no   Intensity: Currently 4/10  History:   Pain interferes with job: Not applicable  History of back problems: lumbar vertebrae compressed   Any previous MRI or X-rays: Yes[facility]  Date unknown 8-10 years ago  Sees a specialist for back pain:  No  Therapies tried without relief: NSAIDs  Alleviating factors:   Improved by: NSAIDs    Precipitating factors:  Worsened by: Lifting    - patient states pain has moved to the left flank/ low abdominal area  - improving with bowel movements     # Not sleeping well  - wife is now at NH permanently, cannot walk. She some time does not know who he is   - goes to bed at 11:30 and gets up by 5AM.  Often up in the middle of night d/t worrying  - nap at 6pm      Review of Systems   Constitutional:  Negative for chills and fever.   HENT:  Negative for congestion.    Respiratory:  Negative for shortness of breath and wheezing.    Cardiovascular:  Negative for chest pain and palpitations.   Gastrointestinal:  Positive for abdominal  pain (left side, most likely d/t constipation).   Genitourinary:  Negative for hematuria.   Musculoskeletal:  Negative for back pain.   Psychiatric/Behavioral:  Positive for sleep disturbance.           Objective    /80 (BP Location: Right arm, Patient Position: Sitting, Cuff Size: Adult Regular)   Pulse 66   Temp 98.3  F (36.8  C) (Tympanic)   Wt 104.4 kg (230 lb 3.2 oz)   SpO2 96%   BMI 33.03 kg/m    Body mass index is 33.03 kg/m .  Physical Exam  Constitutional:       General: He is not in acute distress.     Appearance: He is not ill-appearing.   Cardiovascular:      Rate and Rhythm: Normal rate and regular rhythm.      Pulses: Normal pulses.      Heart sounds: Murmur heard.   Pulmonary:      Effort: Pulmonary effort is normal. No respiratory distress.      Breath sounds: No wheezing or rales.   Abdominal:      General: Bowel sounds are normal. There is no distension.      Tenderness: There is abdominal tenderness (very mild) in the left lower quadrant.   Musculoskeletal:      Comments: Back: No tenderness to palpation over vertebral bodies.  No tenderness to palpation over lumbar paraspinal muscles.   Neurological:      Mental Status: He is alert.

## 2023-09-19 NOTE — TELEPHONE ENCOUNTER
Reason for Disposition   Age > 50 and no history of prior similar back pain    Additional Information   Negative: Passed out (i.e., fainted, collapsed and was not responding)   Negative: Shock suspected (e.g., cold/pale/clammy skin, too weak to stand, low BP, rapid pulse)   Negative: Sounds like a life-threatening emergency to the triager   Negative: Major injury to the back (e.g., MVA, fall > 10 feet or 3 meters, penetrating injury, etc.)   Negative: Pain in the upper back over the ribs (rib cage) that radiates (travels) into the chest   Negative: Pain in the upper back over the ribs (rib cage) and worsened by coughing (or clearly increases with breathing)   Negative: Back pain during pregnancy   Negative: SEVERE back pain of sudden onset and age > 60 years   Negative: SEVERE abdominal pain (e.g., excruciating)   Negative: Abdominal pain and age > 60 years   Negative: Unable to urinate (or only a few drops) and bladder feels very full   Negative: Loss of bladder or bowel control (urine or bowel incontinence; wetting self, leaking stool) of new-onset   Negative: Numbness (loss of sensation) in groin or rectal area   Negative: Pain radiates into groin, scrotum   Negative: Blood in urine (red, pink, or tea-colored)   Negative: Vomiting and pain over lower ribs of back (i.e., flank - kidney area)   Negative: Weakness of a leg or foot (e.g., unable to bear weight, dragging foot)   Negative: Patient sounds very sick or weak to the triager   Negative: Fever > 100.4 F (38.0 C) and flank pain   Negative: Pain or burning with passing urine (urination)   Negative: SEVERE back pain (e.g., excruciating, unable to do any normal activities) and not improved after pain medicine and CARE ADVICE   Negative: Numbness in an arm or hand (i.e., loss of sensation) and upper back pain   Negative: Numbness in a leg or foot (i.e., loss of sensation)   Negative: High-risk adult (e.g., history of cancer, history of HIV, or history of IV Drug  "Use)   Negative: Soft tissue infection (e.g., abscess, cellulitis) or other serious infection (e.g., bacteremia) in last 2 weeks   Negative: Painful rash with multiple small blisters grouped together (i.e., dermatomal distribution or 'band' or 'stripe')   Negative: Pain radiates into the thigh or further down the leg, and in both legs    Answer Assessment - Initial Assessment Questions  1. ONSET: \"When did the pain begin?\"       4-5 days ago  2. LOCATION: \"Where does it hurt?\" (upper, mid or lower back)      Lower left  3. SEVERITY: \"How bad is the pain?\"  (e.g., Scale 1-10; mild, moderate, or severe)    - MILD (1-3): Doesn't interfere with normal activities.     - MODERATE (4-7): Interferes with normal activities or awakens from sleep.     - SEVERE (8-10): Excruciating pain, unable to do any normal activities.       moderate  4. PATTERN: \"Is the pain constant?\" (e.g., yes, no; constant, intermittent)       constant  5. RADIATION: \"Does the pain shoot into your legs or somewhere else?\"      no  6. CAUSE:  \"What do you think is causing the back pain?\"       unsure  7. BACK OVERUSE:  \"Any recent lifting of heavy objects, strenuous work or exercise?\"      no  8. MEDICINES: \"What have you taken so far for the pain?\" (e.g., nothing, acetaminophen, NSAIDS)      Aspirin- does relief some pain  9. NEUROLOGIC SYMPTOMS: \"Do you have any weakness, numbness, or problems with bowel/bladder control?\"      no  10. OTHER SYMPTOMS: \"Do you have any other symptoms?\" (e.g., fever, abdomen pain, burning with urination, blood in urine)        no  11. PREGNANCY: \"Is there any chance you are pregnant?\" \"When was your last menstrual period?\"        N/a    Protocols used: Back Pain-A-[place]    "

## 2023-09-21 ENCOUNTER — OFFICE VISIT (OUTPATIENT)
Dept: FAMILY MEDICINE | Facility: OTHER | Age: 78
End: 2023-09-21
Attending: FAMILY MEDICINE
Payer: COMMERCIAL

## 2023-09-21 VITALS
TEMPERATURE: 98.3 F | BODY MASS INDEX: 33.03 KG/M2 | HEART RATE: 66 BPM | DIASTOLIC BLOOD PRESSURE: 80 MMHG | OXYGEN SATURATION: 96 % | SYSTOLIC BLOOD PRESSURE: 130 MMHG | WEIGHT: 230.2 LBS

## 2023-09-21 DIAGNOSIS — K59.00 CONSTIPATION, UNSPECIFIED CONSTIPATION TYPE: ICD-10-CM

## 2023-09-21 DIAGNOSIS — M54.41 MIDLINE LOW BACK PAIN WITH BILATERAL SCIATICA, UNSPECIFIED CHRONICITY: Primary | ICD-10-CM

## 2023-09-21 DIAGNOSIS — F51.02 ADJUSTMENT INSOMNIA: ICD-10-CM

## 2023-09-21 DIAGNOSIS — M54.42 MIDLINE LOW BACK PAIN WITH BILATERAL SCIATICA, UNSPECIFIED CHRONICITY: Primary | ICD-10-CM

## 2023-09-21 PROCEDURE — G0463 HOSPITAL OUTPT CLINIC VISIT: HCPCS

## 2023-09-21 PROCEDURE — 99213 OFFICE O/P EST LOW 20 MIN: CPT | Performed by: FAMILY MEDICINE

## 2023-09-21 ASSESSMENT — ENCOUNTER SYMPTOMS
CHILLS: 0
WHEEZING: 0
FEVER: 0
SLEEP DISTURBANCE: 1
ABDOMINAL PAIN: 1
BACK PAIN: 0
PALPITATIONS: 0
HEMATURIA: 0
SHORTNESS OF BREATH: 0

## 2023-09-21 ASSESSMENT — PAIN SCALES - GENERAL: PAINLEVEL: MODERATE PAIN (4)

## 2023-09-21 NOTE — PATIENT INSTRUCTIONS
Okay to use melatonin 3mg one hour before you want to go to bed    Okay to take your stool softners

## 2023-10-06 ENCOUNTER — HOSPITAL ENCOUNTER (EMERGENCY)
Facility: HOSPITAL | Age: 78
Discharge: HOME | End: 2023-10-06
Attending: NURSE PRACTITIONER | Admitting: NURSE PRACTITIONER
Payer: COMMERCIAL

## 2023-10-06 ENCOUNTER — APPOINTMENT (OUTPATIENT)
Dept: CT IMAGING | Facility: HOSPITAL | Age: 78
End: 2023-10-06
Attending: NURSE PRACTITIONER
Payer: COMMERCIAL

## 2023-10-06 VITALS
DIASTOLIC BLOOD PRESSURE: 79 MMHG | BODY MASS INDEX: 33.07 KG/M2 | HEIGHT: 70 IN | OXYGEN SATURATION: 96 % | WEIGHT: 231 LBS | SYSTOLIC BLOOD PRESSURE: 151 MMHG | RESPIRATION RATE: 16 BRPM | HEART RATE: 67 BPM | TEMPERATURE: 98.8 F

## 2023-10-06 DIAGNOSIS — K59.00 CONSTIPATION, UNSPECIFIED CONSTIPATION TYPE: ICD-10-CM

## 2023-10-06 DIAGNOSIS — M54.50 LEFT-SIDED LOW BACK PAIN WITHOUT SCIATICA, UNSPECIFIED CHRONICITY: Primary | ICD-10-CM

## 2023-10-06 LAB
ALBUMIN UR-MCNC: NEGATIVE MG/DL
APPEARANCE UR: CLEAR
BILIRUB UR QL STRIP: NEGATIVE
COLOR UR AUTO: NORMAL
GLUCOSE UR STRIP-MCNC: NEGATIVE MG/DL
HGB UR QL STRIP: NEGATIVE
KETONES UR STRIP-MCNC: NEGATIVE MG/DL
LEUKOCYTE ESTERASE UR QL STRIP: NEGATIVE
NITRATE UR QL: NEGATIVE
PH UR STRIP: 6.5 [PH] (ref 4.7–8)
RBC URINE: 0 /HPF
SP GR UR STRIP: 1.01 (ref 1–1.03)
SQUAMOUS EPITHELIAL: 0 /HPF
UROBILINOGEN UR STRIP-MCNC: NORMAL MG/DL
WBC URINE: <1 /HPF

## 2023-10-06 PROCEDURE — 99213 OFFICE O/P EST LOW 20 MIN: CPT | Performed by: NURSE PRACTITIONER

## 2023-10-06 PROCEDURE — 81001 URINALYSIS AUTO W/SCOPE: CPT | Performed by: NURSE PRACTITIONER

## 2023-10-06 PROCEDURE — G0463 HOSPITAL OUTPT CLINIC VISIT: HCPCS | Mod: 25

## 2023-10-06 PROCEDURE — 74176 CT ABD & PELVIS W/O CONTRAST: CPT

## 2023-10-06 ASSESSMENT — ENCOUNTER SYMPTOMS
DYSURIA: 0
ABDOMINAL PAIN: 0
VOMITING: 0
NAUSEA: 1
HEMATURIA: 0
DIARRHEA: 0
CHILLS: 0
FEVER: 0
FLANK PAIN: 1
CONSTIPATION: 0

## 2023-10-06 NOTE — ED PROVIDER NOTES
History     Chief Complaint   Patient presents with    Back Pain     Lower left side back pain       HPI  Dann Espinoza is a 77 year old male who presents ambulatory to urgent care for evaluation of left lower back pain that he reports started 3 to 4 weeks ago.  He was seen by his primary doctor about 1.5 weeks ago and at that time it was thought to be related to constipation.  Symptoms appear to have resolved after he had a good bowel movement.  Patient presents today noting that he has continued to have pain to this area and today it progressively worsened.  Pain does not radiate down his legs or to his abdomen.  Pain is not affecting ambulation.  He denies any abdominal pain.  He did have some nausea this morning when the pain got worse.  No vomiting or diarrhea.  Normal bowel movements per his report.  Denies dysuria or hematuria.  Reports history of back problems with some fractures of his spine.  He has never had to have surgery of his back.  Denies history of surgeries of his abdomen.    Movement appears to make the pain worse.  Aspirin was initially helping with the pain.  He denies any recent trauma or injury to his back.  No known history of kidney stones.      Patient tells me that the pain feels like it is deeper and he does not believe that it has anything to do with his back or constipation.    Allergies:  Allergies   Allergen Reactions    Azithromycin Rash     He developed a rash when on both Azithromycin and Ceftriaxone- unsure which one may have been the culprit      Ceftriaxone Rash     He developed a rash when he was on Ceftriaxone + Azithromycin    Levaquin [Levofloxacin] Rash       Problem List:    Patient Active Problem List    Diagnosis Date Noted    Ametropia 11/08/2022     Priority: Medium    Brow ptosis 11/08/2022     Priority: Medium    Combined forms of age-related cataract of both eyes 11/08/2022     Priority: Medium    Dermatochalasis of both upper eyelids 11/08/2022     Priority:  Medium    History of migraine 11/08/2022     Priority: Medium    Subjective visual disturbance 11/08/2022     Priority: Medium     Formatting of this note might be different from the original.  int. shimmering lights, history of migraine      Vertebrobasilar circulation transient ischemic attack 12/13/2018     Priority: Medium    Senile incipient cataract, unspecified laterality 10/05/2017     Priority: Medium    Pneumonia of right lower lobe due to infectious organism 09/21/2017     Priority: Medium    Sepsis, due to unspecified organism 09/21/2017     Priority: Medium    Pneumonia 09/21/2017     Priority: Medium    Aortic root dilation (H) - echo (11/2022) stable 05/23/2016     Priority: Medium    Osteoarthritis of hip 04/01/2016     Priority: Medium    Coxitis 03/30/2016     Priority: Medium    Vitreous hemorrhage (H) 07/09/2015     Priority: Medium    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  Dann Espinoza has no plan or code status on file. Discussed available resources and provided with information. Confirmed code status reflects current choices pending further ACP discussions.  Confirmed/documented legally designated decision makers.  Added by ROSA HAYWARD            Elevated prostate specific antigen (PSA) 06/01/2011     Priority: Medium     Overview:   IMO Update 10/11      Lumbago 01/12/2011     Priority: Medium    Asbestosis (H) 01/12/2011     Priority: Medium      Problem list name updated by automated process. Provider to review      Hyperlipidemia 01/12/2011     Priority: Medium     Overview:   IMO Update 10/11  Problem list name updated by automated process. Provider to review      Constipation 04/16/2004     Priority: Medium     Overview:   Occasional, then gets some bright red blood per rectum   IMO Update      Gout 01/17/2003     Priority: Medium     Overview:   Hx of  Problem list name updated by automated process. Provider to review          Past Medical History:    Past Medical History:   Diagnosis Date    Asbestosis(501) 01/12/2011    Gout, unspecified 01/17/2003    Lumbago 01/12/2011    Other and unspecified hyperlipidemia 01/12/2011    Psychosexual dysfunction, unspecified 01/12/2011       Past Surgical History:    Past Surgical History:   Procedure Laterality Date    COLONOSCOPY  07-    Rectal Bleeding > Repeat 3 years (2011)    excision of skin cancer      HEMORRHOIDECTOMY      ZZC TOTAL HIP ARTHROPLASTY Left 04/15/2019    [facility]       Family History:    Family History   Problem Relation Age of Onset    Crohn's Disease Father     Cancer Father 86        Liver - Cause of Death    Heart Disease Father     Heart Disease Brother     Diabetes Brother 49        Cause of Death    Cerebrovascular Disease Mother         CVA    Other - See Comments Mother         AAA    Heart Disease Mother     Heart Disease Sister        Social History:  Marital Status:   [2]  Social History     Tobacco Use    Smoking status: Former    Smokeless tobacco: Never    Tobacco comments:     Quit 50 years ago (2022)   Vaping Use    Vaping Use: Never used   Substance Use Topics    Alcohol use: No    Drug use: No        Medications:    allopurinol (ZYLOPRIM) 300 MG tablet  aspirin (ASA) 81 MG EC tablet  ATORVASTATIN CALCIUM PO  INDOMETHACIN PO  tamsulosin (FLOMAX) 0.4 MG capsule          Review of Systems   Constitutional:  Negative for chills and fever.   Gastrointestinal:   "Positive for nausea. Negative for abdominal pain, constipation, diarrhea and vomiting.   Genitourinary:  Positive for flank pain. Negative for dysuria and hematuria.   Musculoskeletal:  Negative for gait problem.   All other systems reviewed and are negative.      Physical Exam   BP: 151/79  Pulse: 67  Temp: 98.8  F (37.1  C)  Resp: 16  Height: 177.8 cm (5' 10\")  Weight: 104.8 kg (231 lb)  SpO2: 96 %      Physical Exam  Vitals and nursing note reviewed.   Constitutional:       General: He is not in acute distress.     Appearance: Normal appearance. He is well-developed. He is obese. He is not diaphoretic.   HENT:      Head: Normocephalic and atraumatic.   Eyes:      Pupils: Pupils are equal, round, and reactive to light.   Cardiovascular:      Rate and Rhythm: Normal rate and regular rhythm.      Heart sounds: Normal heart sounds.   Pulmonary:      Effort: Pulmonary effort is normal.      Breath sounds: Normal breath sounds.   Abdominal:      General: Bowel sounds are normal.      Palpations: Abdomen is soft.      Tenderness: There is no abdominal tenderness. There is no right CVA tenderness, left CVA tenderness, guarding or rebound.      Hernia: No hernia is present.   Musculoskeletal:      Cervical back: Normal range of motion and neck supple. Tenderness present. No bony tenderness. No pain with movement.      Thoracic back: Normal. No tenderness or bony tenderness. Normal range of motion.      Lumbar back: Tenderness present. No swelling, edema, deformity, signs of trauma, spasms or bony tenderness. Normal range of motion. Negative right straight leg raise test and negative left straight leg raise test.        Back:       Comments: No step-offs or edema.  Localized tenderness to the left low back.  No erythema, bruising or skin lesions appreciated to this area.    Able to get from sitting to standing position independently with minimal difficulty.  Ambulating in the room with minimal difficulty.   Skin:     " General: Skin is warm and dry.      Coloration: Skin is not pale.   Neurological:      Mental Status: He is alert and oriented to person, place, and time.         ED Course                 Procedures              Results for orders placed or performed during the hospital encounter of 10/06/23 (from the past 24 hour(s))   UA with Microscopic reflex to Culture    Specimen: Urine, Midstream   Result Value Ref Range    Color Urine Straw Colorless, Straw, Light Yellow, Yellow    Appearance Urine Clear Clear    Glucose Urine Negative Negative mg/dL    Bilirubin Urine Negative Negative    Ketones Urine Negative Negative mg/dL    Specific Gravity Urine 1.011 1.003 - 1.035    Blood Urine Negative Negative    pH Urine 6.5 4.7 - 8.0    Protein Albumin Urine Negative Negative mg/dL    Urobilinogen Urine Normal Normal, 2.0 mg/dL    Nitrite Urine Negative Negative    Leukocyte Esterase Urine Negative Negative    RBC Urine 0 <=2 /HPF    WBC Urine <1 <=5 /HPF    Squamous Epithelials Urine 0 <=1 /HPF    Narrative    Urine Culture not indicated   CT Abdomen Pelvis w/o Contrast    Narrative    EXAM: CT ABDOMEN PELVIS W/O CONTRAST 10/6/2023 12:20 PM    HISTORY: left flank pain x 3-4 weeks; initially thought to be related  to constipation but now is worsening. no constipation today.    COMPARISON: Lumbar MR 8/9/2019    TECHNIQUE:   Imaging protocol: Computed tomography of abdomen and pelvis without  contrast.  Acquisition: This CT exam was performed using one or more the  following dose reduction techniques: automated exposure control,  adjustment of the mA and/or kV according to patient size, and/or  iterative reconstruction technique.    FINDINGS:    LOWER CHEST:  Bibasilar atelectasis. No suspicious pulmonary nodules or masses.  Calcified mediastinal and hilar lymph nodes. Left basilar granuloma.    ABDOMEN/PELVIS:  LIVER: Normal contour. No suspicious liver lesions.  GALLBLADDER: No radio-opaque stones. No gallbladder wall  thickening.  BILE DUCTS: No biliary tract dilatation.  PANCREAS: Within normal limits.  SPLEEN: Within normal limits.  ADRENALS: Within normal limits.    KIDNEYS/URETERS: No suspicious mass, nephrolithiasis, or  hydronephrosis.  URINARY BLADDER: Within normal limits.  REPRODUCTIVE ORGANS: No pelvic masses.    BOWEL: No bowel dilatation or wall thickening. Normal appendix.  Moderate volume of stool.  PERITONEUM/RETROPERITONEUM: No free air or free fluid.  VESSELS: Atherosclerotic calcification of the aorta without aneurysmal  dilation.  LYMPH NODES: There are no pathologically enlarged lymph nodes.    BONES AND SOFT TISSUES:  No suspicious osseous lesions. Degenerative periarticular changes and  spinal spondylosis. Left liver with a paucity changes with streak  artifact partially limiting evaluation of surrounding tissues.      Impression    IMPRESSION:  No soft tissue mass or acute focal inflammatory change in the abdomen  or pelvis. Moderate stool burden.    MARYANN SANFORD MD         SYSTEM ID:  [id number]       Medications - No data to display    Assessments & Plan (with Medical Decision Making)   This is a pleasant and well-appearing 77-year-old male that presented with concerns of persistent left low back pain that started 3 to 4 weeks ago.  Patient with a soft and not tender abdomen on palpation.  Localized tenderness to the left lower back with no bruising, skin lesions or swelling appreciated to this area.  He has no CVA tenderness.  Pain reportedly worsens with movement.  The patient presented with concerns that pain is related to his kidneys.  His urinalysis today was negative for infection or any other acute findings.  CT abdomen and pelvis without contrast was completed which was negative for any acute focal inflammatory changes in the abdomen or pelvis.  He was however noted to have a moderate stool burden.  Patient does have a history of constipation.  He tells me he did take 2 laxatives a couple days ago  but is unable to tell me if he had good results with that.  He does take stool softeners daily.      Pain likely due to muscular etiology.  He declined any treatment during this visit.  He will continue taking laxatives at home as well as the stool softeners  For constipation.  Recommended that he try lidocaine patches to the area. Tylenol or ibuprofen as needed for the pain.  He was advised to keep scheduled appointment with his primary doctor on 10/17/2023 for reevaluation.  If his symptoms worsen or he develops any other concerning symptoms, patient was advised to return to urgent care or emergency department for evaluation.  Patient verbalized understanding and is in agreement with this plan of care.    I have reviewed the nursing notes.    I have reviewed the findings, diagnosis, plan and need for follow up with the patient.  This document was prepared using a combination of typing and voice generated software.  While every attempt was made for accuracy, spelling and grammatical errors may exist.         New Prescriptions    No medications on file       Final diagnoses:   Left-sided low back pain without sciatica, unspecified chronicity   Constipation, unspecified constipation type       10/6/2023   [facility]       Mpofu, Prudence, CNP  10/06/23 [id number]

## 2023-10-06 NOTE — ED TRIAGE NOTES
"  Patient presents to urgent care for left flank pain that started about 3-4 weeks ago. Patient saw Dr. Mirza last week, he states, \"she thought he could be bound up\"  Patient denies a urine test.  patient states, \"the pain is 8/10 with movement\"   Patient has appointment with Dr. Mirza on the 17th of October.  "

## 2023-10-06 NOTE — DISCHARGE INSTRUCTIONS
Your urine does not show any signs of infection.  Your CT scan does not show anything concerning with your kidneys.  You do have a lot of stool in your abdomen.      Continue taking stool softeners and laxatives.    You can try a lidocaine patch to the area of your back that hurts.  You can continue taking aspirin or ibuprofen or Tylenol as needed for the pain.    Keep scheduled appointment with Dr. Mirza on October 17 for reevaluation.    Return to urgent care or emergency department for any worsening or concerning symptoms.

## 2023-10-16 NOTE — PROGRESS NOTES
Assessment & Plan     Midline low back pain with bilateral sciatica, unspecified chronicity  Sudden onset. Pain has not changed. Will trial muscle relaxer. Declines PT   - XR LUMBAR SPINE 2/3 VIEWS (Clinic Performed); Future  - XR Thoracic Spine 2 Views (Clinic Performed); Future  - methocarbamol (ROBAXIN) 500 MG tablet; Take 1 tablet (500 mg) by mouth 3 times daily as needed for muscle spasms  - MRI lumbar order placed  - IR referral placed     See Patient Instructions    Return if symptoms worsen or fail to improve.    Nelly Mirza MD  [facility] - ANGELICA Quigley is a 78 year old, presenting for the following health issues:  Back Pain      HPI       Back Pain     Duration: f/u        Specific cause: woke up with it  Description:   Location of pain: low back left  Character of pain: sharp, constant pain, and constant  Pain radiation:none  New numbness or weakness in legs, not attributed to pain:  no   Intensity: Currently 8/10  History:   Pain interferes with job: Not applicable  History of back problems: Broke his back 20 years ago  Any previous MRI or X-rays: None  Sees a specialist for back pain:  No  Therapies tried without relief: (2) aspirin 325 mg, (2)acetaminophen (Tylenol) 650 mg, and NSAIDs (4) 200 twice daily  Alleviating factors:   Improved by: none    Precipitating factors:  Worsened by: Lifting, Bending, and Sitting      - having regular bowel movement  - pain is on lower lumbar   - duration of one month  - sudden onset of pain  - no change in pain   - no h/o sx  - h/o injections many years ago w/ good success   - declines PT  - fell on his stairs (25 years ago)          Review of Systems   Constitutional:  Negative for chills and fever.   HENT:  Negative for congestion.    Respiratory:  Negative for shortness of breath and wheezing.    Cardiovascular:  Negative for chest pain and palpitations.   Gastrointestinal:  Negative for abdominal pain.   Genitourinary:   Negative for difficulty urinating.   Musculoskeletal:  Positive for back pain.   Neurological:  Negative for weakness and paresthesias.          Objective    BP (!) 158/80   Pulse 66   Temp 97.8  F (36.6  C) (Tympanic)   Resp 17   Wt 107.2 kg (236 lb 4.8 oz)   SpO2 96%   BMI 33.91 kg/m    Body mass index is 33.91 kg/m .  Physical Exam  Constitutional:       General: He is not in acute distress.     Appearance: He is not ill-appearing.   Cardiovascular:      Rate and Rhythm: Normal rate and regular rhythm.      Pulses: Normal pulses.      Heart sounds: No murmur heard.  Pulmonary:      Effort: Pulmonary effort is normal. No respiratory distress.      Breath sounds: No wheezing or rales.   Musculoskeletal:      Comments: Back: No tenderness over vertebral bodies.  No tenderness to palpation over lower paraspinal muscles bilaterally.  Tenderness to palpation over mid paraspinal muscles   Neurological:      Mental Status: He is alert.          XR Thoracic Spine 2 Views (Clinic Performed)    Result Date: 10/17/2023  Exam: XR THORACIC SPINE 2 VIEWS Exam reason: Midline low back pain with bilateral sciatica, unspecified chronicity; Midline low back pain with bilateral sciatica, unspecified chronicity Technique: AP and lateral views were obtained. Comparison: None. Findings: No acute fracture or subluxation. Normal alignment. There are scattered mild degenerative changes of the thoracic spine. Paraspinous soft tissues are unremarkable.     Impression: No acute fracture or subluxation. MEGHANA WALKER MD   SYSTEM ID:  [id number]    XR LUMBAR SPINE 2/3 VIEWS (Clinic Performed)    Result Date: 10/17/2023  Exam: XR LUMBAR SPINE 2/3 VIEWS Exam Reason: Midline low back pain with bilateral sciatica, unspecified chronicity; Midline low back pain with bilateral sciatica, unspecified chronicity Comparison: 8/9/2019, 8/2/2019 Findings: AP, lateral, and lateral coned down to the lumbosacral junction radiographic views were  obtained. No acute fracture or subluxation. The vertebrae are normal in alignment. There is multilevel disc space narrowing with endplate osteophytes. There is facet arthropathy lower lumbar spine. The paravertebral soft tissues are normal.     IMPRESSION:  No acute fracture or subluxation. MEGHANA WALKER MD   SYSTEM ID:  [id number]

## 2023-10-17 ENCOUNTER — OFFICE VISIT (OUTPATIENT)
Dept: FAMILY MEDICINE | Facility: OTHER | Age: 78
End: 2023-10-17
Attending: FAMILY MEDICINE
Payer: COMMERCIAL

## 2023-10-17 ENCOUNTER — ANCILLARY PROCEDURE (OUTPATIENT)
Dept: GENERAL RADIOLOGY | Facility: OTHER | Age: 78
End: 2023-10-17
Attending: FAMILY MEDICINE
Payer: COMMERCIAL

## 2023-10-17 VITALS
RESPIRATION RATE: 17 BRPM | TEMPERATURE: 97.8 F | SYSTOLIC BLOOD PRESSURE: 158 MMHG | BODY MASS INDEX: 33.91 KG/M2 | OXYGEN SATURATION: 96 % | DIASTOLIC BLOOD PRESSURE: 80 MMHG | WEIGHT: 236.3 LBS | HEART RATE: 66 BPM

## 2023-10-17 DIAGNOSIS — M54.42 MIDLINE LOW BACK PAIN WITH BILATERAL SCIATICA, UNSPECIFIED CHRONICITY: ICD-10-CM

## 2023-10-17 DIAGNOSIS — M54.41 MIDLINE LOW BACK PAIN WITH BILATERAL SCIATICA, UNSPECIFIED CHRONICITY: ICD-10-CM

## 2023-10-17 DIAGNOSIS — M54.42 MIDLINE LOW BACK PAIN WITH BILATERAL SCIATICA, UNSPECIFIED CHRONICITY: Primary | ICD-10-CM

## 2023-10-17 DIAGNOSIS — M54.41 MIDLINE LOW BACK PAIN WITH BILATERAL SCIATICA, UNSPECIFIED CHRONICITY: Primary | ICD-10-CM

## 2023-10-17 PROCEDURE — G0463 HOSPITAL OUTPT CLINIC VISIT: HCPCS | Mod: 25

## 2023-10-17 PROCEDURE — 72070 X-RAY EXAM THORAC SPINE 2VWS: CPT | Mod: TC

## 2023-10-17 PROCEDURE — 99214 OFFICE O/P EST MOD 30 MIN: CPT | Performed by: FAMILY MEDICINE

## 2023-10-17 PROCEDURE — 72100 X-RAY EXAM L-S SPINE 2/3 VWS: CPT | Mod: TC

## 2023-10-17 RX ORDER — METHOCARBAMOL 500 MG/1
500 TABLET, FILM COATED ORAL 3 TIMES DAILY PRN
Qty: 45 TABLET | Refills: 1 | Status: SHIPPED | OUTPATIENT
Start: 2023-10-17 | End: 2024-06-03

## 2023-10-17 ASSESSMENT — ENCOUNTER SYMPTOMS
FEVER: 0
PARESTHESIAS: 0
SHORTNESS OF BREATH: 0
WHEEZING: 0
PALPITATIONS: 0
CHILLS: 0
ABDOMINAL PAIN: 0
DIFFICULTY URINATING: 0
BACK PAIN: 1
WEAKNESS: 0

## 2023-10-17 ASSESSMENT — PAIN SCALES - GENERAL: PAINLEVEL: EXTREME PAIN (9)

## 2023-10-17 NOTE — PATIENT INSTRUCTIONS
Okay to try methocarbamol 500mg every 8 hours as needed for back muscle pain. Be careful the medication can make you drowsy.    We will call you with your xray results.

## 2023-10-25 ENCOUNTER — HOSPITAL ENCOUNTER (OUTPATIENT)
Dept: MRI IMAGING | Facility: HOSPITAL | Age: 78
Discharge: HOME | End: 2023-10-25
Attending: FAMILY MEDICINE | Admitting: FAMILY MEDICINE
Payer: COMMERCIAL

## 2023-10-25 DIAGNOSIS — M54.42 MIDLINE LOW BACK PAIN WITH BILATERAL SCIATICA, UNSPECIFIED CHRONICITY: ICD-10-CM

## 2023-10-25 DIAGNOSIS — M54.41 MIDLINE LOW BACK PAIN WITH BILATERAL SCIATICA, UNSPECIFIED CHRONICITY: ICD-10-CM

## 2023-10-25 PROCEDURE — 72148 MRI LUMBAR SPINE W/O DYE: CPT

## 2023-10-30 ENCOUNTER — TELEPHONE (OUTPATIENT)
Dept: INTERVENTIONAL RADIOLOGY/VASCULAR | Facility: HOSPITAL | Age: 78
End: 2023-10-30

## 2023-11-02 ENCOUNTER — TELEPHONE (OUTPATIENT)
Dept: INTERVENTIONAL RADIOLOGY/VASCULAR | Facility: HOSPITAL | Age: 78
End: 2023-11-02

## 2023-11-03 ENCOUNTER — HOSPITAL ENCOUNTER (OUTPATIENT)
Dept: GENERAL RADIOLOGY | Facility: HOSPITAL | Age: 78
Discharge: HOME | End: 2023-11-03
Attending: FAMILY MEDICINE | Admitting: FAMILY MEDICINE
Payer: COMMERCIAL

## 2023-11-03 DIAGNOSIS — M54.41 MIDLINE LOW BACK PAIN WITH BILATERAL SCIATICA, UNSPECIFIED CHRONICITY: ICD-10-CM

## 2023-11-03 DIAGNOSIS — M54.42 MIDLINE LOW BACK PAIN WITH BILATERAL SCIATICA, UNSPECIFIED CHRONICITY: ICD-10-CM

## 2023-11-03 PROCEDURE — G0463 HOSPITAL OUTPT CLINIC VISIT: HCPCS

## 2023-11-10 ENCOUNTER — TELEPHONE (OUTPATIENT)
Dept: INTERVENTIONAL RADIOLOGY/VASCULAR | Facility: HOSPITAL | Age: 78
End: 2023-11-10

## 2023-11-10 NOTE — TELEPHONE ENCOUNTER
Left a message to remind patient of their noah on 11/14. Also reminded patient to not take any antibiotics, steroids, or immunizations two weeks before and after this appt. And they need a .     DONALD PORTILLO

## 2023-11-14 ENCOUNTER — HOSPITAL ENCOUNTER (OUTPATIENT)
Facility: HOSPITAL | Age: 78
Discharge: HOME | End: 2023-11-14
Attending: STUDENT IN AN ORGANIZED HEALTH CARE EDUCATION/TRAINING PROGRAM | Admitting: RADIOLOGY
Payer: COMMERCIAL

## 2023-11-14 ENCOUNTER — HOSPITAL ENCOUNTER (OUTPATIENT)
Dept: GENERAL RADIOLOGY | Facility: HOSPITAL | Age: 78
Discharge: HOME | End: 2023-11-14
Attending: FAMILY MEDICINE | Admitting: STUDENT IN AN ORGANIZED HEALTH CARE EDUCATION/TRAINING PROGRAM
Payer: COMMERCIAL

## 2023-11-14 DIAGNOSIS — M47.816 SPONDYLOSIS OF LUMBAR REGION WITHOUT MYELOPATHY OR RADICULOPATHY: ICD-10-CM

## 2023-11-14 DIAGNOSIS — M54.50 LOW BACK PAIN: ICD-10-CM

## 2023-11-14 PROCEDURE — 250N000011 HC RX IP 250 OP 636: Mod: JZ | Performed by: RADIOLOGY

## 2023-11-14 PROCEDURE — 64494 INJ PARAVERT F JNT L/S 2 LEV: CPT | Mod: LT,KX

## 2023-11-14 PROCEDURE — 250N000009 HC RX 250: Performed by: RADIOLOGY

## 2023-11-14 RX ORDER — IOPAMIDOL 612 MG/ML
15 INJECTION, SOLUTION INTRATHECAL ONCE
Status: COMPLETED | OUTPATIENT
Start: 2023-11-14 | End: 2023-11-14

## 2023-11-14 RX ORDER — LIDOCAINE HYDROCHLORIDE 10 MG/ML
15 INJECTION, SOLUTION EPIDURAL; INFILTRATION; INTRACAUDAL; PERINEURAL ONCE
Status: DISCONTINUED | OUTPATIENT
Start: 2023-11-14 | End: 2023-11-14

## 2023-11-14 RX ORDER — TRIAMCINOLONE ACETONIDE 40 MG/ML
40 INJECTION, SUSPENSION INTRA-ARTICULAR; INTRAMUSCULAR ONCE
Status: DISCONTINUED | OUTPATIENT
Start: 2023-11-14 | End: 2023-11-14

## 2023-11-14 RX ORDER — LIDOCAINE HYDROCHLORIDE 10 MG/ML
10 INJECTION, SOLUTION EPIDURAL; INFILTRATION; INTRACAUDAL; PERINEURAL ONCE
Status: DISCONTINUED | OUTPATIENT
Start: 2023-11-14 | End: 2023-11-14

## 2023-11-14 RX ORDER — LIDOCAINE HYDROCHLORIDE 10 MG/ML
10 INJECTION, SOLUTION EPIDURAL; INFILTRATION; INTRACAUDAL; PERINEURAL ONCE
Status: COMPLETED | OUTPATIENT
Start: 2023-11-14 | End: 2023-11-14

## 2023-11-14 RX ORDER — TRIAMCINOLONE ACETONIDE 40 MG/ML
80 INJECTION, SUSPENSION INTRA-ARTICULAR; INTRAMUSCULAR ONCE
Status: COMPLETED | OUTPATIENT
Start: 2023-11-14 | End: 2023-11-14

## 2023-11-14 RX ADMIN — LIDOCAINE HYDROCHLORIDE 10 ML: 10 INJECTION, SOLUTION EPIDURAL; INFILTRATION; INTRACAUDAL; PERINEURAL at 15:02

## 2023-11-14 RX ADMIN — IOPAMIDOL 5 ML: 612 INJECTION, SOLUTION INTRATHECAL at 15:03

## 2023-11-14 RX ADMIN — TRIAMCINOLONE ACETONIDE 80 MG: 40 INJECTION, SUSPENSION INTRA-ARTICULAR; INTRAMUSCULAR at 15:02

## 2023-11-14 ASSESSMENT — ACTIVITIES OF DAILY LIVING (ADL): ADLS_ACUITY_SCORE: 35

## 2023-11-14 NOTE — DISCHARGE INSTRUCTIONS
Cell number on file:    Telephone Information:   Mobile [phone]     Is it ok to leave a message at this number(s)? Yes    Dr Huerta completed your procedure on 11/14/2023.    Current Pain Level (0-10 Scale): 1/10  Post Pain Level (0-10):  0/10    Radiology Discharge instructions for Steroid Injection    Activity Level:     Do not do any heavy activity or exercise for 24 hours.   Do not drive for 4 hours after your injection.  Diet:   Return to your normal diet.  Medications:   If you have stopped taking your Aspirin, Coumadin/Warfarin, Ibuprofen, or any   other blood thinner for this procedure you may resume in the morning unless   your primary care provider has given you other instructions.    Diabetics may see an increase in blood sugar after steroid injections. If you are concerned about your blood sugar, please contact your family doctor.    Site Care:  Remove the bandage and bathe or shower the morning after the procedure.      This is a Pain Management procedure.  You will be contacted in two weeks for follow up.    Call your Primary Care Provider if you have the following (if your primary care provider is not available please seek emergency care):   Nausea with vomiting   Severe headache   Drowsiness or confusion   Redness or drainage at the injection or puncture site   Temperature over 101 degrees F   Other concerns   Worsening back pain   Stiff neck

## 2023-11-28 ENCOUNTER — TELEPHONE (OUTPATIENT)
Dept: INTERVENTIONAL RADIOLOGY/VASCULAR | Facility: HOSPITAL | Age: 78
End: 2023-11-28

## 2023-11-28 NOTE — TELEPHONE ENCOUNTER
CONSULT PATIENT  PAIN INJECTION POST CALL    Procedure: LEFT FACET L3-4, L4-5, L5-S1  Radiologist(s): Dr. Wong Huerta  Date of Procedure: 11/14/23    The patient was not available by telephone.    Left a message for patient to call IR department back      Abbie Ramírez

## 2023-11-30 ENCOUNTER — TELEPHONE (OUTPATIENT)
Dept: INTERVENTIONAL RADIOLOGY/VASCULAR | Facility: HOSPITAL | Age: 78
End: 2023-11-30

## 2023-11-30 NOTE — TELEPHONE ENCOUNTER
CONSULT PATIENT  PAIN INJECTION POST CALL    Procedure: FACET LT L3-4, L4-5, L5-S1  Radiologist(s): Dr. Wong Huerta  Date of Procedure: 11/14/23    The patient had no questions or concerns.    Relief of pain from this injection    A = 90%  A- = 85%  B = 80%  B- =75%  C = 70%  C- = 65%  D = 60%  D- = 50%  F = less than 50%       Did you get any relief from this injection in the past 2 weeks? Yes, patient is currently feeling 70% of relief.     If yes, how long did the relief last? Patient is currently feeling this relief at the two week f/up    Are you still feeling relief? (2 weeks out) Yes  Would you say this injection has been beneficial? Yes      Was there one injection that worked better than the other? This injection helped    Where is the pain? When patient bends the wrong way he feels a pinching in the lower back below the waistline on the left side only  Can you describe the pain? Achy, pinching feeling  Does the pain radiate anywhere? No  If yes, where does it radiate and where does the pain stop? N/A    Is this new pain? No    Patient would not like to pursue another injection at this time.      Abbie Ramírez

## 2023-11-30 NOTE — TELEPHONE ENCOUNTER
CONSULT PATIENT  PAIN INJECTION POST CALL    Procedure: Facet LT L3-4, L4-5, L5-S1  Radiologist(s): Dr. Wong Huerta  Date of Procedure: 11/14/23    The patient was not available by telephone.    2nd attempt. Left a message for patient to call IR department back. Will send a post card.    Abbie Ramírez

## 2024-03-16 ENCOUNTER — HOSPITAL ENCOUNTER (EMERGENCY)
Facility: HOSPITAL | Age: 79
Discharge: HOME | End: 2024-03-16
Attending: PHYSICIAN ASSISTANT | Admitting: PHYSICIAN ASSISTANT
Payer: COMMERCIAL

## 2024-03-16 VITALS
SYSTOLIC BLOOD PRESSURE: 164 MMHG | WEIGHT: 230 LBS | TEMPERATURE: 97.4 F | DIASTOLIC BLOOD PRESSURE: 84 MMHG | OXYGEN SATURATION: 95 % | HEART RATE: 75 BPM | RESPIRATION RATE: 18 BRPM | BODY MASS INDEX: 33 KG/M2

## 2024-03-16 DIAGNOSIS — M54.50 ACUTE RIGHT-SIDED LOW BACK PAIN WITHOUT SCIATICA: ICD-10-CM

## 2024-03-16 PROCEDURE — G0463 HOSPITAL OUTPT CLINIC VISIT: HCPCS

## 2024-03-16 PROCEDURE — 99213 OFFICE O/P EST LOW 20 MIN: CPT | Performed by: PHYSICIAN ASSISTANT

## 2024-03-16 RX ORDER — HYDROCODONE BITARTRATE AND ACETAMINOPHEN 5; 325 MG/1; MG/1
1 TABLET ORAL EVERY 6 HOURS PRN
Qty: 10 TABLET | Refills: 0 | Status: SHIPPED | OUTPATIENT
Start: 2024-03-16 | End: 2024-03-19

## 2024-03-16 ASSESSMENT — COLUMBIA-SUICIDE SEVERITY RATING SCALE - C-SSRS
2. HAVE YOU ACTUALLY HAD ANY THOUGHTS OF KILLING YOURSELF IN THE PAST MONTH?: NO
6. HAVE YOU EVER DONE ANYTHING, STARTED TO DO ANYTHING, OR PREPARED TO DO ANYTHING TO END YOUR LIFE?: NO
1. IN THE PAST MONTH, HAVE YOU WISHED YOU WERE DEAD OR WISHED YOU COULD GO TO SLEEP AND NOT WAKE UP?: NO

## 2024-03-16 ASSESSMENT — ENCOUNTER SYMPTOMS: BACK PAIN: 1

## 2024-03-16 NOTE — ED TRIAGE NOTES
Pt present today with c/o back pain, started today. States he has a long history of back issues. States he brought imaging with him today. Took 3 asprins, 3 tylenol, and 2 hydrocodone today. States his toes feel numb. States he has broken vertebrae in the past.

## 2024-03-16 NOTE — ED PROVIDER NOTES
History     Chief Complaint   Patient presents with    Back Pain     HPI  Dann Espinoza is a 78 year old male who presents to urgent care for evaluation of low back pain.  Patient has a long history of back issues but is currently not taking any medications for his back pain.  He states that last month he did have a epidural injection into his spine which did seem to help.  However, patient states that a few days ago he unloaded a water heater by himself and has had increased significant right lower back pain since.  He denies any urinary retention, incontinence of bowel or bladder, or saddle anesthesia.  Patient states that he took some Tylenol and leftover hydrocodone in the house which helped mildly for his pain.    Allergies:  Allergies   Allergen Reactions    Azithromycin Rash     He developed a rash when on both Azithromycin and Ceftriaxone- unsure which one may have been the culprit      Ceftriaxone Rash     He developed a rash when he was on Ceftriaxone + Azithromycin    Levaquin [Levofloxacin] Rash       Problem List:    Patient Active Problem List    Diagnosis Date Noted    Ametropia 11/08/2022     Priority: Medium    Brow ptosis 11/08/2022     Priority: Medium    Combined forms of age-related cataract of both eyes 11/08/2022     Priority: Medium    Dermatochalasis of both upper eyelids 11/08/2022     Priority: Medium    History of migraine 11/08/2022     Priority: Medium    Subjective visual disturbance 11/08/2022     Priority: Medium     Formatting of this note might be different from the original.  int. shimmering lights, history of migraine      Vertebrobasilar circulation transient ischemic attack 12/13/2018     Priority: Medium    Senile incipient cataract, unspecified laterality 10/05/2017     Priority: Medium    Pneumonia of right lower lobe due to infectious organism 09/21/2017     Priority: Medium    Sepsis, due to unspecified organism 09/21/2017     Priority: Medium    Pneumonia 09/21/2017      Priority: Medium    Aortic root dilation (H) - echo (11/2022) stable 05/23/2016     Priority: Medium    Osteoarthritis of hip 04/01/2016     Priority: Medium    Coxitis 03/30/2016     Priority: Medium    Vitreous hemorrhage (H) 07/09/2015     Priority: Medium    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  Dann Espinoza has no plan or code status on file. Discussed available resources and provided with information. Confirmed code status reflects current choices pending further ACP discussions.  Confirmed/documented legally designated decision makers.  Added by ROSA HAYWARD            Elevated prostate specific antigen (PSA) 06/01/2011     Priority: Medium     Overview:   IMO Update 10/11      Lumbago 01/12/2011     Priority: Medium    Asbestosis (H) 01/12/2011     Priority: Medium     Problem list name updated by automated process. Provider to review      Hyperlipidemia 01/12/2011     Priority: Medium     Overview:   IMO Update 10/11  Problem list name updated by automated process. Provider to review      Constipation 04/16/2004     Priority: Medium     Overview:   Occasional, then gets some bright red blood per rectum   IMO Update      Gout 01/17/2003     Priority: Medium     Overview:   Hx of  Problem list name updated by automated process. Provider to review          Past Medical History:    Past Medical History:   Diagnosis Date    Asbestosis(501) 01/12/2011    Gout,  unspecified 01/17/2003    Lumbago 01/12/2011    Other and unspecified hyperlipidemia 01/12/2011    Psychosexual dysfunction, unspecified 01/12/2011       Past Surgical History:    Past Surgical History:   Procedure Laterality Date    COLONOSCOPY  07-    Rectal Bleeding > Repeat 3 years (2011)    excision of skin cancer      HEMORRHOIDECTOMY      IR CONSULTATION FOR IR EXAM  11/3/2023    ZZC TOTAL HIP ARTHROPLASTY Left 04/15/2019    Robert       Family History:    Family History   Problem Relation Age of Onset    Crohn's Disease Father     Cancer Father 86        Liver - Cause of Death    Heart Disease Father     Heart Disease Brother     Diabetes Brother 49        Cause of Death    Cerebrovascular Disease Mother         CVA    Other - See Comments Mother         AAA    Heart Disease Mother     Heart Disease Sister        Social History:  Marital Status:   [2]  Social History     Tobacco Use    Smoking status: Former    Smokeless tobacco: Never    Tobacco comments:     Quit 50 years ago (2022)   Vaping Use    Vaping Use: Never used   Substance Use Topics    Alcohol use: No    Drug use: No        Medications:    allopurinol (ZYLOPRIM) 300 MG tablet  aspirin (ASA) 81 MG EC tablet  ATORVASTATIN CALCIUM PO  HYDROcodone-acetaminophen (NORCO) 5-325 MG tablet  tamsulosin (FLOMAX) 0.4 MG capsule  INDOMETHACIN PO  methocarbamol (ROBAXIN) 500 MG tablet          Review of Systems   Musculoskeletal:  Positive for back pain.   All other systems reviewed and are negative.      Physical Exam   BP: 164/84  Pulse: 75  Temp: 97.4  F (36.3  C)  Resp: 18  Weight: 104.3 kg (230 lb)  SpO2: 95 %      Physical Exam  Vitals and nursing note reviewed.   Constitutional:       General: He is not in acute distress.     Appearance: Normal appearance. He is not ill-appearing or toxic-appearing.   Musculoskeletal:        Back:    Neurological:      Mental Status: He is alert.         ED Course        Procedures             Critical  Care time:               No results found for this or any previous visit (from the past 24 hour(s)).    Medications - No data to display    Assessments & Plan (with Medical Decision Making)   #1.  Lower back pain    Discussed exam findings with patient.  We discussed possible imaging and/or being admitted for pain control however patient declines at this time.  Patient is discharged with short prescription for hydrocodone for severe pain.  We reviewed appropriate dosing of Tylenol and to not exceed 4000 mg in 24 hours.  He is encouraged to alternate ice and heat.  I would like patient to follow-up with his primary care provider next week.  Any additional concerns in the meantime patient to return to emergency department.  Patient verbalized understanding and agreement to plan.        I have reviewed the nursing notes.    I have reviewed the findings, diagnosis, plan and need for follow up with the patient.            New Prescriptions    HYDROCODONE-ACETAMINOPHEN (NORCO) 5-325 MG TABLET    Take 1 tablet by mouth every 6 hours as needed for severe pain       Final diagnoses:   Acute right-sided low back pain without sciatica       3/16/2024   [facility]       Celio Jensen PA-C  03/16/24 0045

## 2024-06-03 ENCOUNTER — LAB (OUTPATIENT)
Dept: LAB | Facility: OTHER | Age: 79
End: 2024-06-03
Attending: FAMILY MEDICINE
Payer: COMMERCIAL

## 2024-06-03 ENCOUNTER — TELEPHONE (OUTPATIENT)
Dept: CARE COORDINATION | Facility: OTHER | Age: 79
End: 2024-06-03

## 2024-06-03 ENCOUNTER — HOSPITAL ENCOUNTER (OUTPATIENT)
Dept: CT IMAGING | Facility: HOSPITAL | Age: 79
Discharge: HOME | End: 2024-06-03
Attending: FAMILY MEDICINE
Payer: COMMERCIAL

## 2024-06-03 ENCOUNTER — OFFICE VISIT (OUTPATIENT)
Dept: FAMILY MEDICINE | Facility: OTHER | Age: 79
End: 2024-06-03
Attending: FAMILY MEDICINE
Payer: COMMERCIAL

## 2024-06-03 ENCOUNTER — TELEPHONE (OUTPATIENT)
Dept: FAMILY MEDICINE | Facility: OTHER | Age: 79
End: 2024-06-03

## 2024-06-03 ENCOUNTER — ANCILLARY PROCEDURE (OUTPATIENT)
Dept: GENERAL RADIOLOGY | Facility: OTHER | Age: 79
End: 2024-06-03
Attending: FAMILY MEDICINE
Payer: COMMERCIAL

## 2024-06-03 VITALS
RESPIRATION RATE: 18 BRPM | SYSTOLIC BLOOD PRESSURE: 143 MMHG | HEIGHT: 70 IN | OXYGEN SATURATION: 91 % | WEIGHT: 231.2 LBS | DIASTOLIC BLOOD PRESSURE: 84 MMHG | BODY MASS INDEX: 33.1 KG/M2 | TEMPERATURE: 97.8 F | HEART RATE: 76 BPM

## 2024-06-03 DIAGNOSIS — R06.02 SHORTNESS OF BREATH: ICD-10-CM

## 2024-06-03 DIAGNOSIS — R53.1 WEAKNESS: ICD-10-CM

## 2024-06-03 DIAGNOSIS — R55 SYNCOPE, UNSPECIFIED SYNCOPE TYPE: ICD-10-CM

## 2024-06-03 DIAGNOSIS — M54.2 NECK PAIN: ICD-10-CM

## 2024-06-03 DIAGNOSIS — R07.9 CHEST PAIN, UNSPECIFIED TYPE: ICD-10-CM

## 2024-06-03 DIAGNOSIS — R55 SYNCOPE, UNSPECIFIED SYNCOPE TYPE: Primary | ICD-10-CM

## 2024-06-03 PROBLEM — N40.1 BENIGN PROSTATIC HYPERPLASIA WITH URINARY OBSTRUCTION: Status: ACTIVE | Noted: 2024-06-03

## 2024-06-03 PROBLEM — N13.8 BENIGN PROSTATIC HYPERPLASIA WITH URINARY OBSTRUCTION: Status: ACTIVE | Noted: 2024-06-03

## 2024-06-03 PROBLEM — E66.9 OBESITY: Status: ACTIVE | Noted: 2024-06-03

## 2024-06-03 PROBLEM — D48.5 NEOPLASM OF UNCERTAIN BEHAVIOR OF SKIN: Status: ACTIVE | Noted: 2024-06-03

## 2024-06-03 LAB
ALBUMIN SERPL BCG-MCNC: 3.9 G/DL (ref 3.5–5.2)
ALP SERPL-CCNC: 138 U/L (ref 40–150)
ALT SERPL W P-5'-P-CCNC: 21 U/L (ref 0–70)
ANION GAP SERPL CALCULATED.3IONS-SCNC: 7 MMOL/L (ref 7–15)
AST SERPL W P-5'-P-CCNC: 21 U/L (ref 0–45)
ATRIAL RATE - MUSE: 75 BPM
BASOPHILS # BLD AUTO: 0 10E3/UL (ref 0–0.2)
BASOPHILS NFR BLD AUTO: 1 %
BILIRUB SERPL-MCNC: 0.5 MG/DL
BUN SERPL-MCNC: 17.2 MG/DL (ref 8–23)
CALCIUM SERPL-MCNC: 10 MG/DL (ref 8.8–10.2)
CHLORIDE SERPL-SCNC: 102 MMOL/L (ref 98–107)
CREAT SERPL-MCNC: 1.14 MG/DL (ref 0.67–1.17)
CRP SERPL-MCNC: <3 MG/L
DEPRECATED HCO3 PLAS-SCNC: 28 MMOL/L (ref 22–29)
DIASTOLIC BLOOD PRESSURE - MUSE: NORMAL MMHG
EGFRCR SERPLBLD CKD-EPI 2021: 66 ML/MIN/1.73M2
EOSINOPHIL # BLD AUTO: 0.2 10E3/UL (ref 0–0.7)
EOSINOPHIL NFR BLD AUTO: 4 %
ERYTHROCYTE [DISTWIDTH] IN BLOOD BY AUTOMATED COUNT: 14.4 % (ref 10–15)
GLUCOSE SERPL-MCNC: 105 MG/DL (ref 70–99)
HCT VFR BLD AUTO: 43.5 % (ref 40–53)
HGB BLD-MCNC: 14.9 G/DL (ref 13.3–17.7)
IMM GRANULOCYTES # BLD: 0 10E3/UL
IMM GRANULOCYTES NFR BLD: 0 %
INTERPRETATION ECG - MUSE: NORMAL
LYMPHOCYTES # BLD AUTO: 1.2 10E3/UL (ref 0.8–5.3)
LYMPHOCYTES NFR BLD AUTO: 21 %
MAGNESIUM SERPL-MCNC: 2.2 MG/DL (ref 1.7–2.3)
MCH RBC QN AUTO: 30.1 PG (ref 26.5–33)
MCHC RBC AUTO-ENTMCNC: 34.3 G/DL (ref 31.5–36.5)
MCV RBC AUTO: 88 FL (ref 78–100)
MONOCYTES # BLD AUTO: 0.6 10E3/UL (ref 0–1.3)
MONOCYTES NFR BLD AUTO: 11 %
NEUTROPHILS # BLD AUTO: 3.6 10E3/UL (ref 1.6–8.3)
NEUTROPHILS NFR BLD AUTO: 63 %
NRBC # BLD AUTO: 0 10E3/UL
NRBC BLD AUTO-RTO: 0 /100
NT-PROBNP SERPL-MCNC: 179 PG/ML (ref 0–1800)
P AXIS - MUSE: 56 DEGREES
PLATELET # BLD AUTO: 219 10E3/UL (ref 150–450)
POTASSIUM SERPL-SCNC: 4.4 MMOL/L (ref 3.4–5.3)
PR INTERVAL - MUSE: 166 MS
PROT SERPL-MCNC: 6.9 G/DL (ref 6.4–8.3)
QRS DURATION - MUSE: 98 MS
QT - MUSE: 384 MS
QTC - MUSE: 428 MS
R AXIS - MUSE: -15 DEGREES
RBC # BLD AUTO: 4.95 10E6/UL (ref 4.4–5.9)
SODIUM SERPL-SCNC: 137 MMOL/L (ref 135–145)
SYSTOLIC BLOOD PRESSURE - MUSE: NORMAL MMHG
T AXIS - MUSE: 50 DEGREES
TROPONIN T SERPL HS-MCNC: 13 NG/L
TROPONIN T SERPL HS-MCNC: 14 NG/L
TSH SERPL DL<=0.005 MIU/L-ACNC: 2.88 UIU/ML (ref 0.3–4.2)
VENTRICULAR RATE- MUSE: 75 BPM
WBC # BLD AUTO: 5.6 10E3/UL (ref 4–11)

## 2024-06-03 PROCEDURE — 84443 ASSAY THYROID STIM HORMONE: CPT | Mod: ZL | Performed by: FAMILY MEDICINE

## 2024-06-03 PROCEDURE — 83735 ASSAY OF MAGNESIUM: CPT | Mod: ZL | Performed by: FAMILY MEDICINE

## 2024-06-03 PROCEDURE — 84484 ASSAY OF TROPONIN QUANT: CPT | Mod: ZL

## 2024-06-03 PROCEDURE — 86140 C-REACTIVE PROTEIN: CPT | Mod: ZL | Performed by: FAMILY MEDICINE

## 2024-06-03 PROCEDURE — G0463 HOSPITAL OUTPT CLINIC VISIT: HCPCS | Mod: 25

## 2024-06-03 PROCEDURE — 99215 OFFICE O/P EST HI 40 MIN: CPT | Performed by: FAMILY MEDICINE

## 2024-06-03 PROCEDURE — 83880 ASSAY OF NATRIURETIC PEPTIDE: CPT | Mod: ZL | Performed by: FAMILY MEDICINE

## 2024-06-03 PROCEDURE — 93010 ELECTROCARDIOGRAM REPORT: CPT | Performed by: INTERNAL MEDICINE

## 2024-06-03 PROCEDURE — 70450 CT HEAD/BRAIN W/O DYE: CPT

## 2024-06-03 PROCEDURE — 82247 BILIRUBIN TOTAL: CPT | Mod: ZL | Performed by: FAMILY MEDICINE

## 2024-06-03 PROCEDURE — 93005 ELECTROCARDIOGRAM TRACING: CPT | Performed by: FAMILY MEDICINE

## 2024-06-03 PROCEDURE — 36415 COLL VENOUS BLD VENIPUNCTURE: CPT | Mod: ZL | Performed by: FAMILY MEDICINE

## 2024-06-03 PROCEDURE — 84450 TRANSFERASE (AST) (SGOT): CPT | Mod: ZL | Performed by: FAMILY MEDICINE

## 2024-06-03 PROCEDURE — 85025 COMPLETE CBC W/AUTO DIFF WBC: CPT | Mod: ZL | Performed by: FAMILY MEDICINE

## 2024-06-03 PROCEDURE — 36415 COLL VENOUS BLD VENIPUNCTURE: CPT | Mod: ZL

## 2024-06-03 PROCEDURE — 72040 X-RAY EXAM NECK SPINE 2-3 VW: CPT | Mod: TC

## 2024-06-03 PROCEDURE — 84484 ASSAY OF TROPONIN QUANT: CPT | Mod: ZL | Performed by: FAMILY MEDICINE

## 2024-06-03 ASSESSMENT — PAIN SCALES - GENERAL: PAINLEVEL: NO PAIN (0)

## 2024-06-03 ASSESSMENT — ENCOUNTER SYMPTOMS
ABDOMINAL PAIN: 0
NUMBNESS: 1
HEADACHES: 1
RHINORRHEA: 0
FEVER: 0
CHILLS: 0
LIGHT-HEADEDNESS: 1
PALPITATIONS: 0
DIZZINESS: 1
SHORTNESS OF BREATH: 1
DYSPHORIC MOOD: 1

## 2024-06-03 NOTE — TELEPHONE ENCOUNTER
Symptom or reason needing to speak to RN: Passed out     Best number to return call: [phone]     Best time to return call: Anytime

## 2024-06-03 NOTE — TELEPHONE ENCOUNTER
Patient called to report that when he was driving to [employer] yesterday he felt dizzy so he pulled over to the side of the road and was almost to a complete stop when he passed out. He said he woke up and his front tires were in the grass 20 feet or so from where he was before he passed out. RN spoke with provider and patient added to schedule. RN asked if he had a  and he said his sister will drive him.

## 2024-06-03 NOTE — PATIENT INSTRUCTIONS
We put in orders for echocardiogram, heart monitor  We also put in orders for ultrasound of your carotid arteries     We placed an order for MRI of your neck

## 2024-06-03 NOTE — PROGRESS NOTES
Assessment & Plan     Syncope, unspecified syncope type  Ddx includes neck etiology (dizziness with turning head, possible syncope d/t looking over his right shoulder to pull off the road) cardiac (recent episode of chest pain after walking up and down basement stairs, shortness of breath in the morning, ?hypotension), neurologic (chronic issues with dizziness, unlikely sz). TSH wnl. No new medications/ supplements  Echo (11/22/2022): EF 55 to 60%, Ascending aorta 3.91cm   Troponin negative x2  - CBC with Platelets & Differential; Future  - Comprehensive metabolic panel (BMP + Alb, Alk Phos, ALT, AST, Total. Bili, TP); Future  - CRP, inflammation; Future  - Magnesium; Future  - TSH with free T4 reflex; Future  - CT Head w/o Contrast; Future  - TSH with free T4 reflex  - Magnesium  - CRP, inflammation  - Comprehensive metabolic panel (BMP + Alb, Alk Phos, ALT, AST, Total. Bili, TP)  - CBC with Platelets & Differential  - EKG 12-lead complete w/read - (Clinic Performed)  - echo, ziopatch  - US carotid      Weakness  - TSH with free T4 reflex; Future  - TSH with free T4 reflex    Shortness of breath  BNP wnl  - BNP-N terminal pro    Chest pain, unspecified type  - Troponin T, High Sensitivity    Neck pain  Most likely etiology   Xray with multilevel degenerative changes   - XR CERVICAL SPINE 2/3 VWS (Clinic Performed); Future  - MRI cervical     53 minutes spent on the date of the encounter doing chart review, review of test results, interpretation of tests, patient visit, documentation       See Patient Instructions    Next visit 7/30/2024    Corky Quigley is a 78 year old, presenting for the following health issues:  Syncope    HPI     Dizziness / syncope  Onset/Duration: 06/02/2024  Description:   Do you feel faint: YES  Does it feel like the surroundings (bed, room) are moving: YES  Unsteady/off balance: YES  Have you passed out or fallen: YES  Intensity: severe  Progression of Symptoms: feeling dizzy at  "this time  Accompanying Signs & Symptoms:  Heart palpitations or chest pain: YES- pain under the right side of mouth  Nausea, vomiting: YES- upon rising at times  Weakness or lack of coordination in arms or legs: No  Vision or speech changes: YES- migraines for a few years  Numbness or tingling: No  Ringing in ears (Tinnitus): YES  Hearing Loss: No  History:   Head trauma/concussion history: No  Previous similar symptoms: No  Recent bleeding history: No  Any new medications (BP?): No  Precipitating factors:   Worse with activity: No  Worse with head movement: YES  Alleviating factors:   Does staying in a fixed position give relief: no   Therapies tried and outcome: None    - was going to [employer] and started to get dizzy after turning his head to look at the [place].. Pull off the side of the road. Put his foot on the brake and went into the grass. He turned his head over his right shoulder to check traffic   - first episode of syncope   - headaches are chronic issues. Had a headache after the episode. Takes ASA   - Dann knew where he was when he came to  - Dann ate (pancake) before he went to [employer]   - fell a couple day previous to episode, landed 6' back and right hip. Did not hit his head.   - shortness of breath in the morning  - chest pain - left side, right side neck. Resolves w/o intervention. Chronic issue. Last episode 9AM today. Was down in the basement a few times. BP reading was 211/76  - no new medication / supplements  - was well hydrated before episode   - dizziness with turning neck   - home blood pressure 139/78, 84/45, 211/76, generally 130s  - history of seeing bright white lights, which his eye doctor said were \"mini strokes\"       Review of Systems   Constitutional:  Negative for chills and fever.   HENT:  Negative for congestion and rhinorrhea.    Respiratory:  Positive for shortness of breath (chronic).    Cardiovascular:  Positive for chest pain. Negative for palpitations. " "  Gastrointestinal:  Negative for abdominal pain.   Neurological:  Positive for dizziness, syncope, light-headedness, numbness (feet at the end of the day) and headaches (yesterday after the episode).   Psychiatric/Behavioral:  Positive for dysphoric mood.            Objective    BP (!) 143/84   Pulse 76   Temp 97.8  F (36.6  C) (Tympanic)   Resp 18   Ht 1.778 m (5' 10\")   Wt 104.9 kg (231 lb 3.2 oz)   SpO2 91%   BMI 33.17 kg/m    Body mass index is 33.17 kg/m .  Physical Exam  Constitutional:       General: He is not in acute distress.     Appearance: He is not ill-appearing.   HENT:      Right Ear: There is impacted cerumen.      Left Ear: Tympanic membrane normal.   Cardiovascular:      Rate and Rhythm: Normal rate and regular rhythm.      Pulses: Normal pulses.      Heart sounds: No murmur heard.  Pulmonary:      Effort: Pulmonary effort is normal. No respiratory distress.      Breath sounds: No wheezing or rales.   Abdominal:      General: Bowel sounds are normal.      Tenderness: There is no abdominal tenderness.   Musculoskeletal:      Right lower leg: No edema.      Left lower leg: No edema.   Neurological:      Mental Status: He is alert.          Results for orders placed or performed in visit on 06/03/24   Troponin T, High Sensitivity     Status: Normal   Result Value Ref Range    Troponin T, High Sensitivity 13 <=22 ng/L   Results for orders placed or performed in visit on 06/03/24   XR CERVICAL SPINE 2/3 VWS (Clinic Performed)     Status: None    Narrative    Exam: XR CERVICAL SPINE 2/3 VIEWS     Exam reason: Neck pain    Comparison: None.    Findings:    Cervical spine alignment is normal.    No acute fracture or subluxation.     There are multilevel degenerative changes of the cervical spine  including multilevel disc space narrowing and facet arthropathy, worst  at C3-C4.     Prevertebral soft tissues are normal in caliber and contour.      Impression    Impression:   No acute fracture or " subluxation.    Multilevel degenerative changes of the cervical spine.    MEGHANA WALKER MD         SYSTEM ID:  [id number]   Results for orders placed or performed in visit on 06/03/24   TSH with free T4 reflex     Status: Normal   Result Value Ref Range    TSH 2.88 0.30 - 4.20 uIU/mL   Magnesium     Status: Normal   Result Value Ref Range    Magnesium 2.2 1.7 - 2.3 mg/dL   CRP, inflammation     Status: Normal   Result Value Ref Range    CRP Inflammation <3.00 <5.00 mg/L   Comprehensive metabolic panel (BMP + Alb, Alk Phos, ALT, AST, Total. Bili, TP)     Status: Abnormal   Result Value Ref Range    Sodium 137 135 - 145 mmol/L    Potassium 4.4 3.4 - 5.3 mmol/L    Carbon Dioxide (CO2) 28 22 - 29 mmol/L    Anion Gap 7 7 - 15 mmol/L    Urea Nitrogen 17.2 8.0 - 23.0 mg/dL    Creatinine 1.14 0.67 - 1.17 mg/dL    GFR Estimate 66 >60 mL/min/1.73m2    Calcium 10.0 8.8 - 10.2 mg/dL    Chloride 102 98 - 107 mmol/L    Glucose 105 (H) 70 - 99 mg/dL    Alkaline Phosphatase 138 40 - 150 U/L    AST 21 0 - 45 U/L    ALT 21 0 - 70 U/L    Protein Total 6.9 6.4 - 8.3 g/dL    Albumin 3.9 3.5 - 5.2 g/dL    Bilirubin Total 0.5 <=1.2 mg/dL   CBC with platelets and differential     Status: None   Result Value Ref Range    WBC Count 5.6 4.0 - 11.0 10e3/uL    RBC Count 4.95 4.40 - 5.90 10e6/uL    Hemoglobin 14.9 13.3 - 17.7 g/dL    Hematocrit 43.5 40.0 - 53.0 %    MCV 88 78 - 100 fL    MCH 30.1 26.5 - 33.0 pg    MCHC 34.3 31.5 - 36.5 g/dL    RDW 14.4 10.0 - 15.0 %    Platelet Count 219 150 - 450 10e3/uL    % Neutrophils 63 %    % Lymphocytes 21 %    % Monocytes 11 %    % Eosinophils 4 %    % Basophils 1 %    % Immature Granulocytes 0 %    NRBCs per 100 WBC 0 <1 /100    Absolute Neutrophils 3.6 1.6 - 8.3 10e3/uL    Absolute Lymphocytes 1.2 0.8 - 5.3 10e3/uL    Absolute Monocytes 0.6 0.0 - 1.3 10e3/uL    Absolute Eosinophils 0.2 0.0 - 0.7 10e3/uL    Absolute Basophils 0.0 0.0 - 0.2 10e3/uL    Absolute Immature Granulocytes 0.0 <=0.4 10e3/uL     Absolute NRBCs 0.0 10e3/uL   BNP-N terminal pro     Status: Normal   Result Value Ref Range    N Terminal Pro BNP Outpatient 179 0 - 1,800 pg/mL   Troponin T, High Sensitivity     Status: Normal   Result Value Ref Range    Troponin T, High Sensitivity 14 <=22 ng/L   EKG 12-lead complete w/read - (Clinic Performed)     Status: None   Result Value Ref Range    Systolic Blood Pressure  mmHg    Diastolic Blood Pressure  mmHg    Ventricular Rate 75 BPM    Atrial Rate 75 BPM    MA Interval 166 ms    QRS Duration 98 ms     ms    QTc 428 ms    P Axis 56 degrees    R AXIS -15 degrees    T Axis 50 degrees    Interpretation ECG       Sinus rhythm  Normal ECG  No previous ECGs available  Confirmed by MD Richmond Anthony ([id number]) on 6/3/2024 1:03:24 PM     CBC with Platelets & Differential     Status: None    Narrative    The following orders were created for panel order CBC with Platelets & Differential.  Procedure                               Abnormality         Status                     ---------                               -----------         ------                     CBC with platelets and d...[[id number]]                      Final result                 Please view results for these tests on the individual orders.   Results for orders placed or performed during the hospital encounter of 06/03/24   CT Head w/o Contrast     Status: None    Narrative    PROCEDURE: CT HEAD W/O CONTRAST 6/3/2024 1:15 PM    HISTORY: syncope. concern for CVA; Syncope, unspecified syncope type    COMPARISONS: 1/27/2019.    TECHNIQUE: Axial noncontrast enhanced images with coronal and sagittal  reformatted images.    FINDINGS: Ventricles are fairly normal in size for patient of this  age. There is some atrophy in the cerebellum, similar to the prior  exam. No mass or midline shift is seen and there is no extra-axial  fluid collection or acute hemorrhage. Gray-white differentiation is  preserved.    Bone windows show no fracture.  Visualized paranasal sinuses and  mastoid air cells are relatively clear.         Impression    IMPRESSION: No acute mass effect or hemorrhage.    LAZ MADISON MD         SYSTEM ID:  [id number]             EKG (today): sinus rhythm, normal intervals, axis normal, no ST/T changes consistent with ischemia, no LVH by voltage. EKG same as previous (4/11/2019)    Signed Electronically by: Nelly Mirza MD

## 2024-06-04 ENCOUNTER — TELEPHONE (OUTPATIENT)
Dept: FAMILY MEDICINE | Facility: OTHER | Age: 79
End: 2024-06-04

## 2024-06-04 NOTE — TELEPHONE ENCOUNTER
Results requested: labs     Best number to reach patient at: [phone]     Best time to call patient: Anytime - pt states that he was not home when initial call was made.     Send to care team in basket.

## 2024-06-14 ENCOUNTER — HOSPITAL ENCOUNTER (OUTPATIENT)
Dept: ULTRASOUND IMAGING | Facility: HOSPITAL | Age: 79
Discharge: HOME | End: 2024-06-14
Attending: FAMILY MEDICINE
Payer: COMMERCIAL

## 2024-06-14 ENCOUNTER — ALLIED HEALTH/NURSE VISIT (OUTPATIENT)
Dept: FAMILY MEDICINE | Facility: OTHER | Age: 79
End: 2024-06-14
Attending: FAMILY MEDICINE
Payer: COMMERCIAL

## 2024-06-14 ENCOUNTER — HOSPITAL ENCOUNTER (OUTPATIENT)
Dept: CARDIOLOGY | Facility: HOSPITAL | Age: 79
Discharge: HOME | End: 2024-06-14
Attending: FAMILY MEDICINE
Payer: COMMERCIAL

## 2024-06-14 ENCOUNTER — HOSPITAL ENCOUNTER (OUTPATIENT)
Dept: MRI IMAGING | Facility: HOSPITAL | Age: 79
Discharge: HOME | End: 2024-06-14
Attending: FAMILY MEDICINE
Payer: COMMERCIAL

## 2024-06-14 DIAGNOSIS — R55 SYNCOPE, UNSPECIFIED SYNCOPE TYPE: ICD-10-CM

## 2024-06-14 DIAGNOSIS — M54.2 NECK PAIN: ICD-10-CM

## 2024-06-14 LAB — LVEF ECHO: NORMAL

## 2024-06-14 PROCEDURE — 93880 EXTRACRANIAL BILAT STUDY: CPT

## 2024-06-14 PROCEDURE — 93246 EXT ECG>7D<15D RECORDING: CPT

## 2024-06-14 PROCEDURE — 93306 TTE W/DOPPLER COMPLETE: CPT

## 2024-06-14 PROCEDURE — 72141 MRI NECK SPINE W/O DYE: CPT

## 2024-06-14 PROCEDURE — 93306 TTE W/DOPPLER COMPLETE: CPT | Mod: 26 | Performed by: INTERNAL MEDICINE

## 2024-06-14 NOTE — PROGRESS NOTES
Dann Espinoza arrived here on 6/14/2024 12:38 PM for 8-14 Days  Zio monitor placement per ordering provider Yuki for the diagnosis Syncope.  Patient s skin was prepped per protocol.  Zio monitor was placed.  Instructions were reviewed with and given to the patient.  Patient verbalized understanding of wear, troubleshooting and monitor return instructions.

## 2024-06-17 DIAGNOSIS — M54.2 NECK PAIN: Primary | ICD-10-CM

## 2024-07-02 DIAGNOSIS — I45.5 SINUS PAUSE: Primary | ICD-10-CM

## 2024-07-02 PROCEDURE — 93248 EXT ECG>7D<15D REV&INTERPJ: CPT | Performed by: INTERNAL MEDICINE

## 2024-07-03 ENCOUNTER — TELEPHONE (OUTPATIENT)
Dept: CARDIOLOGY | Facility: OTHER | Age: 79
End: 2024-07-03

## 2024-07-03 ENCOUNTER — TELEPHONE (OUTPATIENT)
Dept: FAMILY MEDICINE | Facility: OTHER | Age: 79
End: 2024-07-03

## 2024-07-03 DIAGNOSIS — I45.5 SINUS PAUSE: Primary | ICD-10-CM

## 2024-07-09 ENCOUNTER — TELEPHONE (OUTPATIENT)
Dept: CARDIOLOGY | Facility: CLINIC | Age: 79
End: 2024-07-09

## 2024-07-09 NOTE — TELEPHONE ENCOUNTER
----- Message from Isai George sent at 7/9/2024 12:14 PM CDT -----  Daren Tsang and all    Thanks for your prompt response.    That would not be safe for the patient to delay his care.  If he is out of state, he needs to seek urgent medical attention within the next 24-48 hours.      His monitor is indicating he is having pauses that can lead to syncope, in which he will be at risk of serious injury/deadly complication.    He NEEDS to seek medical attention within the next 24-48 hours.      Others with cardiology seeing this message, please help facilitate the patient to obtain care urgently.    Please feel free to call me at [phone]  ----- Message -----  From: Trinh Esposito LPN  Sent: 7/9/2024  12:04 PM CDT  To: Isai George MD    Patient is currently out of state and 17th is soonest he was able to come in when he gets back. Patient will call if he is back early.   Trinh Esposito LPN  ----- Message -----  From: Isai George MD  Sent: 7/9/2024  11:40 AM CDT  To: Xuan Webster RN;  Cardiology    Hi All,    I was just reviewing the chart on this patient for their visit next Wednesday in clinic for complaints of syncope.    However, their Ziopatch monitor shows significant pauses of up to 7.5 seconds that correspond to episodes of dizziness.    This gentleman needs an urgent referral to electrophysiology (can be virtual) within the next day or so.  He should NOT wait to see me until next week.  If he has other episodes, he needs to report to the ED for more urgent treatment and consultation with EP    Will leave phone message with [employer] line as well.    Please feel free to call me with questions:  [phone]    Thanks!

## 2024-07-09 NOTE — TELEPHONE ENCOUNTER
Received call from zio patch on 7/3/24 that brionna had and 7.5 second pause when wearing zio patch 6/21/24. Relayed message to Dr Braga Verbally. Per Dr Braga call placed to patient to see if he would come in earlier then the 17th when he is scheduled with Dr George. Call placed to patient and he is out of state until the 16th. Advised patient that if he comes back early at all to call [facility] and get set up with appointment. Dann verbalized understanding. Dr Braga verbally notified of situation.  Trinh Esposito LPN

## 2024-07-09 NOTE — TELEPHONE ENCOUNTER
Telephone call to patient.  No answer.  Left message, with phone number,  requesting return call to cardiology Asap.      Gisela Webster RN  Cardiology/CHF Care Coordinator

## 2024-07-09 NOTE — TELEPHONE ENCOUNTER
Call placed to patient with no answer. Left message to call cariology back as soon as he is able.   Trinh Esposito LPN

## 2024-07-10 NOTE — TELEPHONE ENCOUNTER
Telephone call placed to daughter Jeanette who is listed as a contact for the patient.  Message left requesting a return call.  Number given.    Gisela Webster RN  Cardiology/CHF Care Coordinator  July 10, 2024...11:57 AM

## 2024-07-10 NOTE — TELEPHONE ENCOUNTER
"Patient returned call to cardiology.  The message from Dr. George was relayed to the patient and he was advised to go to the closest ER for evaluation as soon as possible.  This writer emphasized the emergence of this situation, and thoroughly explained how dangerous this could be if left untreated.  He was also told of the increased risk of death/injury to self and/or others.  The patient stated that his daughter is driving for him, so he's not driving and that he has not had any dizzy spells over the past couple days.  At this time he is refusing to follow the medical advice from the cardiology MD and nurse.  He stated  his medical care is not a covered expense where he is in [place], and it would be too costly.  This writer again emphasized that this condition is life threatening, and that he needs to be seen at the nearest ER.     He said unless he \"starts feeling  not well,\" that he is going to wait until he gets home on or around the 15th.     Patient did verbalize understanding  of the doctors recommendation and of the serious nature of this condition and that it could lead to his and/or others deaths.     "

## 2024-07-10 NOTE — TELEPHONE ENCOUNTER
2nd attempt:  Placed call to patient.  No answer.  Message left stating that it's imperative that he return a call to cardiology ASAP.  Number for cardiology given.    Gisela Webster RN  Cardiology/CHF Care Coordinator  July 10, 2024...11:50 AM

## 2024-07-12 NOTE — TELEPHONE ENCOUNTER
Daughter returns call. Advised we had gotten a hold of her dad and this was an old message. Spoke with patient and daughter at the time of the call on speaker. Patient reports no new concerns or symptoms.

## 2024-07-15 ENCOUNTER — TELEPHONE (OUTPATIENT)
Dept: CARDIOLOGY | Facility: CLINIC | Age: 79
End: 2024-07-15

## 2024-07-15 NOTE — TELEPHONE ENCOUNTER
Patient called to let us know that he is back from out of state. Messages sent to EP to see if they are able to see patient via televisit ASAP. Awaiting to hear back.   Trinh Esposito LPN

## 2024-07-15 NOTE — TELEPHONE ENCOUNTER
Tomorrow's visit was corrected and clarified by Maria Eugenia LEROY, and Ivelisse LEROY, patient will be able to complete hisEP appt through a telephone visit from his home, with Marlin CASTELLANO tomorrow at 9 a.m.    Patient verbalized understanding and agreement with the plan to have a telephone visit tomorrow morning  with Marlin Galvez PA-C, and verbalized understanding that he does not have to travel for his appt, it take place from his home.

## 2024-07-15 NOTE — TELEPHONE ENCOUNTER
"Spoke with patient.  He is willing to \"try\" to make the 9:00  appt with an 8:45 arrival. He did state that he is coming from [facility] [place]note 2[place] which would be over 70 miles for him to drive  to [facility].  But he stated that he knows that he should see someone, so he willl definitely try to make it there tomorrow.   "

## 2024-07-15 NOTE — TELEPHONE ENCOUNTER
Ivelisse Finn, RN sent to Xuan Webster RN Kailey can see him virtually at 9am tomorrow, can you offer him that? If he is agreeable I can schedule it          Previous Messages       ----- Message -----  From: Maria Eugenia Muniz RN  Sent: 7/15/2024   2:07 PM CDT  To: Ivelisse Finn, RN; *  Subject: RE: EP visit                                    We filled it with another patient already and sent a staff message to Ivelisse in regards to this.  Please let us know how to proceed  ----- Message -----  From: Susan Bradford, JOSHUA CNP  Sent: 7/15/2024   1:20 PM CDT  To: Xuan Webster RN; Ivelisse Finn, RN; *  Subject: RE: EP visit                                    Hello,  Our telemed with GICH cx for tomorrow 7/16. Maria Eugenia, would we be OK to use that spot for this pt?  Thanks!  LVW

## 2024-07-16 ENCOUNTER — VIRTUAL VISIT (OUTPATIENT)
Dept: CARDIOLOGY | Facility: CLINIC | Age: 79
End: 2024-07-16
Payer: COMMERCIAL

## 2024-07-16 ENCOUNTER — TELEPHONE (OUTPATIENT)
Dept: CARDIOLOGY | Facility: CLINIC | Age: 79
End: 2024-07-16

## 2024-07-16 VITALS — HEIGHT: 71 IN | BODY MASS INDEX: 31.5 KG/M2 | WEIGHT: 225 LBS

## 2024-07-16 DIAGNOSIS — R55 SYNCOPE, UNSPECIFIED SYNCOPE TYPE: ICD-10-CM

## 2024-07-16 DIAGNOSIS — I45.5 SINUS PAUSE: Primary | ICD-10-CM

## 2024-07-16 PROCEDURE — 99443 PR PHYSICIAN TELEPHONE EVALUATION 21-30 MIN: CPT | Mod: 93

## 2024-07-16 ASSESSMENT — PAIN SCALES - GENERAL: PAINLEVEL: NO PAIN (0)

## 2024-07-16 NOTE — TELEPHONE ENCOUNTER
Marlin Galvez PA called [facility] [place], gave report to ED physician about patient coming.   Ivelisse Finn RN on 7/16/2024 at 10:12 AM

## 2024-07-16 NOTE — LETTER
7/16/2024      RE: Dann Espinoza  [address]  [facility] [address]       Dear Colleague,    Thank you for the opportunity to participate in the care of your patient, Dann Espinoza, at the [facility] HEART [facility]. Please see a copy of my visit note below.    Telephone Visit Details    Type of service: Telephone Visit   Phone call duration: 35 minutes   Originating Location (pt. Location): Home  Distant Location (provider location):  On-site        ELECTROPHYSIOLOGY TELEPHONE VISIT    Assessment/Recommendations   Assessment/Plan:    Dann Espinoza is a 78 year old male with past medical history significant for gout and hyperlipidemia.     Syncope   Lightheadedness, Dizziness   Symptomatic Sinus Pauses, up to 7.5 seconds   Patient with new symptoms of syncope, dizziness and lightheadedness beginning about one month ago. One episode occurring while driving about a month ago, he became dizzy, looked over to see if anyone was in the daryl, then pulled over onto the shoulder, his vision went black, he thinks he lost consciousness for several seconds. Broad work up done including labs, echo, zio patch and carotid US. Echo done with LVEF 55-60%. Zio done 6/14-6/24/24 with 283 pauses, longest lasting 7.5 seconds. Occurring during daytime and night time hours, symptoms associated to pauses. He has since continues to have occasional dizziness/lightheadedness that last for several seconds and then resolves. No further episodes of syncope.   I dicussed treatment for this is PPM implant. We discussed risks, benefits and technical aspects of the procedure. I discussed I can work to arrange this procedure urgently at [facility], he reports his family mentioned [facility] can do PPM and he would like to go there instead. I advised him I would recommend, in that scenario, that he present to the ER for expedited workup. Discussed he should not drive and should have  someone else take him.        History of Present Illness/Subjective    Mr. Dann Espinoza is a 78 year old male who comes in today for EP consultation of dizziness, syncope, pauses on zio.    Patient is a 77 yo male with history of gout and hyperlipidemia. He was recently seen by his PCP on 6/3/24 with report of new syncope. One episode occurring while driving about a month ago, he became dizzy, looked over to see if anyone was in the daryl, then pulled over onto the shoulder, his vision went black, he thinks he lost consciousness for several seconds.   Broad work up done including labs, echo, zio patch and carotid US. Echo done with LVEF 55-60%. Bilateral carotid US with mild atherosclerotic plaque at bifurcations. Zio done 6/14-6/24/24 with 283 pauses, longest lasting 7.5 seconds. Occurring during daytime and night time hours, symptoms associated to pauses. Urgent cardiology referral placed, however, patient reports he was out of town until today. He was advised to go to the ER several times for findings but did not do so.     He presents today for follow up. He reports several other episodes of feeling dizzy and lightheaded for several seconds before this resolves over the past couple of weeks. He has not had any further episodes of syncope. Denies any other new symptoms.     I have reviewed and updated the patient's Past Medical History, Social History, Family History and Medication List.     Cardiographics (Personally Reviewed) :   Echo: 6/14/2024   Interpretation Summary  No pericardial effusion is present.  Global and regional left ventricular function is normal with an EF of 55-60%.  Left ventricular diastolic function is normal.  Global right ventricular function is normal.  Trace mitral insufficiency is present.  Trace aortic insufficiency is present.  No aortic stenosis is present.    Ziopatch:   6/14-6/24/24: sinus  bpm, avg 68 bpm. 283 pauses longest lasting 7.5 sec.      Bilateral Carotid US  "6/14/2024   FINDINGS: There is mild atherosclerotic plaquing at both carotid  bifurcations. There are no hemodynamically significant stenoses.  Forward flow was demonstrated in the vertebral arteries.                                                                   IMPRESSION: No hemodynamically significant stenoses are identified at  the carotid bifurcations.       Physical Examination   Ht 1.791 m (5' 10.5\")   Wt 102.1 kg (225 lb)   BMI 31.83 kg/m    Wt Readings from Last 3 Encounters:   07/16/24 102.1 kg (225 lb)   06/03/24 104.9 kg (231 lb 3.2 oz)   03/16/24 104.3 kg (230 lb)     Visit conducted via telephone.          Medications  Allergies   Current Outpatient Medications   Medication Sig Dispense Refill     allopurinol (ZYLOPRIM) 300 MG tablet Take 1 tablet by mouth once daily 90 tablet 3     aspirin (ASA) 81 MG EC tablet Take 1 tablet by mouth daily.       ATORVASTATIN CALCIUM PO Take 40 mg by mouth daily       INDOMETHACIN PO        tamsulosin (FLOMAX) 0.4 MG capsule Take 0.4 mg by mouth 2 times daily       No current facility-administered medications for this visit.      Allergies   Allergen Reactions     Azithromycin Rash     He developed a rash when on both Azithromycin and Ceftriaxone- unsure which one may have been the culprit       Ceftriaxone Rash     He developed a rash when he was on Ceftriaxone + Azithromycin     Levaquin [Levofloxacin] Rash         Lab Results (Personally Reviewed)    Chemistry/lipid CBC Cardiac Enzymes/BNP/TSH/INR   Lab Results   Component Value Date    BUN 17.2 06/03/2024     06/03/2024    CO2 28 06/03/2024     Creatinine   Date Value Ref Range Status   06/03/2024 1.14 0.67 - 1.17 mg/dL Final   04/11/2019 0.96 0.66 - 1.25 mg/dL Final       Lab Results   Component Value Date    CHOL 164 10/06/2016    HDL 49 10/06/2016    LDL 86 10/06/2016      Lab Results   Component Value Date    WBC 5.6 06/03/2024    HGB 14.9 06/03/2024    HCT 43.5 06/03/2024    MCV 88 06/03/2024    "  06/03/2024    Lab Results   Component Value Date    TSH 2.88 06/03/2024    INR 0.9 06/02/2017        I have reviewed the note as documented above.  This accurately captures the substance of my virtual visit with the patient. The patient states understanding and is agreeable with the plan.     Marlin Galvez PA-C  [facility]  Electrophysiology Consult Service  Pager: [phone]    Total time spent on patient visit, reviewing notes, imaging, labs, orders, and completing necessary documentation: 35 minutes.                        Please do not hesitate to contact me if you have any questions/concerns.     Sincerely,     Marlin Galvez PA-C

## 2024-07-16 NOTE — TELEPHONE ENCOUNTER
[facility] Call Center    Phone Message    May a detailed message be left on voicemail: yes     Reason for Call: Other: PT c'd to say he is on his way to [place] to the hospital to have the procedure they he discussed this morning w/K Gekemi.     Action Taken: Message routed to:  [facility] ([facility] cardio    Travel Screening: Not Applicable    Thank you!  [facility]       Date of Service:

## 2024-07-16 NOTE — NURSING NOTE
Patient confirms medications and allergies are accurate via patients echeck in completion, and or denies any changes since last reviewed/verified.     Current patient location: [address]  [facility] [place] [address]    Is the patient currently in the state of [place]? YES    Visit mode:TELEPHONE    If the visit is dropped, the patient can be reconnected by: TELEPHONE VISIT: Phone number:   Telephone Information:   Mobile [phone]       Will anyone else be joining the visit? NO  (If patient encounters technical issues they should call [phone]    How would you like to obtain your AVS? Mail a copy    Are changes needed to the allergy or medication list? No    Are refills needed on medications prescribed by this physician? NO    Reason for visit: Consult    Rhea KIRKLANDF

## 2024-07-16 NOTE — PROGRESS NOTES
Telephone Visit Details    Type of service: Telephone Visit   Phone call duration: 35 minutes   Originating Location (pt. Location): Home  Distant Location (provider location):  On-site        ELECTROPHYSIOLOGY TELEPHONE VISIT    Assessment/Recommendations   Assessment/Plan:    Dann Espinoza is a 78 year old male with past medical history significant for gout and hyperlipidemia.     Syncope   Lightheadedness, Dizziness   Symptomatic Sinus Pauses, up to 7.5 seconds   Patient with new symptoms of syncope, dizziness and lightheadedness beginning about one month ago. One episode occurring while driving about a month ago, he became dizzy, looked over to see if anyone was in the daryl, then pulled over onto the shoulder, his vision went black, he thinks he lost consciousness for several seconds. Broad work up done including labs, echo, zio patch and carotid US. Echo done with LVEF 55-60%. Zio done 6/14-6/24/24 with 283 pauses, longest lasting 7.5 seconds. Occurring during daytime and night time hours, symptoms associated to pauses. He has since continues to have occasional dizziness/lightheadedness that last for several seconds and then resolves. No further episodes of syncope.   I dicussed treatment for this is PPM implant. We discussed risks, benefits and technical aspects of the procedure. I discussed I can work to arrange this procedure urgently at [facility], he reports his family mentioned [facility] can do PPM and he would like to go there instead. I advised him I would recommend, in that scenario, that he present to the ER for expedited workup. Discussed he should not drive and should have someone else take him.        History of Present Illness/Subjective    Mr. Dann Espinoza is a 78 year old male who comes in today for EP consultation of dizziness, syncope, pauses on zio.    Patient is a 79 yo male with history of gout and hyperlipidemia. He was recently seen by his PCP on 6/3/24 with report of new syncope. One episode  occurring while driving about a month ago, he became dizzy, looked over to see if anyone was in the daryl, then pulled over onto the shoulder, his vision went black, he thinks he lost consciousness for several seconds.   Broad work up done including labs, echo, zio patch and carotid US. Echo done with LVEF 55-60%. Bilateral carotid US with mild atherosclerotic plaque at bifurcations. Zio done 6/14-6/24/24 with 283 pauses, longest lasting 7.5 seconds. Occurring during daytime and night time hours, symptoms associated to pauses. Urgent cardiology referral placed, however, patient reports he was out of town until today. He was advised to go to the ER several times for findings but did not do so.     He presents today for follow up. He reports several other episodes of feeling dizzy and lightheaded for several seconds before this resolves over the past couple of weeks. He has not had any further episodes of syncope. Denies any other new symptoms.     I have reviewed and updated the patient's Past Medical History, Social History, Family History and Medication List.     Cardiographics (Personally Reviewed) :   Echo: 6/14/2024   Interpretation Summary  No pericardial effusion is present.  Global and regional left ventricular function is normal with an EF of 55-60%.  Left ventricular diastolic function is normal.  Global right ventricular function is normal.  Trace mitral insufficiency is present.  Trace aortic insufficiency is present.  No aortic stenosis is present.    Ziopatch:   6/14-6/24/24: sinus  bpm, avg 68 bpm. 283 pauses longest lasting 7.5 sec.      Bilateral Carotid US 6/14/2024   FINDINGS: There is mild atherosclerotic plaquing at both carotid  bifurcations. There are no hemodynamically significant stenoses.  Forward flow was demonstrated in the vertebral arteries.                                                                   IMPRESSION: No hemodynamically significant stenoses are identified at  the  "carotid bifurcations.       Physical Examination   Ht 1.791 m (5' 10.5\")   Wt 102.1 kg (225 lb)   BMI 31.83 kg/m    Wt Readings from Last 3 Encounters:   07/16/24 102.1 kg (225 lb)   06/03/24 104.9 kg (231 lb 3.2 oz)   03/16/24 104.3 kg (230 lb)     Visit conducted via telephone.          Medications  Allergies   Current Outpatient Medications   Medication Sig Dispense Refill    allopurinol (ZYLOPRIM) 300 MG tablet Take 1 tablet by mouth once daily 90 tablet 3    aspirin (ASA) 81 MG EC tablet Take 1 tablet by mouth daily.      ATORVASTATIN CALCIUM PO Take 40 mg by mouth daily      INDOMETHACIN PO       tamsulosin (FLOMAX) 0.4 MG capsule Take 0.4 mg by mouth 2 times daily       No current facility-administered medications for this visit.      Allergies   Allergen Reactions    Azithromycin Rash     He developed a rash when on both Azithromycin and Ceftriaxone- unsure which one may have been the culprit      Ceftriaxone Rash     He developed a rash when he was on Ceftriaxone + Azithromycin    Levaquin [Levofloxacin] Rash         Lab Results (Personally Reviewed)    Chemistry/lipid CBC Cardiac Enzymes/BNP/TSH/INR   Lab Results   Component Value Date    BUN 17.2 06/03/2024     06/03/2024    CO2 28 06/03/2024     Creatinine   Date Value Ref Range Status   06/03/2024 1.14 0.67 - 1.17 mg/dL Final   04/11/2019 0.96 0.66 - 1.25 mg/dL Final       Lab Results   Component Value Date    CHOL 164 10/06/2016    HDL 49 10/06/2016    LDL 86 10/06/2016      Lab Results   Component Value Date    WBC 5.6 06/03/2024    HGB 14.9 06/03/2024    HCT 43.5 06/03/2024    MCV 88 06/03/2024     06/03/2024    Lab Results   Component Value Date    TSH 2.88 06/03/2024    INR 0.9 06/02/2017        I have reviewed the note as documented above.  This accurately captures the substance of my virtual visit with the patient. The patient states understanding and is agreeable with the plan.     Marlin Galvez PA-C  [facility]  Electrophysiology Consult Service  Pager: [phone]    Total time spent on patient visit, reviewing notes, imaging, labs, orders, and completing necessary documentation: 35 minutes.

## 2024-07-16 NOTE — PATIENT INSTRUCTIONS
You were seen in the Electrophysiology Clinic today by: Marlin Galvez    Plan:     Further Instructions: Please go to ER in [place] to be seen regarding needing a pacemaker for symptomatic cardiac pauses      If you have further questions, please utilize [facility] to contact us.     Your Care Team:    EP Cardiology   Telephone Number     Nurse Line  Ivelisse Finn, RN   Vanessa Negron, RN  Yfn Leiva RN   [phone]     For scheduling appointments:   [place]   [phone]   For procedure scheduling:    Brandee Vaca     [phone]   For the Device Clinic (Pacemakers, ICDs, Loop Recorders)    During business hours: [phone]  After business hours:   [phone]- select option 4 and ask for job code [id number].       On-call cardiologist for after hours or on weekends:   [phone], option #4, and ask to speak to the on-call cardiologist.     Cardiovascular Clinic:   [address] [place] [address]. [place] [address]      As always, Thank you for trusting us with your health care needs!

## 2024-07-25 PROBLEM — R55 SYNCOPE AND COLLAPSE: Status: ACTIVE | Noted: 2024-07-16

## 2024-07-25 PROBLEM — I45.5 SINUS PAUSE: Status: ACTIVE | Noted: 2024-07-16

## 2024-07-25 PROBLEM — Z95.0 PACEMAKER: Status: ACTIVE | Noted: 2024-07-17

## 2024-08-09 ENCOUNTER — OFFICE VISIT (OUTPATIENT)
Dept: FAMILY MEDICINE | Facility: OTHER | Age: 79
End: 2024-08-09
Attending: FAMILY MEDICINE
Payer: COMMERCIAL

## 2024-08-09 ENCOUNTER — TELEPHONE (OUTPATIENT)
Dept: FAMILY MEDICINE | Facility: OTHER | Age: 79
End: 2024-08-09

## 2024-08-09 VITALS
DIASTOLIC BLOOD PRESSURE: 82 MMHG | HEIGHT: 70 IN | TEMPERATURE: 97.1 F | RESPIRATION RATE: 17 BRPM | HEART RATE: 64 BPM | SYSTOLIC BLOOD PRESSURE: 128 MMHG | OXYGEN SATURATION: 96 % | WEIGHT: 225 LBS | BODY MASS INDEX: 32.21 KG/M2

## 2024-08-09 DIAGNOSIS — M54.2 NECK PAIN: ICD-10-CM

## 2024-08-09 DIAGNOSIS — Z00.00 ENCOUNTER FOR MEDICARE ANNUAL WELLNESS EXAM: Primary | ICD-10-CM

## 2024-08-09 DIAGNOSIS — Z95.0 PACEMAKER: ICD-10-CM

## 2024-08-09 DIAGNOSIS — E78.00 PURE HYPERCHOLESTEROLEMIA: ICD-10-CM

## 2024-08-09 PROBLEM — J18.9 PNEUMONIA OF RIGHT LOWER LOBE DUE TO INFECTIOUS ORGANISM: Status: RESOLVED | Noted: 2017-09-21 | Resolved: 2024-08-09

## 2024-08-09 PROBLEM — J18.9 PNEUMONIA: Status: RESOLVED | Noted: 2017-09-21 | Resolved: 2024-08-09

## 2024-08-09 PROCEDURE — G0439 PPPS, SUBSEQ VISIT: HCPCS | Performed by: FAMILY MEDICINE

## 2024-08-09 SDOH — HEALTH STABILITY: PHYSICAL HEALTH: ON AVERAGE, HOW MANY DAYS PER WEEK DO YOU ENGAGE IN MODERATE TO STRENUOUS EXERCISE (LIKE A BRISK WALK)?: 2 DAYS

## 2024-08-09 ASSESSMENT — PAIN SCALES - GENERAL: PAINLEVEL: NO PAIN (0)

## 2024-08-09 ASSESSMENT — SOCIAL DETERMINANTS OF HEALTH (SDOH): HOW OFTEN DO YOU GET TOGETHER WITH FRIENDS OR RELATIVES?: THREE TIMES A WEEK

## 2024-08-09 NOTE — TELEPHONE ENCOUNTER
3:09 PM    Reason for Call: Phone Call    Description: Pt called stating they were supposed to get paperwork on their pacemaker from PCP at [facility] on 08-. Would like to have information on why they need pacemaker, what the pacemaker does, etc. Please reach out to patient to discuss.     Was an appointment offered for this call? No  If yes : Appointment type              Date    Preferred method for responding to this message: Telephone Call  What is your phone number ?[phone]     If we cannot reach you directly, may we leave a detailed response at the number you provided? Yes    Can this message wait until your PCP/provider returns, if available today? Not applicable    Molly Bermudez

## 2024-08-09 NOTE — PROGRESS NOTES
Preventive Care Visit  [facility]  Nelly Mirza MD, Family Medicine  Aug 9, 2024      Assessment & Plan     Encounter for Medicare annual wellness exam  Discussed and updated preventive cares  Repeat wellness visit in one year   [facility]    Pure hypercholesterolemia  LDL (3/15/2023) 76  - c/w atorvastatin 40    Pacemaker d/t SA node dysfunction  No issues  Follows with [facility]    Neck pain  Will cancel appt with [facility] neuro[facility]        Counseling  Appropriate preventive services were addressed with this patient via screening, questionnaire, or discussion as appropriate for fall prevention, nutrition, physical activity, Tobacco-use cessation, weight loss and cognition.  Checklist reviewing preventive services available has been given to the patient.  Reviewed patient's diet, addressing concerns and/or questions.   He is at risk for lack of exercise and has been provided with information to increase physical activity for the benefit of his well-being.   The patient was instructed to see the dentist every 6 months.   He is at risk for psychosocial distress and has been provided with information to reduce risk.   The patient was provided with written information regarding signs of hearing loss.       See Patient Instructions    Return in about 1 year (around 8/9/2025) for Physical Exam.    Corky Quigley is a 78 year old, presenting for the following:  Physical        8/9/2024     2:08 PM   Additional Questions   Roomed by Deanna Cade   Accompanied by self       Health Care Directive  Patient does not have a Health Care Directive or Living Will: Discussed advance care planning with patient; however, patient declined at this time.    HPI    Hyperlipidemia Follow-Up    Are you regularly taking any medication or supplement to lower your cholesterol?   Yes- Atorvastatin  Are you having muscle aches or other side effects that you think could be caused by your cholesterol  lowering medication?  No    # neck pain  - appt with [facility] on 8/20/2024 - > will cancel       # pacemaker  - follows with [facility] EP        8/9/2024   General Health   How would you rate your overall physical health? Good   Feel stress (tense, anxious, or unable to sleep) Only a little      (!) STRESS CONCERN      8/9/2024   Nutrition   Diet: Regular (no restrictions)            8/9/2024   Exercise   Days per week of moderate/strenous exercise 2 days      (!) EXERCISE CONCERN      8/9/2024   Social Factors   Frequency of gathering with friends or relatives Three times a week   Worry food won't last until get money to buy more No   Food not last or not have enough money for food? No   Do you have housing? (Housing is defined as stable permanent housing and does not include staying ouside in a car, in a tent, in an abandoned building, in an overnight shelter, or couch-surfing.) Yes   Are you worried about losing your housing? No   Lack of transportation? No   Unable to get utilities (heat,electricity)? No            8/9/2024   Fall Risk   Fallen 2 or more times in the past year? No   Trouble with walking or balance? No             8/9/2024   Activities of Daily Living- Home Safety   Needs help with the following daily activites None of the above   Safety concerns in the home None of the above            8/9/2024   Dental   Dentist two times every year? (!) NO            8/9/2024   Hearing Screening   Hearing concerns? (!) I NEED TO ASK PEOPLE TO SPEAK UP OR REPEAT THEMSELVES.            8/9/2024   Driving Risk Screening   Patient/family members have concerns about driving No            8/9/2024   General Alertness/Fatigue Screening   Have you been more tired than usual lately? No            8/9/2024   Urinary Incontinence Screening   Bothered by leaking urine in past 6 months No            8/9/2024   TB Screening   Were you born outside of the [place]? No            Today's PHQ-2 Score:       7/16/2024     8:42  AM   PHQ-2 ( 1999 [employer])   Q1: Little interest or pleasure in doing things 0   Q2: Feeling down, depressed or hopeless 0   PHQ-2 Score 0         8/9/2024   Substance Use   Alcohol more than 3/day or more than 7/wk No   Do you have a current opioid prescription? No   How severe/bad is pain from 1 to 10? 0/10 (No Pain)   Do you use any other substances recreationally? No    (!) PRESCRIPTION DRUGS    (!) OTHER       Multiple values from one day are sorted in reverse-chronological order     Social History     Tobacco Use    Smoking status: Former    Smokeless tobacco: Never    Tobacco comments:     Quit 50 years ago (2022)   Vaping Use    Vaping status: Never Used   Substance Use Topics    Alcohol use: No    Drug use: No     ASCVD Risk   The 10-year ASCVD risk score (Pricila BRANDON, et al., 2019) is: 28.7%    Values used to calculate the score:      Age: 78 years      Sex: Male      Is Non- : No      Diabetic: No      Tobacco smoker: No      Systolic Blood Pressure: 128 mmHg      Is BP treated: No      HDL Cholesterol: 39 mg/dL      Total Cholesterol: 133 mg/dL      # Wellness:  - Immunizations: UTD  - Lipids:  [facility]   - Dexa scan:  - Colon cancer screening: due - [facility]  - PSA: NA d/t age  - Lung cancer screening: quit 1970s  - AAA screening:       Reviewed and updated as needed this visit by Provider   Tobacco  Allergies  Meds  Problems  Med Hx  Surg Hx  Fam Hx            Current providers sharing in care for this patient include:  Patient Care Team:  Nelly Mirza MD as PCP - General (Family Medicine)  Nelly Mirza MD as Assigned PCP  Marlin Galvez PA-C as Assigned Heart and Vascular Provider    The following health maintenance items are reviewed in Epic and correct as of today:  Health Maintenance   Topic Date Due    RSV VACCINE (Pregnancy & 60+) (1 - 1-dose 60+ series) Never done    COVID-19 Vaccine (5 - 2023-24 season) 09/01/2023     "LIPID  03/15/2024    INFLUENZA VACCINE (1) 09/01/2024    MEDICARE ANNUAL WELLNESS VISIT  08/09/2025    FALL RISK ASSESSMENT  08/09/2025    GLUCOSE  06/03/2027    ADVANCE CARE PLANNING  08/09/2029    DTAP/TDAP/TD IMMUNIZATION (3 - Td or Tdap) 08/15/2032    HEPATITIS C SCREENING  Completed    PHQ-2 (once per calendar year)  Completed    Pneumococcal Vaccine: 65+ Years  Completed    ZOSTER IMMUNIZATION  Completed    IPV IMMUNIZATION  Aged Out    HPV IMMUNIZATION  Aged Out    MENINGITIS IMMUNIZATION  Aged Out    RSV MONOCLONAL ANTIBODY  Aged Out    COLORECTAL CANCER SCREENING  Discontinued    LUNG CANCER SCREENING  Discontinued         Review of Systems  Constitutional, HEENT, cardiovascular, pulmonary, gi and gu systems are negative, except as otherwise noted.     Objective    Exam  /82   Pulse 64   Temp 97.1  F (36.2  C) (Tympanic)   Resp 17   Ht 1.778 m (5' 10\")   Wt 102.1 kg (225 lb)   SpO2 96%   BMI 32.28 kg/m     Estimated body mass index is 32.28 kg/m  as calculated from the following:    Height as of this encounter: 1.778 m (5' 10\").    Weight as of this encounter: 102.1 kg (225 lb).    Physical Exam  Constitutional:       General: He is not in acute distress.     Appearance: He is not ill-appearing.   Cardiovascular:      Rate and Rhythm: Normal rate and regular rhythm.      Heart sounds: No murmur heard.  Pulmonary:      Effort: Pulmonary effort is normal. No respiratory distress.      Breath sounds: No wheezing or rales.   Musculoskeletal:      Right lower leg: No edema.      Left lower leg: No edema.   Neurological:      Mental Status: He is alert.   Psychiatric:         Mood and Affect: Mood normal.       Labs reviewed         8/9/2024   Mini Cog   Mini-Cog Not Completed (choose reason) Patient declines          Patient declines, there are NO concerns for cognitive deficits.           Signed Electronically by: Nelly Mirza MD    "

## 2024-08-09 NOTE — PATIENT INSTRUCTIONS
283 pauses occurred with longest lasting 7.5 seconds.       Patient Education   Preventive Care Advice   This is general advice given by our system to help you stay healthy. However, your care team may have specific advice just for you. Please talk to your care team about your preventive care needs.  Nutrition  Eat 5 or more servings of fruits and vegetables each day.  Try wheat bread, brown rice and whole grain pasta (instead of white bread, rice, and pasta).  Get enough calcium and vitamin D. Check the label on foods and aim for 100% of the RDA (recommended daily allowance).  Lifestyle  Exercise at least 150 minutes each week  (30 minutes a day, 5 days a week).  Do muscle strengthening activities 2 days a week. These help control your weight and prevent disease.  No smoking.  Wear sunscreen to prevent skin cancer.  Have a dental exam and cleaning every 6 months.  Yearly exams  See your health care team every year to talk about:  Any changes in your health.  Any medicines your care team has prescribed.  Preventive care, family planning, and ways to prevent chronic diseases.  Shots (vaccines)   HPV shots (up to age 26), if you've never had them before.  Hepatitis B shots (up to age 59), if you've never had them before.  COVID-19 shot: Get this shot when it's due.  Flu shot: Get a flu shot every year.  Tetanus shot: Get a tetanus shot every 10 years.  Pneumococcal, hepatitis A, and RSV shots: Ask your care team if you need these based on your risk.  Shingles shot (for age 50 and up)  General health tests  Diabetes screening:  Starting at age 35, Get screened for diabetes at least every 3 years.  If you are younger than age 35, ask your care team if you should be screened for diabetes.  Cholesterol test: At age 39, start having a cholesterol test every 5 years, or more often if advised.  Bone density scan (DEXA): At age 50, ask your care team if you should have this scan for osteoporosis (brittle bones).  Hepatitis C:  Get tested at least once in your life.  STIs (sexually transmitted infections)  Before age 24: Ask your care team if you should be screened for STIs.  After age 24: Get screened for STIs if you're at risk. You are at risk for STIs (including HIV) if:  You are sexually active with more than one person.  You don't use condoms every time.  You or a partner was diagnosed with a sexually transmitted infection.  If you are at risk for HIV, ask about PrEP medicine to prevent HIV.  Get tested for HIV at least once in your life, whether you are at risk for HIV or not.  Cancer screening tests  Cervical cancer screening: If you have a cervix, begin getting regular cervical cancer screening tests starting at age 21.  Breast cancer scan (mammogram): If you've ever had breasts, begin having regular mammograms starting at age 40. This is a scan to check for breast cancer.  Colon cancer screening: It is important to start screening for colon cancer at age 45.  Have a colonoscopy test every 10 years (or more often if you're at risk) Or, ask your provider about stool tests like a FIT test every year or Cologuard test every 3 years.  To learn more about your testing options, visit:   .  For help making a decision, visit:   https://bit.ly/kg05196.  Prostate cancer screening test: If you have a prostate, ask your care team if a prostate cancer screening test (PSA) at age 55 is right for you.  Lung cancer screening: If you are a current or former smoker ages 50 to 80, ask your care team if ongoing lung cancer screenings are right for you.  For informational purposes only. Not to replace the advice of your health care provider. Copyright   2023 [employer]. All rights reserved. Clinically reviewed by the [facility] Transitions Program. [employer] [id number] - REV 01/24.  Hearing Loss: Care Instructions  Overview     Hearing loss is a sudden or slow decrease in how well you hear. It can range from slight to profound.  Permanent hearing loss can occur with aging. It also can happen when you are exposed long-term to loud noise. Examples include listening to loud music, riding motorcycles, or being around other loud machines.  Hearing loss can affect your work and home life. It can make you feel lonely or depressed. You may feel that you have lost your independence. But hearing aids and other devices can help you hear better and feel connected to others.  Follow-up care is a key part of your treatment and safety. Be sure to make and go to all appointments, and call your doctor if you are having problems. It's also a good idea to know your test results and keep a list of the medicines you take.  How can you care for yourself at home?  Avoid loud noises whenever possible. This helps keep your hearing from getting worse.  Always wear hearing protection around loud noises.  Wear a hearing aid as directed.  A professional can help you pick a hearing aid that will work best for you.  You can also get hearing aids over the counter for mild to moderate hearing loss.  Have hearing tests as your doctor suggests. They can show whether your hearing has changed. Your hearing aid may need to be adjusted.  Use other devices as needed. These may include:  Telephone amplifiers and hearing aids that can connect to a television, stereo, radio, or microphone.  Devices that use lights or vibrations. These alert you to the doorbell, a ringing telephone, or a baby monitor.  Television closed-captioning. This shows the words at the bottom of the screen. Most new TVs can do this.  TTY (text telephone). This lets you type messages back and forth on the telephone instead of talking or listening. These devices are also called TDD. When messages are typed on the keyboard, they are sent over the phone line to a receiving TTY. The message is shown on a monitor.  Use text messaging, social media, and email if it is hard for you to communicate by telephone.  Try to  "learn a listening technique called speechreading. It is not lipreading. You pay attention to people's gestures, expressions, posture, and tone of voice. These clues can help you understand what a person is saying. Face the person you are talking to, and have them face you. Make sure the lighting is good. You need to see the other person's face clearly.  Think about counseling if you need help to adjust to your hearing loss.  When should you call for help?  Watch closely for changes in your health, and be sure to contact your doctor if:    You think your hearing is getting worse.     You have new symptoms, such as dizziness or nausea.   Where can you learn more?  Go to https://www.[employer].com.net/patiented  Enter R798 in the search box to learn more about \"Hearing Loss: Care Instructions.\"  Current as of: September 27, 2023               Content Version: 14.0    7877-4533 [employer].   Care instructions adapted under license by your healthcare professional. If you have questions about a medical condition or this instruction, always ask your healthcare professional. [employer] disclaims any warranty or liability for your use of this information.      Substance Use Disorder: Care Instructions  Overview     You can improve your life and health by stopping your use of alcohol or drugs. When you don't drink or use drugs, you may feel and sleep better. You may get along better with your family, friends, and coworkers. There are medicines and programs that can help with substance use disorder.  How can you care for yourself at home?  Here are some ways to help you stay sober and prevent relapse.  If you have been given medicine to help keep you sober or reduce your cravings, be sure to take it exactly as prescribed.  Talk to your doctor about programs that can help you stop using drugs or drinking alcohol.  Do not keep alcohol or drugs in your home.  Plan ahead. Think about what you'll say if " other people ask you to drink or use drugs. Try not to spend time with people who drink or use drugs.  Use the time and money spent on drinking or drugs to do something that's important to you.  Preventing a relapse  Have a plan to deal with relapse. Learn to recognize changes in your thinking that lead you to drink or use drugs. Get help before you start to drink or use drugs again.  Try to stay away from situations, friends, or places that may lead you to drink or use drugs.  If you feel the need to drink alcohol or use drugs again, seek help right away. Call a trusted friend or family member. Some people get support from organizations such as Narcotics Anonymous or [employer] or from treatment facilities.  If you relapse, get help as soon as you can. Some people make a plan with another person that outlines what they want that person to do for them if they relapse. The plan usually includes how to handle the relapse and who to notify in case of relapse.  Don't give up. Remember that a relapse doesn't mean that you have failed. Use the experience to learn the triggers that lead you to drink or use drugs. Then quit again. Recovery is a lifelong process. Many people have several relapses before they are able to quit for good.  Follow-up care is a key part of your treatment and safety. Be sure to make and go to all appointments, and call your doctor if you are having problems. It's also a good idea to know your test results and keep a list of the medicines you take.  When should you call for help?   Call 911  anytime you think you may need emergency care. For example, call if you or someone else:    Has overdosed or has withdrawal signs. Be sure to tell the emergency workers that you are or someone else is using or trying to quit using drugs. Overdose or withdrawal signs may include:  Losing consciousness.  Seizure.  Seeing or hearing things that aren't there (hallucinations).     Is thinking or talking about  "suicide or harming others.   Where to get help 24 hours a day, 7 days a week   If you or someone you know talks about suicide, self-harm, a mental health crisis, a substance use crisis, or any other kind of emotional distress, get help right away. You can:    Call the Suicide and Crisis Lifeline at 988.     Call 5-357-438-TALK (1-990.121.1841).     Text HOME to 482443 to access the Crisis Text Line.   Consider saving these numbers in your phone.  Go to [employer] for more information or to chat online.  Call your doctor now or seek immediate medical care if:    You are having withdrawal symptoms. These may include nausea or vomiting, sweating, shakiness, and anxiety.   Watch closely for changes in your health, and be sure to contact your doctor if:    You have a relapse.     You need more help or support to stop.   Where can you learn more?  Go to https://www.Better[employer].net/patiented  Enter H573 in the search box to learn more about \"Substance Use Disorder: Care Instructions.\"  Current as of: November 15, 2023               Content Version: 14.0    4716-3615 [employer]   Care instructions adapted under license by your healthcare professional. If you have questions about a medical condition or this instruction, always ask your healthcare professional. [employer] disclaims any warranty or liability for your use of this information.         "

## 2024-08-27 DIAGNOSIS — M10.00 IDIOPATHIC GOUT, UNSPECIFIED CHRONICITY, UNSPECIFIED SITE: ICD-10-CM

## 2024-08-27 RX ORDER — ALLOPURINOL 300 MG/1
TABLET ORAL
Qty: 90 TABLET | Refills: 3 | Status: SHIPPED | OUTPATIENT
Start: 2024-08-27

## 2024-08-27 NOTE — TELEPHONE ENCOUNTER
Allopurinol 300 MG Oral Tablet       Last Written Prescription Date:  08/29/2023  Last Fill Quantity: 90,   # refills: 3  Last Office Visit: 08/09/2024  Future Office visit:       Routing refill request to provider for review/approval because:  Gout Agents Protocol [id number]/27/2024 09:25 AM   Protocol Details Has Uric Acid on file in past 12 months and value is less than 6     Kimberly Boecker, RN

## 2024-09-30 ENCOUNTER — TELEPHONE (OUTPATIENT)
Dept: FAMILY MEDICINE | Facility: OTHER | Age: 79
End: 2024-09-30

## 2024-09-30 ENCOUNTER — ANCILLARY PROCEDURE (OUTPATIENT)
Dept: GENERAL RADIOLOGY | Facility: OTHER | Age: 79
End: 2024-09-30
Attending: FAMILY MEDICINE
Payer: COMMERCIAL

## 2024-09-30 ENCOUNTER — OFFICE VISIT (OUTPATIENT)
Dept: FAMILY MEDICINE | Facility: OTHER | Age: 79
End: 2024-09-30
Attending: FAMILY MEDICINE
Payer: COMMERCIAL

## 2024-09-30 VITALS
HEIGHT: 70 IN | BODY MASS INDEX: 32.99 KG/M2 | HEART RATE: 70 BPM | WEIGHT: 230.4 LBS | RESPIRATION RATE: 17 BRPM | TEMPERATURE: 97.6 F | DIASTOLIC BLOOD PRESSURE: 74 MMHG | OXYGEN SATURATION: 94 % | SYSTOLIC BLOOD PRESSURE: 140 MMHG

## 2024-09-30 DIAGNOSIS — M25.561 CHRONIC PAIN OF RIGHT KNEE: ICD-10-CM

## 2024-09-30 DIAGNOSIS — G89.29 CHRONIC PAIN OF RIGHT KNEE: Primary | ICD-10-CM

## 2024-09-30 DIAGNOSIS — G89.29 CHRONIC PAIN OF RIGHT KNEE: ICD-10-CM

## 2024-09-30 DIAGNOSIS — M25.561 CHRONIC PAIN OF RIGHT KNEE: Primary | ICD-10-CM

## 2024-09-30 PROCEDURE — 73562 X-RAY EXAM OF KNEE 3: CPT | Mod: TC,RT

## 2024-09-30 PROCEDURE — G2211 COMPLEX E/M VISIT ADD ON: HCPCS | Performed by: FAMILY MEDICINE

## 2024-09-30 PROCEDURE — 99213 OFFICE O/P EST LOW 20 MIN: CPT | Performed by: FAMILY MEDICINE

## 2024-09-30 PROCEDURE — G0463 HOSPITAL OUTPT CLINIC VISIT: HCPCS | Mod: 25

## 2024-09-30 RX ORDER — HYDROCODONE BITARTRATE AND ACETAMINOPHEN 5; 325 MG/1; MG/1
1 TABLET ORAL 2 TIMES DAILY PRN
Qty: 10 TABLET | Refills: 0 | Status: SHIPPED | OUTPATIENT
Start: 2024-09-30

## 2024-09-30 ASSESSMENT — PAIN SCALES - GENERAL: PAINLEVEL: WORST PAIN (10)

## 2024-09-30 NOTE — TELEPHONE ENCOUNTER
9:09 AM    Reason for Call: OVERBOOK    Patient is having the following symptoms: Patient needs to be seen for horrible knee pain. days.    The patient is requesting an appointment for Overbook with .    Was an appointment offered for this call? No  If yes : Appointment type              Date    Preferred method for responding to this message: Telephone Call  What is your phone number ?  [phone]    If we cannot reach you directly, may we leave a detailed response at the number you provided? Yes    Can this message wait until your PCP/provider returns, if unavailable today? Provider is in    Nani Maldonado

## 2024-09-30 NOTE — PATIENT INSTRUCTIONS
Prescription sent for norco (hydrocodone). Do not drive when taking hydrocodone     Ice  your knee three times per day. Use a towel on your skin, do not put ice directly on your skin.     Referral to Dr Beaulieu (orthopedics)

## 2024-09-30 NOTE — PROGRESS NOTES
Assessment & Plan     Chronic pain of right knee  Exam consistent with meniscus issue. Xray negative for any concerning findings. Will do a short Rx of norco (previously tolerated) until seen by orthopedics.   [place] PDMP reviewed and appropriate    - XR Knee Right 3 Views (Clinic Performed); Future  - Orthopedic  Referral; Future  - HYDROcodone-acetaminophen (NORCO) 5-325 MG tablet; Take 1 tablet by mouth 2 times daily as needed for severe pain.  - c/w aleve, APAP (under 4g / day)  - encourage to ice three times per day       The longitudinal plan of care for the diagnosis(es)/condition(s) as documented were addressed during this visit. Due to the added complexity in care, I will continue to support Dann in the subsequent management and with ongoing continuity of care.    See Patient Instructions    Return if symptoms worsen or fail to improve.    Subjective   Dann is a 78 year old, presenting for the following health issues:  Musculoskeletal Problem        9/30/2024     3:37 PM   Additional Questions   Roomed by Deanna Cade   Accompanied by self     History of Present Illness       Reason for visit:  Knee is hurting  Symptom onset:  1-2 weeks ago   He is taking medications regularly.         Musculoskeletal problem/pain - Right knee pain     Duration: On and off for the last few weeks  Description  Location: right knee  Intensity:  severe, 10/10  Accompanying signs and symptoms: weakness of knee due to the pain  History  Previous similar problem: no   Previous evaluation:  none  Precipitating or alleviating factors:  Trauma or overuse: no   Aggravating factors include: climbing stairs, lifting, and overuse  Therapies tried and outcome: deep heating rub, roll-on icy hot, tylenol and aspirin    - started last Friday, no unusual activity   - no f/c   - using IcyHot patches   - took two Aleve this morning   - increased walking one to two weeks ago         Review of Systems  Constitutional, HEENT,  "cardiovascular, pulmonary, gi and gu systems are negative, except as otherwise noted.      Objective    BP (!) 140/74   Pulse 70   Temp 97.6  F (36.4  C) (Tympanic)   Resp 17   Ht 1.778 m (5' 10\")   Wt 104.5 kg (230 lb 6.4 oz)   SpO2 94%   BMI 33.06 kg/m    Body mass index is 33.06 kg/m .  Physical Exam  Constitutional:       General: He is not in acute distress.     Appearance: He is not ill-appearing.   Cardiovascular:      Rate and Rhythm: Normal rate and regular rhythm.      Heart sounds: No murmur heard.  Pulmonary:      Effort: Pulmonary effort is normal. No respiratory distress.      Breath sounds: No wheezing or rales.   Musculoskeletal:      Comments: Right knee: effusion. TTP over medial and lateral joint lines. Not TTP over patella. Pablito positive with minimal rotation. Full extension / flexion. Painful getting up from chair, but no issues with ambulation. Using a cane    Neurological:      Mental Status: He is alert.          XR Knee Right 3 Views (Clinic Performed)    Result Date: 9/30/2024  Exam: XR KNEE RIGHT 3 VIEWS History:Male, age 78 years, Chronic pain of right knee; Chronic pain of right knee Comparison:  No relevant prior imaging. Technique: Three views are submitted. Findings: Bones are normally mineralized. No evidence of acute or subacute fracture.  No evidence of dislocation.        Impression: No evidence of acute or subacute bony abnormality. FAROOQ COOPER MD   SYSTEM ID:  [id number]           Signed Electronically by: Nelly Mirza MD    "

## 2024-10-07 ENCOUNTER — TELEPHONE (OUTPATIENT)
Dept: FAMILY MEDICINE | Facility: OTHER | Age: 79
End: 2024-10-07

## 2024-10-07 DIAGNOSIS — M25.561 CHRONIC PAIN OF RIGHT KNEE: Primary | ICD-10-CM

## 2024-10-07 DIAGNOSIS — G89.29 CHRONIC PAIN OF RIGHT KNEE: Primary | ICD-10-CM

## 2024-10-07 NOTE — TELEPHONE ENCOUNTER
October 7, 2024    Patient requesting new Orthopedic referral to  Dr Raines. Patient scheduled wi Dr Beaulieu 10/30 (on cancel list), but does not want to wait that long. Patient requesting call back from nurse when able please    -Kyleigh Ocasio on 10/7/2024 at 9:21 AM

## 2024-11-26 ENCOUNTER — OFFICE VISIT (OUTPATIENT)
Dept: FAMILY MEDICINE | Facility: OTHER | Age: 79
End: 2024-11-26
Attending: FAMILY MEDICINE
Payer: COMMERCIAL

## 2024-11-26 ENCOUNTER — LAB (OUTPATIENT)
Dept: LAB | Facility: OTHER | Age: 79
End: 2024-11-26
Attending: FAMILY MEDICINE
Payer: COMMERCIAL

## 2024-11-26 VITALS
BODY MASS INDEX: 33.11 KG/M2 | WEIGHT: 231.3 LBS | DIASTOLIC BLOOD PRESSURE: 68 MMHG | HEART RATE: 84 BPM | RESPIRATION RATE: 17 BRPM | HEIGHT: 70 IN | SYSTOLIC BLOOD PRESSURE: 118 MMHG | TEMPERATURE: 97.9 F | OXYGEN SATURATION: 93 %

## 2024-11-26 DIAGNOSIS — Z95.0 PACEMAKER: Primary | ICD-10-CM

## 2024-11-26 DIAGNOSIS — I49.8 ATRIAL DYSRHYTHMIA: ICD-10-CM

## 2024-11-26 DIAGNOSIS — I48.0 PAROXYSMAL ATRIAL FIBRILLATION (H): ICD-10-CM

## 2024-11-26 DIAGNOSIS — E78.00 PURE HYPERCHOLESTEROLEMIA: ICD-10-CM

## 2024-11-26 DIAGNOSIS — N52.9 ERECTILE DYSFUNCTION, UNSPECIFIED ERECTILE DYSFUNCTION TYPE: ICD-10-CM

## 2024-11-26 LAB
ANION GAP SERPL CALCULATED.3IONS-SCNC: 9 MMOL/L (ref 7–15)
BUN SERPL-MCNC: 17.4 MG/DL (ref 8–23)
CALCIUM SERPL-MCNC: 10 MG/DL (ref 8.8–10.4)
CHLORIDE SERPL-SCNC: 102 MMOL/L (ref 98–107)
CHOLEST SERPL-MCNC: 164 MG/DL
CREAT SERPL-MCNC: 1.13 MG/DL (ref 0.67–1.17)
EGFRCR SERPLBLD CKD-EPI 2021: 66 ML/MIN/1.73M2
ERYTHROCYTE [DISTWIDTH] IN BLOOD BY AUTOMATED COUNT: 14.6 % (ref 10–15)
FASTING STATUS PATIENT QL REPORTED: NO
FASTING STATUS PATIENT QL REPORTED: NO
GLUCOSE SERPL-MCNC: 105 MG/DL (ref 70–99)
HCO3 SERPL-SCNC: 27 MMOL/L (ref 22–29)
HCT VFR BLD AUTO: 42.5 % (ref 40–53)
HDLC SERPL-MCNC: 56 MG/DL
HGB BLD-MCNC: 14.4 G/DL (ref 13.3–17.7)
LDLC SERPL CALC-MCNC: 66 MG/DL
MAGNESIUM SERPL-MCNC: 2.2 MG/DL (ref 1.7–2.3)
MCH RBC QN AUTO: 30.1 PG (ref 26.5–33)
MCHC RBC AUTO-ENTMCNC: 33.9 G/DL (ref 31.5–36.5)
MCV RBC AUTO: 89 FL (ref 78–100)
NONHDLC SERPL-MCNC: 108 MG/DL
PLATELET # BLD AUTO: 263 10E3/UL (ref 150–450)
POTASSIUM SERPL-SCNC: 4.5 MMOL/L (ref 3.4–5.3)
RBC # BLD AUTO: 4.79 10E6/UL (ref 4.4–5.9)
SODIUM SERPL-SCNC: 138 MMOL/L (ref 135–145)
TRIGL SERPL-MCNC: 208 MG/DL
TSH SERPL DL<=0.005 MIU/L-ACNC: 2.46 UIU/ML (ref 0.3–4.2)
WBC # BLD AUTO: 6.6 10E3/UL (ref 4–11)

## 2024-11-26 PROCEDURE — 82565 ASSAY OF CREATININE: CPT | Mod: ZL

## 2024-11-26 PROCEDURE — G0463 HOSPITAL OUTPT CLINIC VISIT: HCPCS | Mod: 25

## 2024-11-26 PROCEDURE — 36415 COLL VENOUS BLD VENIPUNCTURE: CPT | Mod: ZL

## 2024-11-26 PROCEDURE — 80048 BASIC METABOLIC PNL TOTAL CA: CPT | Mod: ZL

## 2024-11-26 PROCEDURE — 84443 ASSAY THYROID STIM HORMONE: CPT | Mod: ZL

## 2024-11-26 PROCEDURE — 85041 AUTOMATED RBC COUNT: CPT | Mod: ZL

## 2024-11-26 PROCEDURE — 80061 LIPID PANEL: CPT | Mod: ZL

## 2024-11-26 PROCEDURE — 85018 HEMOGLOBIN: CPT | Mod: ZL

## 2024-11-26 PROCEDURE — 82465 ASSAY BLD/SERUM CHOLESTEROL: CPT | Mod: ZL

## 2024-11-26 PROCEDURE — 83735 ASSAY OF MAGNESIUM: CPT | Mod: ZL

## 2024-11-26 RX ORDER — SILDENAFIL 50 MG/1
TABLET, FILM COATED ORAL
Qty: 4 TABLET | Refills: 0 | Status: SHIPPED | OUTPATIENT
Start: 2024-11-26

## 2024-11-26 ASSESSMENT — PAIN SCALES - GENERAL: PAINLEVEL_OUTOF10: NO PAIN (0)

## 2024-11-26 NOTE — PATIENT INSTRUCTIONS
Referral placed to cardiology  Start eliquis (anticoaguation, blood thinner).   Decrease your aspirin to 81mg every day     We will call or MyChart you with your lab results.

## 2024-11-26 NOTE — PROGRESS NOTES
Assessment & Plan     Pacemaker d/t SA node dysfunction  Pacemaker monitored with Robert ANGELO. New onset afib  - Adult Cardiology Eval  Referral    Paroxysmal atrial fibrillation (H) / Atrial dysrhythmia  New onset afib.  Discussed risks and benefits of anticoagulation including increased risk for intracranial bleed and GI bleed.  Discussed benefits of reduced risk of stroke. [facility]  Labs stable. HR at goal   - Adult Cardiology Eval [facility] Referral  - apixaban ANTICOAGULANT (ELIQUIS) 5 MG tablet; Take 1 tablet (5 mg) by mouth 2 times daily.  - CBC with platelets; Future  - Basic metabolic panel; Future  - TSH with free T4 reflex; Future  - Magnesium; Future    Pure hypercholesterolemia  LDL today of 66  - Lipid Profile (Chol, Trig, HDL, LDL calc); Future  - c/w atorvastatin 40mg     Erectile dysfunction, unspecified erectile dysfunction type  - sildenafil (VIAGRA) 50 MG tablet; Take 50mg (1 tablet) 1/2 hr to 4 hrs before sexual activity. May take one additional tablet for a total of 100mg in a 24hr period if needed. - do not take with nitroglycerin, it can cause a dangerous drop in blood pressure.      The longitudinal plan of care for the diagnosis(es)/condition(s) as documented were addressed during this visit. Due to the added complexity in care, I will continue to support Dann in the subsequent management and with ongoing continuity of care.        See Patient Instructions    Return in about 3 months (around 2/26/2025) for Recheck .    Subjective   Dann is a 79 year old, presenting for the following health issues:  Atrial Fib        11/26/2024    11:12 AM   Additional Questions   Roomed by Deanna Cade   Accompanied by self     HPI       Concern - palpations  Onset: 11/25/2024  Description: Atrial Fib  Intensity: severe  Progression of Symptoms:  waxing and waning  Accompanying Signs & Symptoms: Pacemaker was read which was normal 11/07/2024 is when  "machine showed Afib for approx. 3 hours  Previous history of similar problem: na  Precipitating factors:        Worsened by: na  Alleviating factors:        Improved by: na  Therapies tried and outcome: None    - s/p pacemaker d/t pauses  - pacemaker read (11/25/2024) with VT, NSVT, Afib with HR 70 to 140s  - (6) minutes of atrial dysrhythmia and 2 hr and 42 minutes of afib   - did not feel afib       # Immunizations: covid,  Refused      Review of Systems  Constitutional, HEENT, cardiovascular, pulmonary, gi and gu systems are negative, except as otherwise noted.      Objective    /68   Pulse 84   Temp 97.9  F (36.6  C) (Tympanic)   Resp 17   Ht 1.778 m (5' 10\")   Wt 104.9 kg (231 lb 4.8 oz)   SpO2 93%   BMI 33.19 kg/m    Body mass index is 33.19 kg/m .  Physical Exam  Constitutional:       General: He is not in acute distress.     Appearance: He is not ill-appearing.   Cardiovascular:      Rate and Rhythm: Normal rate and regular rhythm.      Heart sounds: No murmur heard.  Pulmonary:      Effort: Pulmonary effort is normal. No respiratory distress.      Breath sounds: No wheezing or rales.   Neurological:      Mental Status: He is alert.          Results for orders placed or performed in visit on 11/26/24   CBC with platelets     Status: Normal   Result Value Ref Range    WBC Count 6.6 4.0 - 11.0 10e3/uL    RBC Count 4.79 4.40 - 5.90 10e6/uL    Hemoglobin 14.4 13.3 - 17.7 g/dL    Hematocrit 42.5 40.0 - 53.0 %    MCV 89 78 - 100 fL    MCH 30.1 26.5 - 33.0 pg    MCHC 33.9 31.5 - 36.5 g/dL    RDW 14.6 10.0 - 15.0 %    Platelet Count 263 150 - 450 10e3/uL   Basic metabolic panel     Status: Abnormal   Result Value Ref Range    Sodium 138 135 - 145 mmol/L    Potassium 4.5 3.4 - 5.3 mmol/L    Chloride 102 98 - 107 mmol/L    Carbon Dioxide (CO2) 27 22 - 29 mmol/L    Anion Gap 9 7 - 15 mmol/L    Urea Nitrogen 17.4 8.0 - 23.0 mg/dL    Creatinine 1.13 0.67 - 1.17 mg/dL    GFR Estimate 66 >60 mL/min/1.73m2    " Calcium 10.0 8.8 - 10.4 mg/dL    Glucose 105 (H) 70 - 99 mg/dL    Patient Fasting > 8hrs? No    TSH with free T4 reflex     Status: Normal   Result Value Ref Range    TSH 2.46 0.30 - 4.20 uIU/mL   Magnesium     Status: Normal   Result Value Ref Range    Magnesium 2.2 1.7 - 2.3 mg/dL   Lipid Profile (Chol, Trig, HDL, LDL calc)     Status: Abnormal   Result Value Ref Range    Cholesterol 164 <200 mg/dL    Triglycerides 208 (H) <150 mg/dL    Direct Measure HDL 56 >=40 mg/dL    LDL Cholesterol Calculated 66 <100 mg/dL    Non HDL Cholesterol 108 <130 mg/dL    Patient Fasting > 8hrs? No     Narrative    Cholesterol  Desirable: < 200 mg/dL  Borderline High: 200 - 239 mg/dL  High: >= 240 mg/dL    Triglycerides  Normal: < 150 mg/dL  Borderline High: 150 - 199 mg/dL  High: 200-499 mg/dL  Very High: >= 500 mg/dL    Direct Measure HDL  Female: >= 50 mg/dL   Male: >= 40 mg/dL    LDL Cholesterol  Desirable: < 100 mg/dL  Above Desirable: 100 - 129 mg/dL   Borderline High: 130 - 159 mg/dL   High:  160 - 189 mg/dL   Very High: >= 190 mg/dL    Non HDL Cholesterol  Desirable: < 130 mg/dL  Above Desirable: 130 - 159 mg/dL  Borderline High: 160 - 189 mg/dL  High: 190 - 219 mg/dL  Very High: >= 220 mg/dL             Signed Electronically by: Nelly Mirza MD

## 2024-12-10 ENCOUNTER — ANCILLARY PROCEDURE (OUTPATIENT)
Dept: CARDIOLOGY | Facility: OTHER | Age: 79
End: 2024-12-10
Attending: INTERNAL MEDICINE
Payer: COMMERCIAL

## 2024-12-10 ENCOUNTER — OFFICE VISIT (OUTPATIENT)
Dept: CARDIOLOGY | Facility: OTHER | Age: 79
End: 2024-12-10
Attending: FAMILY MEDICINE
Payer: COMMERCIAL

## 2024-12-10 VITALS
RESPIRATION RATE: 20 BRPM | HEART RATE: 65 BPM | BODY MASS INDEX: 32.64 KG/M2 | DIASTOLIC BLOOD PRESSURE: 64 MMHG | HEIGHT: 70 IN | OXYGEN SATURATION: 97 % | SYSTOLIC BLOOD PRESSURE: 122 MMHG | WEIGHT: 228 LBS

## 2024-12-10 DIAGNOSIS — Z95.0 PACEMAKER: ICD-10-CM

## 2024-12-10 DIAGNOSIS — Z95.0 PACEMAKER: Primary | ICD-10-CM

## 2024-12-10 DIAGNOSIS — I49.8 ATRIAL DYSRHYTHMIA: ICD-10-CM

## 2024-12-10 PROCEDURE — G0463 HOSPITAL OUTPT CLINIC VISIT: HCPCS

## 2024-12-10 PROCEDURE — 93280 PM DEVICE PROGR EVAL DUAL: CPT | Performed by: INTERNAL MEDICINE

## 2024-12-10 ASSESSMENT — PAIN SCALES - GENERAL: PAINLEVEL_OUTOF10: NO PAIN (0)

## 2024-12-10 NOTE — PATIENT INSTRUCTIONS
It was a pleasure to see you in clinic today.  Please do not hesitate to call with any questions or concerns.      Rachel Horne, RN, MS, CCRN  Electrophysiology Nurse Clinician  [facility]    During Business Hours Please Call:  [phone]  After Hours Please Call:  [phone] - select option #[phone] and ask for job code [phone]

## 2024-12-10 NOTE — PATIENT INSTRUCTIONS
Thank you for allowing Dr Feldman and our  team to participate in your care. Please call our office at [phone] with scheduling questions or if you need to cancel or change your appointment. With any other questions or concerns you may call cardiology nurse at  [phone].       If you experience chest pain, chest pressure, chest tightness, shortness of breath, fainting, lightheadedness, nausea, vomiting, or other concerning symptoms, please report to the Emergency Department or call 911. These symptoms may be emergent, and best treated in the Emergency Department.

## 2024-12-10 NOTE — PROGRESS NOTES
[facility]      Referring provider:Nelly Mirza MD       HPI: Mr. Dann Espinoza is a 79 year old  male with PMH significant for    -Syncope secondary to sick sinus syndrome and symptomatic sinus pauses up to 7.5 seconds now status post pacemaker on 7/17/2024  -Brief episodes of paroxysmal atrial arrhythmia detected by pacemaker    Patient underwent pacemaker implantation after he was found to have syncope secondary to sinus pauses.  Since the pacemaker implantation he has not had any syncopal episode.  Patient tells me that he felt dizzy somewhat end of last month.  Pacemaker interrogation apparently paroxysmal A-fib on November 7 for 2-hour 48 minutes.  His dizzy episode does not seem to correlate to any cardiac arrhythmia per patient report.  Unfortunately we are not able to see that part of interrogation today.  Patient was started on Eliquis.  He is referred to cardiology for new onset atrial arrhythmia detected by pacemaker.  No prior history of A-fib.    Medications, personal, family, and social history reviewed with patient and revised.    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      allopurinol (ZYLOPRIM) 300 MG tablet Take 1 tablet by mouth once daily 90 tablet 3    apixaban ANTICOAGULANT (ELIQUIS) 5 MG tablet Take 1 tablet (5 mg) by mouth 2 times daily. 60 tablet 2    aspirin (ASA) 81 MG EC tablet Take 1 tablet by mouth daily.      ATORVASTATIN CALCIUM PO Take 40 mg by mouth daily       HYDROcodone-acetaminophen (NORCO) 5-325 MG tablet Take 1 tablet by mouth 2 times daily as needed for severe pain. 10 tablet 0    INDOMETHACIN PO Take by mouth as needed for other (Gout Flare).      sildenafil (VIAGRA) 50 MG tablet Take 50mg (1 tablet) 1/2 hr to 4 hrs before sexual activity. May take one additional tablet for a total of 100mg in a 24hr period if needed. - do not take with nitroglycerin, it can cause a dangerous drop in blood pressure. 4 tablet 0    tamsulosin (FLOMAX) 0.4 MG capsule Take 0.4 mg by mouth 2 times daily         PAST SURGICAL HISTORY:  Past Surgical History:   Procedure Laterality Date    COLONOSCOPY  07-    Rectal Bleeding > Repeat 3 years (2011)    excision of skin cancer      HEMORRHOIDECTOMY      IR CONSULTATION FOR IR EXAM  11/3/2023    ZZC TOTAL HIP ARTHROPLASTY Left 04/15/2019    [facility]       ALLERGIES:     Allergies   Allergen Reactions    Azithromycin Rash     He developed a rash when on both Azithromycin and Ceftriaxone- unsure which one may have been the culprit      Ceftriaxone Rash     He developed a rash when he was on Ceftriaxone + Azithromycin    Levaquin [Levofloxacin] Rash       FAMILY HISTORY:  Family History   Problem Relation Age of Onset    Crohn's Disease Father     Cancer Father 86        Liver - Cause of Death    Heart Disease Father     Heart Disease Brother     Diabetes Brother 49        Cause of Death    Cerebrovascular Disease Mother         CVA    Other - See Comments Mother         AAA    Heart Disease Mother     Heart Disease Sister          SOCIAL HISTORY:  Social History     Tobacco Use    Smoking status: Former    Smokeless tobacco: Never    Tobacco comments:     Quit 50 years ago (2022)   Vaping Use    Vaping status: Never Used   Substance Use Topics    Alcohol use: No    Drug use: No       ROS:   Constitutional: No fever, chills, or sweats. Weight stable.   Cardiovascular: As per HPI.       Exam:  /64   Pulse 65   Resp 20   Ht  "1.778 m (5' 10\")   Wt 103.4 kg (228 lb)   SpO2 97%   BMI 32.71 kg/m    GENERAL APPEARANCE: alert and no distress  HEENT: no icterus, no central cyanosis  LYMPH/NECK: no adenopathy, no asymmetry, JVP not elevated  CARDIOVASCULAR: regular rhythm, normal S1, S2, no S3 or S4 and no murmur, click or rub, precordium quiet with normal PMI.  GI: soft, non tender  EXTREMITIES: no edema  NEURO: alert, normal speech,and affect  SKIN: no ecchymoses, no rashes     I have reviewed the labs and personally reviewed the imaging below and made my comment in the assessment and plan.    Labs:  CBC RESULTS:   Lab Results   Component Value Date    WBC 6.6 11/26/2024    WBC 6.3 04/11/2019    RBC 4.79 11/26/2024    RBC 4.68 04/11/2019    HGB 14.4 11/26/2024    HGB 14.2 04/11/2019    HCT 42.5 11/26/2024    HCT 40.6 04/11/2019    MCV 89 11/26/2024    MCV 87 04/11/2019    MCH 30.1 11/26/2024    MCH 30.3 04/11/2019    MCHC 33.9 11/26/2024    MCHC 35.0 04/11/2019    RDW 14.6 11/26/2024    RDW 14.1 04/11/2019     11/26/2024     04/11/2019       BMP RESULTS:  Lab Results   Component Value Date     11/26/2024     04/11/2019    POTASSIUM 4.5 11/26/2024    POTASSIUM 3.9 04/11/2019    CHLORIDE 102 11/26/2024    CHLORIDE 107 04/11/2019    CO2 27 11/26/2024    CO2 28 04/11/2019    ANIONGAP 9 11/26/2024    ANIONGAP 5 04/11/2019     (H) 11/26/2024     (H) 04/11/2019    BUN 17.4 11/26/2024    BUN 18 04/11/2019    CR 1.13 11/26/2024    CR 0.96 04/11/2019    GFRESTIMATED 66 11/26/2024    GFRESTIMATED 77 04/11/2019    GFRESTBLACK 90 04/11/2019    CECE 10.0 11/26/2024    CECE 9.5 04/11/2019        Echo: 6/14/2024   Interpretation Summary  No pericardial effusion is present.  Global and regional left ventricular function is normal with an EF of 55-60%.  Left ventricular diastolic function is normal.  Global right ventricular function is normal.  Trace mitral insufficiency is present.  Trace aortic insufficiency is " present.  No aortic stenosis is present.     Ziopatch:   6/14-6/24/24: sinus  bpm, avg 68 bpm. 283 pauses longest lasting 7.5 sec.       Bilateral Carotid US 6/14/2024   FINDINGS: There is mild atherosclerotic plaquing at both carotid  bifurcations. There are no hemodynamically significant stenoses.  Forward flow was demonstrated in the vertebral arteries.                                                                   IMPRESSION: No hemodynamically significant stenoses are identified at  the carotid bifurcations.    EP procedure [facility] 7/17/2024: Dual-chamber pacemaker implantation.    Assessment and Plan:     # Sick sinus syndrome status post pacemaker 7/17/2024  # Atrial arrhythmia detected by pacemaker (patient started on Eliquis)  -We had pacemaker interrogation today.  We were not able to interrogate the November 7 episode that he was told to have A-fib.  On December 5 he had sustained atrial arrhythmia.  After reviewing intracardiac tracings I am not convinced that he has A-fib.  It might be atrial tachycardia.  Per guidelines and multicenter studies patients do not benefit from oral anticoagulation unless the pacemaker detected arrhythmia is clinically documented and/or more than for over 24 hours.  I recommended Mobile telemetry for a month to document the atrial arrhythmia that he has.  We will continue with Eliquis for now.  If he has no documented A-fib but short atrial tachycardia then I will take him off of Eliquis and continue with aspirin.  Patient is agreeable.  He would like to keep his pacemaker follow-up with [facility].    Return to clinic as needed for now.    Total time spent today for this visit is 37 minutes including precharting, face-to-face clinic visit, review of labs/imaging and medical documentation.    Lorena LANDEROS MD  [facility] Division of Cardiology  Pager [phone]

## 2024-12-11 LAB
MDC_IDC_LEAD_CONNECTION_STATUS: NORMAL
MDC_IDC_LEAD_CONNECTION_STATUS: NORMAL
MDC_IDC_LEAD_IMPLANT_DT: NORMAL
MDC_IDC_LEAD_IMPLANT_DT: NORMAL
MDC_IDC_LEAD_LOCATION: NORMAL
MDC_IDC_LEAD_LOCATION: NORMAL
MDC_IDC_LEAD_LOCATION_DETAIL_1: NORMAL
MDC_IDC_LEAD_LOCATION_DETAIL_1: NORMAL
MDC_IDC_LEAD_MFG: NORMAL
MDC_IDC_LEAD_MFG: NORMAL
MDC_IDC_LEAD_MODEL: NORMAL
MDC_IDC_LEAD_MODEL: NORMAL
MDC_IDC_LEAD_POLARITY_TYPE: NORMAL
MDC_IDC_LEAD_POLARITY_TYPE: NORMAL
MDC_IDC_LEAD_SERIAL: NORMAL
MDC_IDC_LEAD_SERIAL: NORMAL
MDC_IDC_MSMT_BATTERY_REMAINING_LONGEVITY: 132 MO
MDC_IDC_MSMT_BATTERY_REMAINING_PERCENTAGE: 100 %
MDC_IDC_MSMT_BATTERY_STATUS: NORMAL
MDC_IDC_MSMT_LEADCHNL_RA_IMPEDANCE_VALUE: 690 OHM
MDC_IDC_MSMT_LEADCHNL_RA_PACING_THRESHOLD_AMPLITUDE: 0.4 V
MDC_IDC_MSMT_LEADCHNL_RA_PACING_THRESHOLD_PULSEWIDTH: 0.4 MS
MDC_IDC_MSMT_LEADCHNL_RA_SENSING_INTR_AMPL: 5.9 MV
MDC_IDC_MSMT_LEADCHNL_RV_IMPEDANCE_VALUE: 720 OHM
MDC_IDC_MSMT_LEADCHNL_RV_PACING_THRESHOLD_AMPLITUDE: 0.7 V
MDC_IDC_MSMT_LEADCHNL_RV_PACING_THRESHOLD_PULSEWIDTH: 0.4 MS
MDC_IDC_MSMT_LEADCHNL_RV_SENSING_INTR_AMPL: 25 MV
MDC_IDC_PG_IMPLANT_DTM: NORMAL
MDC_IDC_PG_MFG: NORMAL
MDC_IDC_PG_MODEL: NORMAL
MDC_IDC_PG_SERIAL: NORMAL
MDC_IDC_PG_TYPE: NORMAL
MDC_IDC_SESS_CLINIC_NAME: NORMAL
MDC_IDC_SESS_DTM: NORMAL
MDC_IDC_SESS_TYPE: NORMAL
MDC_IDC_SET_BRADY_AT_MODE_SWITCH_MODE: NORMAL
MDC_IDC_SET_BRADY_AT_MODE_SWITCH_RATE: 170 {BEATS}/MIN
MDC_IDC_SET_BRADY_LOWRATE: 50 {BEATS}/MIN
MDC_IDC_SET_BRADY_MAX_SENSOR_RATE: 130 {BEATS}/MIN
MDC_IDC_SET_BRADY_MAX_TRACKING_RATE: 130 {BEATS}/MIN
MDC_IDC_SET_BRADY_MODE: NORMAL
MDC_IDC_SET_BRADY_PAV_DELAY_HIGH: 150 MS
MDC_IDC_SET_BRADY_PAV_DELAY_LOW: 150 MS
MDC_IDC_SET_BRADY_SAV_DELAY_HIGH: 150 MS
MDC_IDC_SET_BRADY_SAV_DELAY_LOW: 150 MS
MDC_IDC_SET_LEADCHNL_RA_PACING_AMPLITUDE: 2 V
MDC_IDC_SET_LEADCHNL_RA_PACING_CAPTURE_MODE: NORMAL
MDC_IDC_SET_LEADCHNL_RA_PACING_POLARITY: NORMAL
MDC_IDC_SET_LEADCHNL_RA_PACING_PULSEWIDTH: 0.4 MS
MDC_IDC_SET_LEADCHNL_RA_SENSING_ADAPTATION_MODE: NORMAL
MDC_IDC_SET_LEADCHNL_RA_SENSING_POLARITY: NORMAL
MDC_IDC_SET_LEADCHNL_RA_SENSING_SENSITIVITY: 0.25 MV
MDC_IDC_SET_LEADCHNL_RV_PACING_AMPLITUDE: 1.2 V
MDC_IDC_SET_LEADCHNL_RV_PACING_CAPTURE_MODE: NORMAL
MDC_IDC_SET_LEADCHNL_RV_PACING_POLARITY: NORMAL
MDC_IDC_SET_LEADCHNL_RV_PACING_PULSEWIDTH: 0.4 MS
MDC_IDC_SET_LEADCHNL_RV_SENSING_ADAPTATION_MODE: NORMAL
MDC_IDC_SET_LEADCHNL_RV_SENSING_POLARITY: NORMAL
MDC_IDC_SET_LEADCHNL_RV_SENSING_SENSITIVITY: 1.5 MV
MDC_IDC_SET_ZONE_DETECTION_INTERVAL: 375 MS
MDC_IDC_SET_ZONE_STATUS: NORMAL
MDC_IDC_SET_ZONE_TYPE: NORMAL
MDC_IDC_SET_ZONE_VENDOR_TYPE: NORMAL
MDC_IDC_STAT_AT_BURDEN_PERCENT: 1 %
MDC_IDC_STAT_BRADY_DTM_END: NORMAL
MDC_IDC_STAT_BRADY_DTM_START: NORMAL
MDC_IDC_STAT_BRADY_RA_PERCENT_PACED: 9 %
MDC_IDC_STAT_BRADY_RV_PERCENT_PACED: 1 %
MDC_IDC_STAT_EPISODE_RECENT_COUNT: 4
MDC_IDC_STAT_EPISODE_RECENT_COUNT: 73
MDC_IDC_STAT_EPISODE_RECENT_COUNT: 98
MDC_IDC_STAT_EPISODE_RECENT_COUNT_DTM_END: NORMAL
MDC_IDC_STAT_EPISODE_RECENT_COUNT_DTM_START: NORMAL
MDC_IDC_STAT_EPISODE_TOTAL_COUNT: 73
MDC_IDC_STAT_EPISODE_TOTAL_COUNT_DTM_END: NORMAL
MDC_IDC_STAT_EPISODE_TYPE: NORMAL
MDC_IDC_STAT_EPISODE_VENDOR_TYPE: NORMAL

## 2024-12-13 ENCOUNTER — TELEPHONE (OUTPATIENT)
Dept: CARE COORDINATION | Facility: OTHER | Age: 79
End: 2024-12-13

## 2024-12-13 NOTE — TELEPHONE ENCOUNTER
Return call to Cristina.  Spoke with representative Renee, she stated that Dr. Feldman's patient had an auto triggered event from his device. The patient's device showing new on-set of afib with a HR of 181 BPM @ 6:41 pm om 12/12/24.  She stated that the patient did not report any sx.

## 2024-12-13 NOTE — TELEPHONE ENCOUNTER
I reviewed ECG strips     shows A-fib with RVR.  Patient denied symptoms.  Given the rapid rate I will start patient on metoprolol 50 mg daily.  He should continue Eliquis lifelong.  Recommend follow-up in cardiology in 6 months.    Dr CAN

## 2024-12-13 NOTE — TELEPHONE ENCOUNTER
Marya fountain [facility] called for this patient of Dr. Feldman's stating that they had an auto-triggered event from his device, she requested a return call.

## 2024-12-16 ENCOUNTER — TELEPHONE (OUTPATIENT)
Dept: CARE COORDINATION | Facility: OTHER | Age: 79
End: 2024-12-16

## 2024-12-16 NOTE — TELEPHONE ENCOUNTER
Patient returned call.  After verification, message from provider relayed.  Pt verbalizes understanding and has no further questions or concerns.   Follow up appt scheduled for 5/13/24 with Dr. Feldman.

## 2024-12-16 NOTE — TELEPHONE ENCOUNTER
Telephone call to patient.  No answer.  Left message, with phone number,  requesting return call to cardiology CC.

## 2025-01-16 ENCOUNTER — TELEPHONE (OUTPATIENT)
Dept: FAMILY MEDICINE | Facility: OTHER | Age: 80
End: 2025-01-16

## 2025-01-16 ENCOUNTER — HOSPITAL ENCOUNTER (EMERGENCY)
Facility: HOSPITAL | Age: 80
Discharge: HOME | End: 2025-01-16
Attending: STUDENT IN AN ORGANIZED HEALTH CARE EDUCATION/TRAINING PROGRAM
Payer: COMMERCIAL

## 2025-01-16 VITALS
OXYGEN SATURATION: 93 % | TEMPERATURE: 97.5 F | SYSTOLIC BLOOD PRESSURE: 137 MMHG | HEART RATE: 55 BPM | DIASTOLIC BLOOD PRESSURE: 86 MMHG | RESPIRATION RATE: 16 BRPM

## 2025-01-16 DIAGNOSIS — R42 VERTIGO: ICD-10-CM

## 2025-01-16 LAB
ATRIAL RATE - MUSE: 65 BPM
BASOPHILS # BLD AUTO: 0 10E3/UL (ref 0–0.2)
BASOPHILS NFR BLD AUTO: 1 %
DIASTOLIC BLOOD PRESSURE - MUSE: NORMAL MMHG
EOSINOPHIL # BLD AUTO: 0.2 10E3/UL (ref 0–0.7)
EOSINOPHIL NFR BLD AUTO: 3 %
ERYTHROCYTE [DISTWIDTH] IN BLOOD BY AUTOMATED COUNT: 13.6 % (ref 10–15)
GLUCOSE BLDC GLUCOMTR-MCNC: 97 MG/DL (ref 70–99)
HCT VFR BLD AUTO: 42.4 % (ref 40–53)
HGB BLD-MCNC: 14.9 G/DL (ref 13.3–17.7)
HOLD SPECIMEN: NORMAL
IMM GRANULOCYTES # BLD: 0 10E3/UL
IMM GRANULOCYTES NFR BLD: 0 %
INTERPRETATION ECG - MUSE: NORMAL
LACTATE SERPL-SCNC: 0.9 MMOL/L (ref 0.7–2)
LYMPHOCYTES # BLD AUTO: 1.1 10E3/UL (ref 0.8–5.3)
LYMPHOCYTES NFR BLD AUTO: 19 %
MCH RBC QN AUTO: 30.7 PG (ref 26.5–33)
MCHC RBC AUTO-ENTMCNC: 35.1 G/DL (ref 31.5–36.5)
MCV RBC AUTO: 87 FL (ref 78–100)
MONOCYTES # BLD AUTO: 0.5 10E3/UL (ref 0–1.3)
MONOCYTES NFR BLD AUTO: 9 %
NEUTROPHILS # BLD AUTO: 4 10E3/UL (ref 1.6–8.3)
NEUTROPHILS NFR BLD AUTO: 69 %
NRBC # BLD AUTO: 0 10E3/UL
NRBC BLD AUTO-RTO: 0 /100
P AXIS - MUSE: 58 DEGREES
PLATELET # BLD AUTO: 222 10E3/UL (ref 150–450)
PR INTERVAL - MUSE: 170 MS
QRS DURATION - MUSE: 98 MS
QT - MUSE: 410 MS
QTC - MUSE: 426 MS
R AXIS - MUSE: 20 DEGREES
RBC # BLD AUTO: 4.86 10E6/UL (ref 4.4–5.9)
SYSTOLIC BLOOD PRESSURE - MUSE: NORMAL MMHG
T AXIS - MUSE: 49 DEGREES
VENTRICULAR RATE- MUSE: 65 BPM
WBC # BLD AUTO: 5.8 10E3/UL (ref 4–11)

## 2025-01-16 PROCEDURE — 250N000013 HC RX MED GY IP 250 OP 250 PS 637: Performed by: STUDENT IN AN ORGANIZED HEALTH CARE EDUCATION/TRAINING PROGRAM

## 2025-01-16 PROCEDURE — 99284 EMERGENCY DEPT VISIT MOD MDM: CPT

## 2025-01-16 PROCEDURE — 83605 ASSAY OF LACTIC ACID: CPT | Performed by: STUDENT IN AN ORGANIZED HEALTH CARE EDUCATION/TRAINING PROGRAM

## 2025-01-16 PROCEDURE — 36415 COLL VENOUS BLD VENIPUNCTURE: CPT | Performed by: STUDENT IN AN ORGANIZED HEALTH CARE EDUCATION/TRAINING PROGRAM

## 2025-01-16 PROCEDURE — 93005 ELECTROCARDIOGRAM TRACING: CPT

## 2025-01-16 PROCEDURE — 85025 COMPLETE CBC W/AUTO DIFF WBC: CPT | Performed by: STUDENT IN AN ORGANIZED HEALTH CARE EDUCATION/TRAINING PROGRAM

## 2025-01-16 PROCEDURE — 93010 ELECTROCARDIOGRAM REPORT: CPT | Performed by: INTERNAL MEDICINE

## 2025-01-16 PROCEDURE — 82962 GLUCOSE BLOOD TEST: CPT

## 2025-01-16 PROCEDURE — 99284 EMERGENCY DEPT VISIT MOD MDM: CPT | Performed by: STUDENT IN AN ORGANIZED HEALTH CARE EDUCATION/TRAINING PROGRAM

## 2025-01-16 RX ORDER — MECLIZINE HYDROCHLORIDE 25 MG/1
25 TABLET ORAL ONCE
Status: COMPLETED | OUTPATIENT
Start: 2025-01-16 | End: 2025-01-16

## 2025-01-16 RX ORDER — MECLIZINE HYDROCHLORIDE 25 MG/1
25 TABLET ORAL 3 TIMES DAILY PRN
Qty: 20 TABLET | Refills: 0 | Status: SHIPPED | OUTPATIENT
Start: 2025-01-16

## 2025-01-16 RX ADMIN — MECLIZINE HYDROCHLORIDE 25 MG: 25 TABLET ORAL at 13:17

## 2025-01-16 ASSESSMENT — ACTIVITIES OF DAILY LIVING (ADL): ADLS_ACUITY_SCORE: 42

## 2025-01-16 ASSESSMENT — COLUMBIA-SUICIDE SEVERITY RATING SCALE - C-SSRS
6. HAVE YOU EVER DONE ANYTHING, STARTED TO DO ANYTHING, OR PREPARED TO DO ANYTHING TO END YOUR LIFE?: NO
2. HAVE YOU ACTUALLY HAD ANY THOUGHTS OF KILLING YOURSELF IN THE PAST MONTH?: NO
1. IN THE PAST MONTH, HAVE YOU WISHED YOU WERE DEAD OR WISHED YOU COULD GO TO SLEEP AND NOT WAKE UP?: NO

## 2025-01-16 NOTE — PROGRESS NOTES
RN CC received message from patient looking to see if he can see PCP. States dizzy last 24 hours and might need medication adjusted per device clinic. States pulse yesterday 49-52 and his normal is around 62. States blood pressure 116/66.  RN CC called patient and talked to him.  Patient states 24 hours of dizziness.  States he started Metoprolol around 12/13 and takes around 6 am.  States that he called device clinic yesterday as he was dizzy.  States they assessed and said heart wasn't racing and maybe due to Metoprolol start. States he doesn't typically monitor pulse and blood pressure but due to symptoms he did.  Reports yesterday pulse 49-52 and blood pressure 116/66.  States that he has been drinking enough water.  No shortness of breath over his baseline.  States feet get a little swollen after being on them all day and puts up and swelling goes down.  States he has had no congestion or cold like symptoms and otherwise is feeling fine.  Patient reports this morning that he didn't take his Metoprolol at 6 am.  States that pulse has been around 62 today which he feels is more his normal range.  States that he has been less dizzy today.  States only getting the dizziness slightly when he gets up and is cautious during this time.  States blood pressure has been 132/71 today.    ARD CASSIDY talked with Alyssa, Manager and Cardio not able to see patient.  RN CC talked with PCP and she reviewed and not able to see patient.  She would like patient to be seen in ER today as no covering provider.  RN CC called patient and talked with him and he agrees with plan and will have his sister bring him to ER.  Note to Huc to please call patient to ginny Jamil NP office visit. Patient agrees with plan.  Patient has several questions regarding his cardiac conditions.  Afib or not and Eliquis or not.  He is currently taking but states he hasn't heard from anyone either way.  RN CC can see message from [facility]fer Cardio attempt  to call patient and left message. Will copy RAD Schafer CC to see if she would be able to reach out to patient again.  Jojo Kim RN  Care Coordination

## 2025-01-16 NOTE — ED TRIAGE NOTES
Pt presents with having dizziness since yesterday morning. Called the pacemaker clinic and lee and ran a check and everything was fine.  Thinking it maybe his meds metoprolol was placed on it last month. They thought he was having runs of afib, wore a monitor but hasn't gotten the results-he reports the provider has the report. Has hx of being dizziness  he reports he didn't take the meds today due to it be in the 40's

## 2025-01-16 NOTE — DISCHARGE INSTRUCTIONS
You appear to have vertigo.  The faster you you move your head the worst your symptoms are going to be.  I want you to take every precaution to make sure you do not fall.  Be reminded you are on blood thinners and falls can be dangerous for you.  Move slowly and take your time when you get up.  Use the meclizine to suppress your symptoms.  You can try Epley maneuvers at home.  You look like most of your symptoms were coming from your left ear so you should be doing the maneuver on your right side.  Follow-up with physical therapy for any ongoing symptoms if you are not able to fix it all yourself.  I have given you a referral.  If you are able to fix all the symptoms yourself you do not have to follow-up with physical therapy.  Please follow-up with your primary care provider in the next 1 to 2 weeks  
verbal instruction/written material

## 2025-01-16 NOTE — ED NOTES
Patient was slightly dizzy from lying, sitting, to standing but became more dizzy with lying back down.     In with provider to do maneuver for vertigo.

## 2025-01-16 NOTE — ED PROVIDER NOTES
[facility]  ED Provider Note    Chief Complaint   Patient presents with    Dizziness    Medication Reaction     History:  Dann Espinoza is a 79 year old male with intermittent dizziness that he describes as a spinning sensation that started yesterday.  Is been coming and episodes is triggered by movement.  He always has tinnitus.  He has had longstanding hearing loss.  The constellation of all 3 symptoms however did not all start together.  No other complaint    Review of Systems   Performed; see HPI for pertinent positives and negatives.     Medical history, surgical history, and social history was reviewed.  Nursing documentation, triage note, and vitals were reviewed.    Vitals:  BP: 119/71  Pulse: 55  Temp: 97.5  F (36.4  C)  Resp: 16  SpO2: 97 %    Physical Exam:  Constitutional: Alert and conversant. NAD   HENT: NCAT   Eyes: Normal pupils   Neck: supple   CV: No pallor  Pulmonary/Chest: Non-labored respirations  Abdominal: non-distended   MSK: ROMERO.   Neuro: Alert and appropriate, CN 2-12 intact, Strength 5/5 and symmetric in the upper and lower extremities, SILT, normal rapid alternating movements  Skin: Warm and dry. No diaphoresis. No rashes on exposed skin    Psych: Appropriate mood and affect       MDM:      ED Course as of 01/16/25 1433   Thu Jan 16, 2025   1314 Dann Espinoza is a 79 year old old male presenting with vertigo. Differential includes benign paroxysmal positional vertigo, labyrinthitis, vestibular neuritis, cerebrovascular accident, transient ischemic attack, meniere's, herpes zoster oticus, intracranial hemorrhage, intracranial mass, metabolic abnormality. Given the benign neurologic exam without focal deficits, lack of recent preceding illness, episodic nature of symptoms, and aggravation of symptoms with changes in head positioning, this vertigo likely represents a benign peripheral vertigo such as benign paroxysmal positional vertigo and does not require emergent  imaging to rule out an intracranial process. Patient received meclizine in the ED with improvement in symptoms. He is stable for further outpatient management. Patient given instructions on follow-up and warning signs for which to return to ED. All questions were answered and the patient is comfortable with plan for discharge. The patient was discharged in stable condition with follow up planned for PCP in 1 week, PT prn.        Procedures:  Procedures        Impression:  Final diagnoses:   Vertigo            Rupesh Sandoval MD  01/16/25 2080

## 2025-01-16 NOTE — TELEPHONE ENCOUNTER
Symptom or reason needing to speak to RN: dizzy for 24 hrs /low heart rate    Best number to return call: [phone]     Best time to return call: asap

## 2025-01-30 ENCOUNTER — OFFICE VISIT (OUTPATIENT)
Dept: CARDIOLOGY | Facility: OTHER | Age: 80
End: 2025-01-30
Attending: NURSE PRACTITIONER
Payer: COMMERCIAL

## 2025-01-30 VITALS
RESPIRATION RATE: 16 BRPM | HEART RATE: 77 BPM | HEIGHT: 70 IN | WEIGHT: 228 LBS | OXYGEN SATURATION: 95 % | BODY MASS INDEX: 32.64 KG/M2 | DIASTOLIC BLOOD PRESSURE: 82 MMHG | SYSTOLIC BLOOD PRESSURE: 119 MMHG

## 2025-01-30 DIAGNOSIS — R42 DIZZINESS: Primary | ICD-10-CM

## 2025-01-30 DIAGNOSIS — Z79.01 CHRONIC ANTICOAGULATION: ICD-10-CM

## 2025-01-30 DIAGNOSIS — Z95.0 PACEMAKER: ICD-10-CM

## 2025-01-30 DIAGNOSIS — I49.5 SICK SINUS SYNDROME (H): ICD-10-CM

## 2025-01-30 DIAGNOSIS — I48.0 PAROXYSMAL ATRIAL FIBRILLATION (H): ICD-10-CM

## 2025-01-30 LAB
ATRIAL RATE - MUSE: 69 BPM
DIASTOLIC BLOOD PRESSURE - MUSE: NORMAL MMHG
INTERPRETATION ECG - MUSE: NORMAL
P AXIS - MUSE: 56 DEGREES
PR INTERVAL - MUSE: 180 MS
QRS DURATION - MUSE: 96 MS
QT - MUSE: 394 MS
QTC - MUSE: 422 MS
R AXIS - MUSE: -18 DEGREES
SYSTOLIC BLOOD PRESSURE - MUSE: NORMAL MMHG
T AXIS - MUSE: 44 DEGREES
VENTRICULAR RATE- MUSE: 69 BPM

## 2025-01-30 PROCEDURE — G0463 HOSPITAL OUTPT CLINIC VISIT: HCPCS

## 2025-01-30 ASSESSMENT — PAIN SCALES - GENERAL: PAINLEVEL_OUTOF10: NO PAIN (0)

## 2025-01-30 NOTE — PROGRESS NOTES
"[facility] HEART CARE   CARDIOLOGY PROGRESS NOTE     Chief Complaint   Patient presents with    Consult          Diagnosis:    ICD-10-CM    1. Dizziness  R42       2. Paroxysmal atrial fibrillation (H)  I48.0 EKG 12-lead complete w/read - (Clinic Performed)      3. Chronic anticoagulation  Z79.01       4. Sick sinus syndrome (H) s/p PPM 7/2024 at [facility]  I49.5       5. Pacemaker  Z95.0 EKG 12-lead complete w/read - (Clinic Performed)            Assessment/Plan:    Dizziness  Having heart rates at home in the 40s according to his pulse oximeter. Endorses episodes of lightheadedness with postural changes that resolves in seconds. No near-syncopal events. No syncopal events since pacemaker. He had an episodes of more persistent \"spinning\" sensation 1/16/2025 and was evaluated in the ED. EKG at that visit showed a sinus rhythm with ventricular rate of 65 bpm. He was treated for vertigo and discharged with meclizine. He does admit he gets this spinning sensation with rolling over in bed, quick changes in head position. He has been taking meclizine 25 mg three times daily since ED visit. Recommend he stop regular use of meclizine and use as needed for persistent dizziness.   Orthostatic VS negative in clinic. Some lightheadedness with lying to sitting which resolves quickly.   I do not think this \"spinning\" sensation has a cardiac origin. I also do not think these quick episodes of lightheadedness with postural changes are related to bradycardia.   He did stop his metoprolol succinate 50 mg once daily on 1/16/2025, the day of ED visit, and has not restarted. He was noticing heart rates of 50-51 bpm at rest. Since stopping resting heart rate 60-61 bpm. MCOT results still pending. We will not re-start metoprolol today. We will wait until MCOT results come back. Since your baseline heart rate is around 60-65 and heart rates on metoprolol around 50-51 bpm- if metoprolol is needed for tachy atrial arrhythmia consider " "increasing Ajay Setting Lower Rate Limit from 50 to 60 bpm. Dr. Feldman will review this and these results with you.         Sick sinus syndrome status post pacemaker 7/17/2024  Following with device clinic    Atrial fibrillation   Asymptomatic, Dr. Feldman reviewed strips 12/12/2024 showing atrial fibrillation with RVR  Consider re-starting metoprolol succinate as above pending MCOT results.     CHADs-VASC >=2  Continue chronic oral anticoagulation: Eliquis 5 mg twice daily for stroke prophylaxis with history of paroxysmal atrial fibrillation  No bleeding concerns today    Keep scheduled cardiology appointment, certainly follow-up sooner with acute concerns.       Interval history:  Dann Espinoza is a very pleasant 79-year old male who presents for cardiology follow-up. Mr. Espinoza has previously see Dr. Feldman. Recall, he has a cardiovascular history including paroxysmal atrial fibrillation, sick sinus syndrome s/p PPM 7/20/2024,  hyperlipidemia. He has a non-cardiac medical history including gout, BPH, ED.    Today, Mr. Espinoza endorses dizziness \"for years when I would stand up and get dizzy. The first time it really got to be a problem was on a Sunday when I was going up the hill in [place]. It didn't feel like when you first get up. It didn't feel good. I passed out momentarily.\" He was told he was having \"pauses\" in his heart. He wore a zio patch 6/2024 showing 283 sinus pauses with the longest lasting 7.5 seconds. This is how he ended up with the pacemaker. He has not had any recurrence of this feeling and has not had any recurrent syncopal events.     \"Sometimes when I sit up first thing in the morning or sit up in the recliner really quick. It doesn't last long but there was one time where I sat up on the edge of the bed and almost fell off.\" EKG showed a sinus rhythm with a ventricular rate of 65 bpm. He went into the ED for this on 1/16/2025 and was diagnosed with vertigo. He has been doing exercises for vertigo a " "couple times daily since as well taking meclizine 25 mg three times daily.  1/16/2025- constant \"spinning sensation. The room was just spinning.\" He has not taken metoprolol succinate since this. When he was taking metoprolol succinate his heart rates at home were 50-51 bpm. Since stopping the metoprolol succinate his home heart rates have been in the 60s.    This last week he shoveled his front/back walk and the slab behind the garage. He started to feel funny so he went inside and checked his blood pressure/heart rate- initially blood pressure was 94/55, HR was 50 bpm, then rechecked it about 10 minutes later and it was 101 systolic, HR was around 60. Then 10 minutes later Bp was 122/66 and heart rate was 61 bpm.   Denies concerns for LE edema- occasionally will swell up during the day and resolves by morning  No chest pain or chest pressure.   No dyspnea, sometimes has some dyspnea on exertion but only with heavier exertion such as shoveling.   No sensation of racing, skipping, fluttering in his chest.   No episodes of near-syncope or syncopal events.       Lifestyle: Lives alone-  1 year ago February 11th. His son omaira lives about 1 block away. He eats breakfast out at local diner every morning- 1 egg/half order american fries/ 2 sausage/wheat toast/coffee; sometimes two Malay toast/2 sausage; skips lunch most days of the week; sometimes skips dinner. Doesn't really snack throughout the day- maybe a banana or orange, maybe a couple club crackers.   He has a 1 pint jar he drinks water out of- usually drinks 8 pints of water throughout the day.         HPI:    Mr. Dann Espinoza is a 79 year old  male with PMH significant for    -Syncope secondary to sick sinus syndrome and symptomatic sinus pauses up to 7.5 seconds now status post pacemaker on 7/17/2024  -Brief episodes of paroxysmal atrial arrhythmia detected by pacemaker    Patient underwent pacemaker implantation after he was found to have syncope " secondary to sinus pauses.  Since the pacemaker implantation he has not had any syncopal episode.  Patient tells me that he felt dizzy somewhat end of last month.  Pacemaker interrogation apparently paroxysmal A-fib on November 7 for 2-hour 48 minutes.  His dizzy episode does not seem to correlate to any cardiac arrhythmia per patient report.  Unfortunately we are not able to see that part of interrogation today.  Patient was started on Eliquis.  He is referred to cardiology for new onset atrial arrhythmia detected by pacemaker.  No prior history of A-fib.    Medications, personal, family, and social history reviewed with patient and revised.      RELEVANT TESTING:  Echo: 6/14/2024   Interpretation Summary  No pericardial effusion is present.  Global and regional left ventricular function is normal with an EF of 55-60%.  Left ventricular diastolic function is normal.  Global right ventricular function is normal.  Trace mitral insufficiency is present.  Trace aortic insufficiency is present.  No aortic stenosis is present.     Ziopatch:   6/14-6/24/24: sinus  bpm, avg 68 bpm. 283 pauses longest lasting 7.5 sec.       Bilateral Carotid US 6/14/2024   FINDINGS: There is mild atherosclerotic plaquing at both carotid  bifurcations. There are no hemodynamically significant stenoses.  Forward flow was demonstrated in the vertebral arteries.                                                                   IMPRESSION: No hemodynamically significant stenoses are identified at  the carotid bifurcations.        Past Medical History:   Diagnosis Date    Asbestosis(501) 01/12/2011    Gout, unspecified 01/17/2003    Lumbago 01/12/2011    4 crushed lumbar    Other and unspecified hyperlipidemia 01/12/2011    Psychosexual dysfunction, unspecified 01/12/2011       Past Surgical History:   Procedure Laterality Date    COLONOSCOPY  07-    Rectal Bleeding > Repeat 3 years (2011)    excision of skin cancer       HEMORRHOIDECTOMY      IR CONSULTATION FOR IR EXAM  11/3/2023    ZZC TOTAL HIP ARTHROPLASTY Left 04/15/2019    [facility]       Allergies   Allergen Reactions    Azithromycin Rash     He developed a rash when on both Azithromycin and Ceftriaxone- unsure which one may have been the culprit      Ceftriaxone Rash     He developed a rash when he was on Ceftriaxone + Azithromycin    Levaquin [Levofloxacin] Rash       Current Outpatient Medications   Medication Sig Dispense Refill    acetaminophen (TYLENOL) 500 MG tablet Take 1,000 mg by mouth every 6 hours as needed.      allopurinol (ZYLOPRIM) 300 MG tablet Take 1 tablet by mouth once daily 90 tablet 3    apixaban ANTICOAGULANT (ELIQUIS) 5 MG tablet Take 1 tablet (5 mg) by mouth 2 times daily. 60 tablet 2    ATORVASTATIN CALCIUM PO Take 40 mg by mouth daily      INDOMETHACIN PO Take by mouth as needed for other (Gout Flare).      meclizine (ANTIVERT) 25 MG tablet Take 1 tablet (25 mg) by mouth 3 times daily as needed for dizziness. 20 tablet 0    sildenafil (VIAGRA) 50 MG tablet Take 50mg (1 tablet) 1/2 hr to 4 hrs before sexual activity. May take one additional tablet for a total of 100mg in a 24hr period if needed. - do not take with nitroglycerin, it can cause a dangerous drop in blood pressure. 4 tablet 0    tamsulosin (FLOMAX) 0.4 MG capsule Take 0.4 mg by mouth 2 times daily      aspirin (ASA) 81 MG EC tablet Take 1 tablet by mouth daily. (Patient not taking: Reported on 12/10/2024)      metoprolol succinate ER (TOPROL XL) 50 MG 24 hr tablet Take 1 tablet (50 mg) by mouth daily. (Patient not taking: Reported on 1/30/2025) 90 tablet 3       Social History     Socioeconomic History    Marital status:      Spouse name: Not on file    Number of children: Not on file    Years of education: Not on file    Highest education level: Not on file   Occupational History    Occupation: RETIRED     Employer: [employer]   Tobacco Use    Smoking status: Former     Smokeless tobacco: Never    Tobacco comments:     Quit 50 years ago (2022)   Vaping Use    Vaping status: Never Used   Substance and Sexual Activity    Alcohol use: No    Drug use: No    Sexual activity: Not on file   Other Topics Concern     Service Not Asked    Blood Transfusions Not Asked    Caffeine Concern Yes     Comment: COFFEE - 5 CUPS DAILY    Occupational Exposure Not Asked    Hobby Hazards Not Asked    Sleep Concern Not Asked    Stress Concern Not Asked    Weight Concern Not Asked    Special Diet Not Asked    Back Care Not Asked    Exercise Not Asked    Bike Helmet Not Asked    Seat Belt Not Asked    Self-Exams Not Asked    Parent/sibling w/ CABG, MI or angioplasty before 65F 55M? Yes     Comment: Dad age 46 with  first heart attack. Brother MI age  55, sister MI at age 64.   Social History Narrative    Not on file     Social Drivers of Health     Financial Resource Strain: Low Risk  (8/9/2024)    Financial Resource Strain     Within the past 12 months, have you or your family members you live with been unable to get utilities (heat, electricity) when it was really needed?: No   Food Insecurity: Low Risk  (8/9/2024)    Food Insecurity     Within the past 12 months, did you worry that your food would run out before you got money to buy more?: No     Within the past 12 months, did the food you bought just not last and you didn t have money to get more?: No   Transportation Needs: Low Risk  (8/9/2024)    Transportation Needs     Within the past 12 months, has lack of transportation kept you from medical appointments, getting your medicines, non-medical meetings or appointments, work, or from getting things that you need?: No   Physical Activity: Unknown (8/9/2024)    Exercise Vital Sign     Days of Exercise per Week: 2 days     Minutes of Exercise per Session: Not on file   Stress: No Stress Concern Present (8/9/2024)    [employer] - Occupational Stress Questionnaire      "Feeling of Stress : Only a little   Social Connections: Unknown (8/9/2024)    Social Connection and Isolation Panel [NHANES]     Frequency of Communication with Friends and Family: Not on file     Frequency of Social Gatherings with Friends and Family: Three times a week     Attends Muslim Services: Not on file     Active Member of Clubs or Organizations: Not on file     Attends Club or Organization Meetings: Not on file     Marital Status: Not on file   Interpersonal Safety: Low Risk  (8/9/2024)    Interpersonal Safety     Do you feel physically and emotionally safe where you currently live?: Yes     Within the past 12 months, have you been hit, slapped, kicked or otherwise physically hurt by someone?: No     Within the past 12 months, have you been humiliated or emotionally abused in other ways by your partner or ex-partner?: No   Housing Stability: Low Risk  (8/9/2024)    Housing Stability     Do you have housing? : Yes     Are you worried about losing your housing?: No       TEST RESULTS  Results for orders placed or performed in visit on 01/30/25   EKG 12-lead complete w/read - (Clinic Performed)     Status: None (Preliminary result)   Result Value Ref Range    Systolic Blood Pressure  mmHg    Diastolic Blood Pressure  mmHg    Ventricular Rate 69 BPM    Atrial Rate 69 BPM    VT Interval 180 ms    QRS Duration 96 ms     ms    QTc 422 ms    P Axis 56 degrees    R AXIS -18 degrees    T Axis 44 degrees    Interpretation ECG       Sinus rhythm  Normal ECG  When compared with ECG of 16-Jan-2025 12:02,  No significant change was found               Review of systems: Negative except that which was noted in the HPI.    Physical examination:    Vitals: /82   Pulse 77   Resp 16   Ht 1.778 m (5' 10\")   Wt 103.4 kg (228 lb)   SpO2 95%   BMI 32.71 kg/m    BMI= Body mass index is 32.71 kg/m .      GENERAL APPEARANCE: healthy, alert and no distress  CHEST: lungs clear to auscultation - no rales, rhonchi " or wheezes, no use of accessory muscles, no retractions, respirations are unlabored, normal respiratory rate  CARDIOVASCULAR: regular rhythm, normal S1 with physiologic split S2, no S3 or S4 and no murmur, click or rub  EXTREMITIES: no edema  NEURO: alert and oriented normal speech  VASC: No carotid bruits heard.      Thank you for allowing me to participate in the care of your patient. Please do not hesitate to contact me if you have any questions.       Loulou Johnson, CNP

## 2025-01-30 NOTE — PATIENT INSTRUCTIONS
Thank you for allowing Loulou Johnson CNP and our  team to participate in your care. Please call our office at [phone] with scheduling questions or if you need to cancel or change your appointment. With any other questions or concerns you may call cardiology nurse at  [phone].       If you experience chest pain, chest pressure, chest tightness, shortness of breath, fainting, lightheadedness, nausea, vomiting, or other concerning symptoms, please report to the Emergency Department or call 911. These symptoms may be emergent, and best treated in the Emergency Department.    STOP using meclizine regularly. Only uses as needed for those episodes of spinning that don't improve after sitting up. Do not use for those quick, short episodes of lightheadedness that happen when you go from laying to sitting, sitting to standing. Change positions slowly and wait until lightheadedness improves before changing to the next position or starting to take steps to walk    We will not re-start metoprolol today. We will wait until MCOT results come back. Since your baseline heart rate is around 60-65 and heart rates on metoprolol around 50-51 bpm- if metoprolol is needed maybe consider increasing Ajay Setting Lower Rate Limit from 50 to 60 bpm. Dr. Feldman will review this and these results with you.       Keep follow-up with Dr. Feldman scheduled for May, certainly sooner with acute concerns.    Loulou Johnson CNP

## 2025-02-17 ENCOUNTER — TELEPHONE (OUTPATIENT)
Dept: CARDIOLOGY | Facility: OTHER | Age: 80
End: 2025-02-17

## 2025-02-17 NOTE — TELEPHONE ENCOUNTER
After verification, and confirmation of signed consent, pt notified of results per provider. Pt verbalized understanding and had no further questions or concerns.   Reports taking Eliquis BiD without issue.

## 2025-02-25 NOTE — PROGRESS NOTES
Assessment & Plan     Paroxysmal atrial fibrillation (H)  Stable. Recent MCOT showed atrial fib <1% time, started on Eliquis. Follows with Dr. Feldman (EP) and Dr. Loulou Johnson (cardiology)  -c/w Eliquis 5 mg BID  - Hemoglobin A1c; Future  - TSH with free T4 reflex; Future  - Hemoglobin A1c  - TSH with free T4 reflex  - metoprolol has been discontinued d/t bradycardia     Pacemaker d/t SA node dysfunction   Stable. Follows with Dr. Feldman of electrophysiology. Next visit 5/13/2025. Pacemaker minimum rate recently set to 60 bpm.    Dizziness  Recent ED visit diagnosed with vertigo, controlled with meclizine.   Taking on as needed basis.  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 Most likely due to back etiology   Chronic back pain.   Will get lab work today rule B-12 deficiency, thyroid issues, diabetes, or possible myeloma.  Will try biofreeze on the feet for symptomatic relief.    - Menthol, Topical Analgesic, (BIOFREEZE COOL THE PAIN) 4 % GEL; Externally apply topically 4 times daily as needed (foot pain).  - Anti Nuclear Christie IgG by IFA with Reflex; Future  - Comprehensive metabolic panel; Future  - Erythrocyte sedimentation rate auto; Future - normal  - Hemoglobin A1c; Future - wnl  - Protein electrophoresis; Future  - Protein electrophoresis random urine; Future  - Protein Immunofixation Serum; Future  - Protein immunofixation urine; Future  - TSH with free T4 reflex; Future - wnl  - Vitamin B12; Future    Abnormal finding of blood chemistry, unspecified  Given neuropathy and last A1c in 2023 (normal) will recheck today.  - Hemoglobin A1c; Future  - Hemoglobin A1c    The longitudinal plan of care for the diagnosis(es)/condition(s) as documented were addressed during this visit. Due to the added complexity in care, I will continue to support Dann in the subsequent management and  with ongoing continuity of care.        See Patient Instructions    Return if symptoms worsen or fail to improve.    Corky Quigley is a 79 year old, presenting for the following health issues:  Atrial Fib and Hypertension        2/27/2025    10:01 AM   Additional Questions   Roomed by Deanna Cade   Accompanied by self     HPI         # Dizziness / vertigo  - evaluated in ER on 1/16/2025   - stopped his metoprolol d/t bradycardia   -now pacemaker is set to minimum 60 bpm  -was taking meclizine; now only taking as needed  -most recently became dizzy 1 week prior      Hypertension Follow-up    Do you check your blood pressure regularly outside of the clinic? No   Are you following a low salt diet? Yes  Are your blood pressures ever more than 140 on the top number (systolic) OR more   than 90 on the bottom number (diastolic), for example 140/90? No    BP is 118/72 which is around his baseline      Atrial Fibrillation Follow-up    Symptoms: no recent chest pain, significant palpitations, dizziness/lightheadedness, dyspnea, or increased peripheral edema.  Stroke prevention: DOAC (Eliquis, Xarelto, Pradaxa)      - new onset afib (11/2024)   -Follows with Loulou Johnson, cardiology with last visit 1/30/2025. Note reviewed. Stopped metoprolol d/t bradycardia  -Follows with Dr Feldman with next visit 5/13/2025  -MCOT showed atrial fibrillation (<1%) but still at increased risk and started on Eliquis  -cannot tell when he is in a-fib  -Eliquis 5mg BID            1/16/2025     1:10 PM 1/16/2025     1:15 PM 1/16/2025     1:30 PM 1/30/2025     8:13 AM 2/27/2025    10:01 AM   Date   Pulse 61 55 55 77 77     #Hyperlipidemia  -c/w atorvastatin 40 mg    #Tingling in Feet  Started 2 months ago  Occurs everyday   Elevating feet helps, hasn't tried medications  No falls, not feeling off balance  Chronic back pain, unchanged  Open to trying biofreeze    #Gout  -c/w allopurinol daily      ROS:   Constitutional, HEENT,  "cardiovascular, pulmonary, gi and gu systems are negative, except as otherwise noted.       Objective    /72   Pulse 77   Temp 97.9  F (36.6  C) (Tympanic)   Resp 17   Ht 1.778 m (5' 10\")   Wt 104.6 kg (230 lb 8 oz)   SpO2 96%   BMI 33.07 kg/m    Body mass index is 33.07 kg/m .  Physical Exam  Constitutional:       Appearance: Normal appearance.   Eyes:      Extraocular Movements: Extraocular movements intact.      Conjunctiva/sclera: Conjunctivae normal.   Cardiovascular:      Rate and Rhythm: Normal rate and regular rhythm.      Pulses:           Dorsalis pedis pulses are 2+ on the right side and 2+ on the left side.      Heart sounds: Murmur heard.   Pulmonary:      Effort: Pulmonary effort is normal.      Breath sounds: Normal breath sounds.   Abdominal:      Palpations: Abdomen is soft.      Tenderness: There is no abdominal tenderness.   Musculoskeletal:      Right lower leg: No edema.      Left lower leg: No edema.   Feet:      Right foot:      Protective Sensation: 4 sites tested.  4 sites sensed.      Skin integrity: No skin breakdown.      Toenail Condition: Right toenails are long.      Left foot:      Protective Sensation: 4 sites tested.  3 sites sensed.      Skin integrity: No skin breakdown.      Toenail Condition: Left toenails are long.   Neurological:      Mental Status: He is alert.      Gait: Gait normal.      Comments: Slightly wobbly with romberg.   Psychiatric:         Mood and Affect: Mood normal.         Results for orders placed or performed in visit on 02/27/25   Comprehensive metabolic panel     Status: Abnormal   Result Value Ref Range    Sodium 137 135 - 145 mmol/L    Potassium 4.4 3.4 - 5.3 mmol/L    Carbon Dioxide (CO2) 27 22 - 29 mmol/L    Anion Gap 9 7 - 15 mmol/L    Urea Nitrogen 18.8 8.0 - 23.0 mg/dL    Creatinine 1.13 0.67 - 1.17 mg/dL    GFR Estimate 66 >60 mL/min/1.73m2    Calcium 9.7 8.8 - 10.4 mg/dL    Chloride 101 98 - 107 mmol/L    Glucose 104 (H) 70 - 99 mg/dL "    Alkaline Phosphatase 136 40 - 150 U/L    AST 23 0 - 45 U/L    ALT 25 0 - 70 U/L    Protein Total 7.3 6.4 - 8.3 g/dL    Albumin 4.1 3.5 - 5.2 g/dL    Bilirubin Total 0.5 <=1.2 mg/dL   Erythrocyte sedimentation rate auto     Status: Normal   Result Value Ref Range    Erythrocyte Sedimentation Rate 17 0 - 20 mm/hr   Hemoglobin A1c     Status: Normal   Result Value Ref Range    Estimated Average Glucose 111 <117 mg/dL    Hemoglobin A1C 5.5 <5.7 %   TSH with free T4 reflex     Status: Normal   Result Value Ref Range    TSH 2.48 0.30 - 4.20 uIU/mL   Total Protein, Serum for ELP     Status: Normal   Result Value Ref Range    Total Protein Serum for ELP 7.0 6.4 - 8.3 g/dL   Protein electrophoresis     Status: None (In process)    Narrative    The following orders were created for panel order Protein electrophoresis.  Procedure                               Abnormality         Status                     ---------                               -----------         ------                     Total Protein, Serum for...[[id number]]  Normal              Final result               Protein Electrophoresis,...[[id number]]                      In process                   Please view results for these tests on the individual orders.              Note was written by medical student, reviewed and updated by Dr. Mirza.  Renee Paredes, MS4      I was present with the medical school student who participated in the service and in the documentation of the note. I have verified the history and personally performed the physical exam and medical decision making. I agree with the assessment and plan of care as documented in the note.       Signed Electronically by: Nelly Mirza MD

## 2025-02-27 ENCOUNTER — OFFICE VISIT (OUTPATIENT)
Dept: FAMILY MEDICINE | Facility: OTHER | Age: 80
End: 2025-02-27
Attending: FAMILY MEDICINE
Payer: COMMERCIAL

## 2025-02-27 VITALS
RESPIRATION RATE: 17 BRPM | OXYGEN SATURATION: 96 % | HEART RATE: 77 BPM | TEMPERATURE: 97.9 F | HEIGHT: 70 IN | DIASTOLIC BLOOD PRESSURE: 72 MMHG | WEIGHT: 230.5 LBS | BODY MASS INDEX: 33 KG/M2 | SYSTOLIC BLOOD PRESSURE: 118 MMHG

## 2025-02-27 DIAGNOSIS — I48.0 PAROXYSMAL ATRIAL FIBRILLATION (H): Primary | ICD-10-CM

## 2025-02-27 DIAGNOSIS — G62.9 NEUROPATHY: ICD-10-CM

## 2025-02-27 DIAGNOSIS — Z95.0 PACEMAKER: ICD-10-CM

## 2025-02-27 DIAGNOSIS — R42 DIZZINESS: ICD-10-CM

## 2025-02-27 DIAGNOSIS — M54.41 MIDLINE LOW BACK PAIN WITH BILATERAL SCIATICA, UNSPECIFIED CHRONICITY: ICD-10-CM

## 2025-02-27 DIAGNOSIS — R79.9 ABNORMAL FINDING OF BLOOD CHEMISTRY, UNSPECIFIED: ICD-10-CM

## 2025-02-27 DIAGNOSIS — M54.42 MIDLINE LOW BACK PAIN WITH BILATERAL SCIATICA, UNSPECIFIED CHRONICITY: ICD-10-CM

## 2025-02-27 PROBLEM — I48.91 ATRIAL FIBRILLATION (H): Status: ACTIVE | Noted: 2025-02-27

## 2025-02-27 PROBLEM — Z79.01 LONG TERM (CURRENT) USE OF ANTICOAGULANTS: Status: ACTIVE | Noted: 2025-02-27

## 2025-02-27 LAB
ALBUMIN SERPL BCG-MCNC: 4.1 G/DL (ref 3.5–5.2)
ALP SERPL-CCNC: 136 U/L (ref 40–150)
ALT SERPL W P-5'-P-CCNC: 25 U/L (ref 0–70)
ANION GAP SERPL CALCULATED.3IONS-SCNC: 9 MMOL/L (ref 7–15)
AST SERPL W P-5'-P-CCNC: 23 U/L (ref 0–45)
BILIRUB SERPL-MCNC: 0.5 MG/DL
BUN SERPL-MCNC: 18.8 MG/DL (ref 8–23)
CALCIUM SERPL-MCNC: 9.7 MG/DL (ref 8.8–10.4)
CHLORIDE SERPL-SCNC: 101 MMOL/L (ref 98–107)
CREAT SERPL-MCNC: 1.13 MG/DL (ref 0.67–1.17)
EGFRCR SERPLBLD CKD-EPI 2021: 66 ML/MIN/1.73M2
ERYTHROCYTE [SEDIMENTATION RATE] IN BLOOD BY WESTERGREN METHOD: 17 MM/HR (ref 0–20)
EST. AVERAGE GLUCOSE BLD GHB EST-MCNC: 111 MG/DL
GLUCOSE SERPL-MCNC: 104 MG/DL (ref 70–99)
HBA1C MFR BLD: 5.5 %
HCO3 SERPL-SCNC: 27 MMOL/L (ref 22–29)
POTASSIUM SERPL-SCNC: 4.4 MMOL/L (ref 3.4–5.3)
PROT SERPL-MCNC: 7.3 G/DL (ref 6.4–8.3)
SODIUM SERPL-SCNC: 137 MMOL/L (ref 135–145)
TOTAL PROTEIN SERUM FOR ELP: 7 G/DL (ref 6.4–8.3)
TSH SERPL DL<=0.005 MIU/L-ACNC: 2.48 UIU/ML (ref 0.3–4.2)
VIT B12 SERPL-MCNC: 543 PG/ML (ref 232–1245)

## 2025-02-27 PROCEDURE — 83036 HEMOGLOBIN GLYCOSYLATED A1C: CPT | Mod: ZL | Performed by: FAMILY MEDICINE

## 2025-02-27 PROCEDURE — 86334 IMMUNOFIX E-PHORESIS SERUM: CPT | Mod: 26 | Performed by: PATHOLOGY

## 2025-02-27 PROCEDURE — 36415 COLL VENOUS BLD VENIPUNCTURE: CPT | Mod: ZL | Performed by: FAMILY MEDICINE

## 2025-02-27 PROCEDURE — 82607 VITAMIN B-12: CPT | Mod: ZL | Performed by: FAMILY MEDICINE

## 2025-02-27 PROCEDURE — 84443 ASSAY THYROID STIM HORMONE: CPT | Mod: ZL | Performed by: FAMILY MEDICINE

## 2025-02-27 PROCEDURE — 85652 RBC SED RATE AUTOMATED: CPT | Mod: ZL | Performed by: FAMILY MEDICINE

## 2025-02-27 PROCEDURE — G0463 HOSPITAL OUTPT CLINIC VISIT: HCPCS

## 2025-02-27 PROCEDURE — 84165 PROTEIN E-PHORESIS SERUM: CPT | Mod: 26 | Performed by: PATHOLOGY

## 2025-02-27 PROCEDURE — 80053 COMPREHEN METABOLIC PANEL: CPT | Mod: ZL | Performed by: FAMILY MEDICINE

## 2025-02-27 PROCEDURE — 82947 ASSAY GLUCOSE BLOOD QUANT: CPT | Mod: ZL | Performed by: FAMILY MEDICINE

## 2025-02-27 PROCEDURE — 82310 ASSAY OF CALCIUM: CPT | Mod: ZL | Performed by: FAMILY MEDICINE

## 2025-02-27 PROCEDURE — 84155 ASSAY OF PROTEIN SERUM: CPT | Mod: ZL | Performed by: FAMILY MEDICINE

## 2025-02-27 RX ORDER — MENTHOL 40 MG/ML
GEL TOPICAL 4 TIMES DAILY PRN
Qty: 473 ML | Refills: 2 | Status: SHIPPED | OUTPATIENT
Start: 2025-02-27

## 2025-02-27 ASSESSMENT — PAIN SCALES - GENERAL: PAINLEVEL_OUTOF10: NO PAIN (0)

## 2025-02-27 NOTE — PATIENT INSTRUCTIONS
Please keep your follow up appointment with me in August 2025.    We will do some lab work for the tingling in your toes, we will MyChart you with results.     We will send a prescription for Biofreeze to help with the tingling in your feet.

## 2025-03-04 LAB
ANA PAT SER IF-IMP: ABNORMAL
ANA SER QL IF: POSITIVE
ANA TITR SER IF: ABNORMAL {TITER}

## 2025-04-10 ENCOUNTER — TELEPHONE (OUTPATIENT)
Dept: FAMILY MEDICINE | Facility: OTHER | Age: 80
End: 2025-04-10

## 2025-04-10 DIAGNOSIS — N52.9 ERECTILE DYSFUNCTION, UNSPECIFIED ERECTILE DYSFUNCTION TYPE: Primary | ICD-10-CM

## 2025-04-10 RX ORDER — SILDENAFIL 100 MG/1
TABLET, FILM COATED ORAL
Qty: 4 TABLET | Refills: 0 | Status: SHIPPED | OUTPATIENT
Start: 2025-04-10

## 2025-04-10 NOTE — TELEPHONE ENCOUNTER
9:38 AM    Reason for Call: Phone Call    Description: Patient called wondering if Dr. Mirza would be willing to send 4 100mg tablets of Viagra to [facility]? He says he has tried 50mg in the past but it didn't seem [id number] help. He says he is only requesting 4 tablets to see if they will help.     Was an appointment offered for this call? No    Preferred method for responding to this message: Telephone Call  What is your phone number ?[phone]     If we cannot reach you directly, may we leave a detailed response at the number you provided? Yes    Can this message wait until your PCP/provider returns, if available today? YES    Sharla Mckeon

## 2025-08-11 ENCOUNTER — OFFICE VISIT (OUTPATIENT)
Dept: FAMILY MEDICINE | Facility: OTHER | Age: 80
End: 2025-08-11
Attending: FAMILY MEDICINE
Payer: COMMERCIAL

## 2025-08-11 VITALS
HEART RATE: 72 BPM | TEMPERATURE: 97.3 F | DIASTOLIC BLOOD PRESSURE: 74 MMHG | WEIGHT: 225.7 LBS | RESPIRATION RATE: 17 BRPM | BODY MASS INDEX: 32.31 KG/M2 | SYSTOLIC BLOOD PRESSURE: 118 MMHG | HEIGHT: 70 IN | OXYGEN SATURATION: 95 %

## 2025-08-11 DIAGNOSIS — I48.19 PERSISTENT ATRIAL FIBRILLATION (H): ICD-10-CM

## 2025-08-11 DIAGNOSIS — Z00.00 ENCOUNTER FOR MEDICARE ANNUAL WELLNESS EXAM: Primary | ICD-10-CM

## 2025-08-11 DIAGNOSIS — E78.00 PURE HYPERCHOLESTEROLEMIA: ICD-10-CM

## 2025-08-11 DIAGNOSIS — G45.0 VERTEBROBASILAR CIRCULATION TRANSIENT ISCHEMIC ATTACK: ICD-10-CM

## 2025-08-11 DIAGNOSIS — H53.133 SUDDEN VISUAL LOSS, BILATERAL: ICD-10-CM

## 2025-08-11 DIAGNOSIS — Z86.69 HISTORY OF MIGRAINE: ICD-10-CM

## 2025-08-11 DIAGNOSIS — I77.810 AORTIC ROOT DILATION: ICD-10-CM

## 2025-08-11 DIAGNOSIS — Z95.0 PACEMAKER: ICD-10-CM

## 2025-08-11 DIAGNOSIS — H53.30 BINOCULAR VISUAL DISTURBANCE: ICD-10-CM

## 2025-08-11 DIAGNOSIS — R42 DIZZINESS: ICD-10-CM

## 2025-08-11 PROBLEM — R01.1 HEART MURMUR: Status: ACTIVE | Noted: 2025-08-11

## 2025-08-11 LAB
ANION GAP SERPL CALCULATED.3IONS-SCNC: 8 MMOL/L (ref 7–15)
BUN SERPL-MCNC: 16.2 MG/DL (ref 8–23)
CALCIUM SERPL-MCNC: 10 MG/DL (ref 8.8–10.4)
CHLORIDE SERPL-SCNC: 104 MMOL/L (ref 98–107)
CREAT SERPL-MCNC: 1.14 MG/DL (ref 0.67–1.17)
CRP SERPL-MCNC: <3 MG/L
EGFRCR SERPLBLD CKD-EPI 2021: 65 ML/MIN/1.73M2
ERYTHROCYTE [DISTWIDTH] IN BLOOD BY AUTOMATED COUNT: 14 % (ref 10–15)
ERYTHROCYTE [SEDIMENTATION RATE] IN BLOOD BY WESTERGREN METHOD: 18 MM/HR (ref 0–20)
GLUCOSE SERPL-MCNC: 93 MG/DL (ref 70–99)
HCO3 SERPL-SCNC: 25 MMOL/L (ref 22–29)
HCT VFR BLD AUTO: 41.9 % (ref 40–53)
HGB BLD-MCNC: 14.3 G/DL (ref 13.3–17.7)
MCH RBC QN AUTO: 30.4 PG (ref 26.5–33)
MCHC RBC AUTO-ENTMCNC: 34.1 G/DL (ref 31.5–36.5)
MCV RBC AUTO: 89 FL (ref 78–100)
PLATELET # BLD AUTO: 247 10E3/UL (ref 150–450)
POTASSIUM SERPL-SCNC: 4.4 MMOL/L (ref 3.4–5.3)
RBC # BLD AUTO: 4.7 10E6/UL (ref 4.4–5.9)
SODIUM SERPL-SCNC: 137 MMOL/L (ref 135–145)
WBC # BLD AUTO: 5.7 10E3/UL (ref 4–11)

## 2025-08-11 PROCEDURE — 86140 C-REACTIVE PROTEIN: CPT | Mod: ZL | Performed by: FAMILY MEDICINE

## 2025-08-11 PROCEDURE — 85652 RBC SED RATE AUTOMATED: CPT | Mod: ZL | Performed by: FAMILY MEDICINE

## 2025-08-11 PROCEDURE — 36415 COLL VENOUS BLD VENIPUNCTURE: CPT | Mod: ZL | Performed by: FAMILY MEDICINE

## 2025-08-11 PROCEDURE — G0463 HOSPITAL OUTPT CLINIC VISIT: HCPCS

## 2025-08-11 PROCEDURE — 85018 HEMOGLOBIN: CPT | Mod: ZL | Performed by: FAMILY MEDICINE

## 2025-08-11 PROCEDURE — 80048 BASIC METABOLIC PNL TOTAL CA: CPT | Mod: ZL | Performed by: FAMILY MEDICINE

## 2025-08-11 RX ORDER — ASPIRIN 81 MG/1
81 TABLET ORAL DAILY
COMMUNITY

## 2025-08-11 ASSESSMENT — SOCIAL DETERMINANTS OF HEALTH (SDOH): HOW OFTEN DO YOU GET TOGETHER WITH FRIENDS OR RELATIVES?: TWICE A WEEK

## 2025-08-11 ASSESSMENT — PAIN SCALES - GENERAL: PAINLEVEL_OUTOF10: MILD PAIN (1)

## 2025-08-20 ENCOUNTER — TELEPHONE (OUTPATIENT)
Dept: FAMILY MEDICINE | Facility: OTHER | Age: 80
End: 2025-08-20

## 2025-08-21 ENCOUNTER — OFFICE VISIT (OUTPATIENT)
Dept: CARDIOLOGY | Facility: OTHER | Age: 80
End: 2025-08-21
Attending: NURSE PRACTITIONER
Payer: COMMERCIAL

## 2025-08-21 VITALS
SYSTOLIC BLOOD PRESSURE: 122 MMHG | WEIGHT: 231 LBS | BODY MASS INDEX: 33.07 KG/M2 | HEART RATE: 77 BPM | DIASTOLIC BLOOD PRESSURE: 77 MMHG | HEIGHT: 70 IN | RESPIRATION RATE: 16 BRPM | OXYGEN SATURATION: 94 %

## 2025-08-21 DIAGNOSIS — Z79.01 CHRONIC ANTICOAGULATION: ICD-10-CM

## 2025-08-21 DIAGNOSIS — I49.5 SICK SINUS SYNDROME (H): ICD-10-CM

## 2025-08-21 DIAGNOSIS — R60.0 BILATERAL LEG EDEMA: ICD-10-CM

## 2025-08-21 DIAGNOSIS — R42 DIZZINESS: ICD-10-CM

## 2025-08-21 DIAGNOSIS — Z95.0 PACEMAKER: ICD-10-CM

## 2025-08-21 DIAGNOSIS — I48.0 PAROXYSMAL ATRIAL FIBRILLATION (H): Primary | ICD-10-CM

## 2025-08-21 LAB
ATRIAL RATE - MUSE: 73 BPM
DIASTOLIC BLOOD PRESSURE - MUSE: NORMAL MMHG
INTERPRETATION ECG - MUSE: NORMAL
P AXIS - MUSE: 59 DEGREES
PR INTERVAL - MUSE: 178 MS
QRS DURATION - MUSE: 104 MS
QT - MUSE: 388 MS
QTC - MUSE: 427 MS
R AXIS - MUSE: 10 DEGREES
SYSTOLIC BLOOD PRESSURE - MUSE: NORMAL MMHG
T AXIS - MUSE: 41 DEGREES
VENTRICULAR RATE- MUSE: 73 BPM

## 2025-08-21 PROCEDURE — G0463 HOSPITAL OUTPT CLINIC VISIT: HCPCS

## 2025-08-21 ASSESSMENT — PAIN SCALES - GENERAL: PAINLEVEL_OUTOF10: NO PAIN (0)

## 2025-08-25 DIAGNOSIS — M10.00 IDIOPATHIC GOUT, UNSPECIFIED CHRONICITY, UNSPECIFIED SITE: ICD-10-CM

## 2025-08-25 RX ORDER — ALLOPURINOL 300 MG/1
1 TABLET ORAL DAILY
Qty: 90 TABLET | Refills: 3 | Status: SHIPPED | OUTPATIENT
Start: 2025-08-25